# Patient Record
Sex: MALE | Race: WHITE | Employment: OTHER | URBAN - METROPOLITAN AREA
[De-identification: names, ages, dates, MRNs, and addresses within clinical notes are randomized per-mention and may not be internally consistent; named-entity substitution may affect disease eponyms.]

---

## 2017-03-08 LAB — DIABETIC RETINOPATHY: NORMAL

## 2017-12-26 LAB
AVERAGE GLUCOSE: NORMAL
CHOLESTEROL, TOTAL: 220 MG/DL
CHOLESTEROL/HDL RATIO: ABNORMAL
CREATININE, URINE: 227.1
HBA1C MFR BLD: 8.9 %
HDLC SERPL-MCNC: 46 MG/DL (ref 35–70)
LDL CHOLESTEROL CALCULATED: 158 MG/DL (ref 0–160)
MICROALBUMIN/CREAT 24H UR: 19.9 MG/G{CREAT}
MICROALBUMIN/CREAT UR-RTO: 8.8
TRIGL SERPL-MCNC: 82 MG/DL
VLDLC SERPL CALC-MCNC: ABNORMAL MG/DL

## 2018-02-15 LAB
A/G RATIO: NORMAL
ALBUMIN SERPL-MCNC: 4.6 G/DL
ALP BLD-CCNC: 101 U/L
ALT SERPL-CCNC: 16 U/L
AST SERPL-CCNC: 16 U/L
BILIRUB SERPL-MCNC: 0.6 MG/DL (ref 0.1–1.4)
BILIRUBIN DIRECT: 0.49 MG/DL
BILIRUBIN, INDIRECT: NORMAL
BUN BLDV-MCNC: 15 MG/DL
CALCIUM SERPL-MCNC: 10 MG/DL
CHLORIDE BLD-SCNC: 102 MMOL/L
CO2: 28 MMOL/L
CREAT SERPL-MCNC: 1 MG/DL
GFR CALCULATED: NORMAL
GLOBULIN: NORMAL
GLUCOSE BLD-MCNC: 188 MG/DL
POTASSIUM SERPL-SCNC: 4.6 MMOL/L
PROTEIN TOTAL: 7.3 G/DL
SODIUM BLD-SCNC: 137 MMOL/L

## 2018-03-07 DIAGNOSIS — M19.90 ARTHRITIS: ICD-10-CM

## 2018-03-07 DIAGNOSIS — H91.90 HEARING LOSS, UNSPECIFIED HEARING LOSS TYPE, UNSPECIFIED LATERALITY: ICD-10-CM

## 2018-03-07 PROBLEM — R68.84 MAXILLARY PAIN: Status: ACTIVE | Noted: 2018-02-05

## 2018-03-07 PROBLEM — Z12.11 ENCOUNTER FOR SCREENING COLONOSCOPY: Status: ACTIVE | Noted: 2018-01-02

## 2018-03-08 ENCOUNTER — OFFICE VISIT (OUTPATIENT)
Dept: ENDOCRINOLOGY | Age: 68
End: 2018-03-08

## 2018-03-08 ENCOUNTER — TELEPHONE (OUTPATIENT)
Dept: ENDOCRINOLOGY | Age: 68
End: 2018-03-08

## 2018-03-08 VITALS
HEIGHT: 77 IN | HEART RATE: 92 BPM | WEIGHT: 220 LBS | OXYGEN SATURATION: 94 % | DIASTOLIC BLOOD PRESSURE: 76 MMHG | SYSTOLIC BLOOD PRESSURE: 107 MMHG | BODY MASS INDEX: 25.98 KG/M2

## 2018-03-08 DIAGNOSIS — Z12.11 ENCOUNTER FOR SCREENING COLONOSCOPY: ICD-10-CM

## 2018-03-08 DIAGNOSIS — E55.9 VITAMIN D DEFICIENCY: ICD-10-CM

## 2018-03-08 DIAGNOSIS — I82.411 ACUTE DEEP VEIN THROMBOSIS (DVT) OF FEMORAL VEIN OF RIGHT LOWER EXTREMITY (HCC): ICD-10-CM

## 2018-03-08 DIAGNOSIS — E78.2 MIXED HYPERLIPIDEMIA: ICD-10-CM

## 2018-03-08 DIAGNOSIS — E11.9 TYPE 2 DIABETES MELLITUS WITHOUT COMPLICATION, WITHOUT LONG-TERM CURRENT USE OF INSULIN (HCC): Primary | ICD-10-CM

## 2018-03-08 PROCEDURE — 99204 OFFICE O/P NEW MOD 45 MIN: CPT | Performed by: NURSE PRACTITIONER

## 2018-03-08 RX ORDER — RIVAROXABAN 15 MG/1
15 TABLET, FILM COATED ORAL
Refills: 5 | COMMUNITY
Start: 2018-02-12 | End: 2020-01-21

## 2018-03-08 RX ORDER — LANOLIN ALCOHOL/MO/W.PET/CERES
1000 CREAM (GRAM) TOPICAL DAILY
COMMUNITY
End: 2018-07-05 | Stop reason: ALTCHOICE

## 2018-03-08 RX ORDER — OMEPRAZOLE 20 MG/1
20 CAPSULE, DELAYED RELEASE ORAL DAILY
COMMUNITY

## 2018-03-08 RX ORDER — BLOOD-GLUCOSE METER
1 KIT MISCELLANEOUS DAILY PRN
COMMUNITY
End: 2020-08-26 | Stop reason: ALTCHOICE

## 2018-03-08 RX ORDER — SERTRALINE HYDROCHLORIDE 100 MG/1
100 TABLET, FILM COATED ORAL DAILY
COMMUNITY
End: 2020-08-26 | Stop reason: ALTCHOICE

## 2018-03-08 ASSESSMENT — ENCOUNTER SYMPTOMS
EYE PAIN: 0
COLOR CHANGE: 0
CONSTIPATION: 0
DIARRHEA: 0
SHORTNESS OF BREATH: 0
NAUSEA: 0

## 2018-03-08 ASSESSMENT — PATIENT HEALTH QUESTIONNAIRE - PHQ9
SUM OF ALL RESPONSES TO PHQ9 QUESTIONS 1 & 2: 0
2. FEELING DOWN, DEPRESSED OR HOPELESS: 0
1. LITTLE INTEREST OR PLEASURE IN DOING THINGS: 0
SUM OF ALL RESPONSES TO PHQ QUESTIONS 1-9: 0

## 2018-03-08 NOTE — PROGRESS NOTES
Creatinine Ratio    8.8    Microalb, Ur    19.9   Creatinine, Urine    227.10     Diabetic Health Maintenance   · Last Eye Exam: last year, next one is 2 weeks out  · Last Foot exam: never  · Has patient seen a dietitian? Yes  · Current Exercise: No structured exercise  · On ACEI or ARB: no  · On statin: no    Risk Factors  · Smoker: never smoker  · ETOH: none  · Co-morbid conditions: h/o DVT, DDD, trigeminal neuralgia    Hyperlipidemia:  LDL and Chol elevated. Not on a statin  Lab Results   Component Value Date    CHOL 220 12/26/2017     Lab Results   Component Value Date    TRIG 82 12/26/2017     Lab Results   Component Value Date    HDL 46 12/26/2017     Lab Results   Component Value Date    LDLCALC 158 12/26/2017     No results found for: LDLDIRECT  No results found for: CHOLHDLRATIO    Vitamin D deficiency: Currently is on no supplementation. Current complaints include fatigue on daily basis. Last vitamin D level is:  No results found for: VD23T, VITD3, VD25, VITD25    Hypertension  Currently not on any antihypertensive. Controlled , denies symptoms of dizziness, light headedness. Occasional dependent edema. Tries to follow a salt restricted diet.    Lab Results   Component Value Date     02/15/2018    K 4.6 02/15/2018     02/15/2018    CO2 28 02/15/2018    BUN 15 02/15/2018    CREATININE 1.00 02/15/2018    GLUCOSE 188 02/15/2018    CALCIUM 10.0 02/15/2018    PROT 7.3 02/15/2018    LABALBU 4.6 02/15/2018    BILITOT 0.6 02/15/2018    ALKPHOS 101 02/15/2018    AST 16 02/15/2018    ALT 16 02/15/2018     Past Medical History:   Diagnosis Date    Acute deep vein thrombosis (DVT) of femoral vein of right lower extremity (HCC)     Arthritis     Back pain     Chronic kidney disease     Depression (emotion)     Diabetes mellitus (HCC)     GERD (gastroesophageal reflux disease)     Hearing loss     [place] (hard of hearing)     hearing aids at home    Migraine     Trigeminal neuralgia      Family History   Problem Relation Age of Onset    Diabetes Mother     Alzheimer's Disease Mother     Cancer Father     Heart Disease Father      Review of Systems   Constitutional: Negative for activity change, appetite change, diaphoresis, fever and unexpected weight change. HENT: Negative for dental problem. Eyes: Negative for pain and visual disturbance. Respiratory: Negative for shortness of breath. Cardiovascular: Negative for chest pain, palpitations and leg swelling. Gastrointestinal: Negative for constipation, diarrhea and nausea. Endocrine: Negative for cold intolerance, heat intolerance, polydipsia, polyphagia and polyuria. Genitourinary: Negative for frequency and urgency. Musculoskeletal: Negative for arthralgias, joint swelling and myalgias. Skin: Negative for color change and pallor. Neurological: Negative for weakness, numbness and headaches. Psychiatric/Behavioral: Negative for dysphoric mood and sleep disturbance. The patient is not nervous/anxious. Vitals:    03/08/18 1101   BP: 107/76   Site: Left Arm   Position: Sitting   Cuff Size: Large Adult   Pulse: 92   SpO2: 94%   Weight: 220 lb (99.8 kg)   Height: 6' 5\" (1.956 m)     Physical Exam   Constitutional: He is oriented to person, place, and time. He appears well-developed and well-nourished. Eyes: Pupils are equal, round, and reactive to light. Neck: Normal range of motion. No thyromegaly present. Cardiovascular: Normal rate, regular rhythm and normal heart sounds. Pulmonary/Chest: Effort normal and breath sounds normal.   Abdominal: He exhibits no distension. Musculoskeletal: Normal range of motion. He exhibits no edema. Neurological: He is alert and oriented to person, place, and time. No sensory deficit. Skin: Skin is warm and dry. No skin changes or evidence of trauma. Psychiatric: He has a normal mood and affect. His behavior is normal. Thought content normal.   Vitals reviewed.     Skeletal foot if that helps him stay off a statin    XARELTO 15 MG TABS tablet    Vitamin D deficiency    Relevant Orders    VITAMIN D 25 HYDROXY        Return in about 1 month (around 4/8/2018).

## 2018-03-08 NOTE — TELEPHONE ENCOUNTER
Shlomo Castillo from [facility] needs clarity on order for Trulicity it's a pre-filled pen, need order sent over for the correct mg/dosage, her phn is [phone]

## 2018-03-12 DIAGNOSIS — E11.9 TYPE 2 DIABETES MELLITUS WITHOUT COMPLICATION, WITHOUT LONG-TERM CURRENT USE OF INSULIN (HCC): ICD-10-CM

## 2018-03-12 LAB — DIABETIC RETINOPATHY: NORMAL

## 2018-04-02 ENCOUNTER — OFFICE VISIT (OUTPATIENT)
Dept: ENDOCRINOLOGY | Age: 68
End: 2018-04-02

## 2018-04-02 ENCOUNTER — TELEPHONE (OUTPATIENT)
Dept: ENDOCRINOLOGY | Age: 68
End: 2018-04-02

## 2018-04-02 VITALS
SYSTOLIC BLOOD PRESSURE: 109 MMHG | BODY MASS INDEX: 25.27 KG/M2 | HEIGHT: 77 IN | DIASTOLIC BLOOD PRESSURE: 66 MMHG | OXYGEN SATURATION: 98 % | HEART RATE: 95 BPM | WEIGHT: 214 LBS

## 2018-04-02 DIAGNOSIS — E11.00 UNCONTROLLED TYPE 2 DIABETES MELLITUS WITH HYPEROSMOLARITY WITHOUT COMA, WITHOUT LONG-TERM CURRENT USE OF INSULIN (HCC): Primary | ICD-10-CM

## 2018-04-02 LAB — HBA1C MFR BLD: 8.8 %

## 2018-04-02 PROCEDURE — 95250 CONT GLUC MNTR PHYS/QHP EQP: CPT | Performed by: NURSE PRACTITIONER

## 2018-04-02 PROCEDURE — 95251 CONT GLUC MNTR ANALYSIS I&R: CPT | Performed by: NURSE PRACTITIONER

## 2018-04-02 PROCEDURE — 99214 OFFICE O/P EST MOD 30 MIN: CPT | Performed by: NURSE PRACTITIONER

## 2018-04-02 PROCEDURE — 83036 HEMOGLOBIN GLYCOSYLATED A1C: CPT | Performed by: NURSE PRACTITIONER

## 2018-04-02 RX ORDER — REPAGLINIDE 1 MG/1
1 TABLET ORAL 2 TIMES DAILY PRN
Qty: 90 TABLET | Refills: 3 | Status: SHIPPED | OUTPATIENT
Start: 2018-04-02 | End: 2018-10-15

## 2018-04-02 RX ORDER — FLASH GLUCOSE SENSOR
3 KIT MISCELLANEOUS
Qty: 3 EACH | Refills: 2 | Status: SHIPPED | OUTPATIENT
Start: 2018-04-02 | End: 2018-07-05 | Stop reason: ALTCHOICE

## 2018-04-02 RX ORDER — FLASH GLUCOSE SENSOR
1 KIT MISCELLANEOUS PRN
Qty: 1 DEVICE | Refills: 0 | Status: SHIPPED | OUTPATIENT
Start: 2018-04-02 | End: 2018-07-05 | Stop reason: ALTCHOICE

## 2018-04-28 DIAGNOSIS — E11.9 TYPE 2 DIABETES MELLITUS WITHOUT COMPLICATION, WITHOUT LONG-TERM CURRENT USE OF INSULIN (HCC): ICD-10-CM

## 2018-04-30 RX ORDER — BLOOD-GLUCOSE METER
KIT MISCELLANEOUS
Qty: 100 STRIP | Refills: 0 | Status: SHIPPED | OUTPATIENT
Start: 2018-04-30 | End: 2018-07-31 | Stop reason: SDUPTHER

## 2018-04-30 RX ORDER — LANCETS 28 GAUGE
EACH MISCELLANEOUS
Qty: 100 EACH | Refills: 0 | Status: SHIPPED | OUTPATIENT
Start: 2018-04-30 | End: 2018-07-31 | Stop reason: SDUPTHER

## 2018-06-26 DIAGNOSIS — E11.00 UNCONTROLLED TYPE 2 DIABETES MELLITUS WITH HYPEROSMOLARITY WITHOUT COMA, WITHOUT LONG-TERM CURRENT USE OF INSULIN (HCC): ICD-10-CM

## 2018-06-26 RX ORDER — DULAGLUTIDE 1.5 MG/.5ML
INJECTION, SOLUTION SUBCUTANEOUS
Qty: 2 ML | Refills: 0 | Status: SHIPPED | OUTPATIENT
Start: 2018-06-26 | End: 2018-07-24 | Stop reason: SDUPTHER

## 2018-07-05 ENCOUNTER — OFFICE VISIT (OUTPATIENT)
Dept: ENDOCRINOLOGY | Age: 68
End: 2018-07-05

## 2018-07-05 VITALS
HEART RATE: 84 BPM | DIASTOLIC BLOOD PRESSURE: 70 MMHG | BODY MASS INDEX: 25.03 KG/M2 | WEIGHT: 212 LBS | OXYGEN SATURATION: 97 % | HEIGHT: 77 IN | SYSTOLIC BLOOD PRESSURE: 118 MMHG

## 2018-07-05 DIAGNOSIS — E55.9 VITAMIN D DEFICIENCY: ICD-10-CM

## 2018-07-05 DIAGNOSIS — R53.82 CHRONIC FATIGUE: ICD-10-CM

## 2018-07-05 DIAGNOSIS — E11.00 UNCONTROLLED TYPE 2 DIABETES MELLITUS WITH HYPEROSMOLARITY WITHOUT COMA, WITHOUT LONG-TERM CURRENT USE OF INSULIN (HCC): ICD-10-CM

## 2018-07-05 DIAGNOSIS — E11.00 UNCONTROLLED TYPE 2 DIABETES MELLITUS WITH HYPEROSMOLARITY WITHOUT COMA, WITHOUT LONG-TERM CURRENT USE OF INSULIN (HCC): Primary | ICD-10-CM

## 2018-07-05 LAB
A/G RATIO: 2 (ref 1.1–2.2)
ALBUMIN SERPL-MCNC: 4.3 G/DL (ref 3.4–5)
ALP BLD-CCNC: 93 U/L (ref 40–129)
ALT SERPL-CCNC: 20 U/L (ref 10–40)
ANION GAP SERPL CALCULATED.3IONS-SCNC: 10 MMOL/L (ref 3–16)
AST SERPL-CCNC: 17 U/L (ref 15–37)
BILIRUB SERPL-MCNC: 0.4 MG/DL (ref 0–1)
BUN BLDV-MCNC: 16 MG/DL (ref 7–20)
CALCIUM SERPL-MCNC: 9 MG/DL (ref 8.3–10.6)
CHLORIDE BLD-SCNC: 107 MMOL/L (ref 99–110)
CO2: 25 MMOL/L (ref 21–32)
CREAT SERPL-MCNC: 0.9 MG/DL (ref 0.8–1.3)
GFR AFRICAN AMERICAN: >60
GFR NON-AFRICAN AMERICAN: >60
GLOBULIN: 2.2 G/DL
GLUCOSE BLD-MCNC: 189 MG/DL (ref 70–99)
HBA1C MFR BLD: 7.9 %
HCT VFR BLD CALC: 38.4 % (ref 40.5–52.5)
HEMOGLOBIN: 13.3 G/DL (ref 13.5–17.5)
MCH RBC QN AUTO: 31.5 PG (ref 26–34)
MCHC RBC AUTO-ENTMCNC: 34.5 G/DL (ref 31–36)
MCV RBC AUTO: 91.2 FL (ref 80–100)
PDW BLD-RTO: 15.2 % (ref 12.4–15.4)
PLATELET # BLD: 144 K/UL (ref 135–450)
PMV BLD AUTO: 8.5 FL (ref 5–10.5)
POTASSIUM SERPL-SCNC: 5.1 MMOL/L (ref 3.5–5.1)
RBC # BLD: 4.21 M/UL (ref 4.2–5.9)
SODIUM BLD-SCNC: 142 MMOL/L (ref 136–145)
TOTAL PROTEIN: 6.5 G/DL (ref 6.4–8.2)
VITAMIN D 25-HYDROXY: 38.3 NG/ML
WBC # BLD: 3.5 K/UL (ref 4–11)

## 2018-07-05 PROCEDURE — 83036 HEMOGLOBIN GLYCOSYLATED A1C: CPT | Performed by: NURSE PRACTITIONER

## 2018-07-05 PROCEDURE — 99214 OFFICE O/P EST MOD 30 MIN: CPT | Performed by: NURSE PRACTITIONER

## 2018-07-05 RX ORDER — METFORMIN HYDROCHLORIDE 750 MG/1
750 TABLET, EXTENDED RELEASE ORAL
Qty: 30 TABLET | Refills: 3 | Status: SHIPPED | OUTPATIENT
Start: 2018-07-05 | End: 2018-09-27 | Stop reason: SDUPTHER

## 2018-07-05 ASSESSMENT — ENCOUNTER SYMPTOMS
NAUSEA: 0
DIARRHEA: 0
CONSTIPATION: 0
COLOR CHANGE: 0
SHORTNESS OF BREATH: 0
EYE PAIN: 0

## 2018-07-05 ASSESSMENT — PATIENT HEALTH QUESTIONNAIRE - PHQ9
SUM OF ALL RESPONSES TO PHQ QUESTIONS 1-9: 0
2. FEELING DOWN, DEPRESSED OR HOPELESS: 0
SUM OF ALL RESPONSES TO PHQ9 QUESTIONS 1 & 2: 0
1. LITTLE INTEREST OR PLEASURE IN DOING THINGS: 0

## 2018-07-05 NOTE — PROGRESS NOTES
4/2/2018    A1C trends :  Lab Results   Component Value Date    LABA1C 8.8 04/02/2018    LABA1C 8.9 12/26/2017     No results found for: EAG    Current Medication regimen:   Janumet   BID    Other medications tried:  -  Invokana had side effects: \" makes me behave like a zombie\"  -  Glipizide 10 mg caused hypoglycemic episodes even in divided doses, does not want to re-try as he is a . 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    Complications:  · Neuropathy tingling on the right foot: numbness on dorsal aspect of right foot, tingling/pins and needles on right foot and right knee lateral aspect  · Retinopathy: temporarily lost some temporal field of vision on right eye s/p surgery for trigeminal neuralgia,   · Nephropathy: also has a h/o recurrent kidney stones  (right kidney)    Component   Microalbumin Creatinine Ratio    8.8    Microalb, Ur    19.9   Creatinine, Urine    227.10     Diabetic Health Maintenance   · Last Eye Exam: last year, next one is 2 weeks out  · Last Foot exam: never  · Has patient seen a dietitian? Yes  · Current Exercise: No structured exercise  · On ACEI or ARB: no  · On statin: no    Risk Factors  · Smoker: never smoker  · ETOH: none  · Co-morbid conditions: h/o DVT, DDD, trigeminal neuralgia    Hyperlipidemia:  LDL and Chol elevated. Not on a statin  Lab Results   Component Value Date    CHOL 220 12/26/2017     Lab Results   Component Value Date    TRIG 82 12/26/2017     Lab Results   Component Value Date    HDL 46 12/26/2017     Lab Results   Component Value Date    LDLCALC 158 12/26/2017     No results found for: LDLDIRECT  No results found for: CHOLHDLRATIO    Vitamin D deficiency: Currently is on no supplementation. Current complaints include fatigue on daily basis. Last vitamin D level is:  No results found for: VD23T, VITD3, VD25, VITD25    Hypertension  Currently not on any antihypertensive. Controlled , denies symptoms of dizziness, light headedness. Occasional dependent edema. Tries to follow a salt restricted diet. Lab Results   Component Value Date     02/15/2018    K 4.6 02/15/2018     02/15/2018    CO2 28 02/15/2018    BUN 15 02/15/2018    CREATININE 1.00 02/15/2018    GLUCOSE 188 02/15/2018    CALCIUM 10.0 02/15/2018    PROT 7.3 02/15/2018    LABALBU 4.6 02/15/2018    BILITOT 0.6 02/15/2018    ALKPHOS 101 02/15/2018    AST 16 02/15/2018    ALT 16 02/15/2018     Past Medical History:   Diagnosis Date    Acute deep vein thrombosis (DVT) of femoral vein of right lower extremity (HCC)     Arthritis     Back pain     Chronic kidney disease     Depression (emotion)     Diabetes mellitus (HCC)     GERD (gastroesophageal reflux disease)     Hearing loss     [place] (hard of hearing)     hearing aids at home    Migraine     Trigeminal neuralgia      Family History   Problem Relation Age of Onset    Diabetes Mother     Alzheimer's Disease Mother     Cancer Father     Heart Disease Father      Review of Systems   Constitutional: Negative for activity change, appetite change, diaphoresis, fever and unexpected weight change. HENT: Negative for dental problem. Eyes: Negative for pain and visual disturbance. Respiratory: Negative for shortness of breath. Cardiovascular: Negative for chest pain, palpitations and leg swelling. Gastrointestinal: Negative for constipation, diarrhea and nausea. Endocrine: Negative for cold intolerance, heat intolerance, polydipsia, polyphagia and polyuria. Genitourinary: Negative for frequency and urgency. Musculoskeletal: Negative for arthralgias, joint swelling and myalgias. Skin: Negative for color change and pallor. Neurological: Negative for weakness, numbness and headaches. Psychiatric/Behavioral: Negative for dysphoric mood and sleep disturbance. The patient is not nervous/anxious.        Vitals:    07/05/18 1122   Weight: 212 lb (96.2 kg)   Height: 6' 5\" (1.956 m)     Physical Exam Carb/insulin ratio   Fats: Good fats and bad fats, meal planning and supplements.  Physical activity:Discussed how it affects BS readings.  Different diabetic medications   Managing high and low sugar readings      Relevant Medications          metFORMIN (GLUCOPHAGE XR) 750 MG extended release tablet    Other Relevant Orders         Comprehensive Metabolic Panel    RESOLVED: Encounter for screening colonoscopy    Mixed hyperlipidemia    Declined starting a statin at this visit  Wants to wait atleast 3-6 months and improve dietary to see if that helps him stay off a statin    XARELTO 15 MG TABS tablet    Vitamin D deficiency    Relevant Orders    VITAMIN D 25 HYDROXY      Fatigue           Relevant Orders                CBC     Return in about 3 months (around 10/5/2018).

## 2018-07-24 DIAGNOSIS — E11.00 UNCONTROLLED TYPE 2 DIABETES MELLITUS WITH HYPEROSMOLARITY WITHOUT COMA, WITHOUT LONG-TERM CURRENT USE OF INSULIN (HCC): ICD-10-CM

## 2018-07-25 RX ORDER — DULAGLUTIDE 1.5 MG/.5ML
INJECTION, SOLUTION SUBCUTANEOUS
Qty: 2 ML | Refills: 0 | Status: SHIPPED | OUTPATIENT
Start: 2018-07-25 | End: 2018-08-21 | Stop reason: SDUPTHER

## 2018-07-31 DIAGNOSIS — E11.9 TYPE 2 DIABETES MELLITUS WITHOUT COMPLICATION, UNSPECIFIED LONG TERM INSULIN USE STATUS: Primary | ICD-10-CM

## 2018-07-31 DIAGNOSIS — E11.9 TYPE 2 DIABETES MELLITUS WITHOUT COMPLICATION, WITHOUT LONG-TERM CURRENT USE OF INSULIN (HCC): ICD-10-CM

## 2018-07-31 RX ORDER — BLOOD-GLUCOSE METER
KIT MISCELLANEOUS
Qty: 100 STRIP | Refills: 0 | Status: SHIPPED | OUTPATIENT
Start: 2018-07-31 | End: 2018-10-27 | Stop reason: SDUPTHER

## 2018-08-01 RX ORDER — LANCETS 28 GAUGE
EACH MISCELLANEOUS
Qty: 100 EACH | Refills: 0 | Status: SHIPPED | OUTPATIENT
Start: 2018-08-01 | End: 2018-11-07 | Stop reason: SDUPTHER

## 2018-08-21 DIAGNOSIS — E11.00 UNCONTROLLED TYPE 2 DIABETES MELLITUS WITH HYPEROSMOLARITY WITHOUT COMA, WITHOUT LONG-TERM CURRENT USE OF INSULIN (HCC): ICD-10-CM

## 2018-08-22 RX ORDER — DULAGLUTIDE 1.5 MG/.5ML
INJECTION, SOLUTION SUBCUTANEOUS
Qty: 2 ML | Refills: 0 | Status: SHIPPED | OUTPATIENT
Start: 2018-08-22 | End: 2018-09-15 | Stop reason: SDUPTHER

## 2018-09-06 ENCOUNTER — TELEPHONE (OUTPATIENT)
Dept: ENDOCRINOLOGY | Age: 68
End: 2018-09-06

## 2018-09-07 ENCOUNTER — OFFICE VISIT (OUTPATIENT)
Dept: ENDOCRINOLOGY | Age: 68
End: 2018-09-07

## 2018-09-07 VITALS
DIASTOLIC BLOOD PRESSURE: 81 MMHG | BODY MASS INDEX: 24.54 KG/M2 | SYSTOLIC BLOOD PRESSURE: 116 MMHG | HEART RATE: 90 BPM | HEIGHT: 77 IN | OXYGEN SATURATION: 96 % | WEIGHT: 207.8 LBS

## 2018-09-07 DIAGNOSIS — E11.9 CONTROLLED TYPE 2 DIABETES MELLITUS WITHOUT COMPLICATION, WITH LONG-TERM CURRENT USE OF INSULIN (HCC): Primary | ICD-10-CM

## 2018-09-07 DIAGNOSIS — Z79.4 CONTROLLED TYPE 2 DIABETES MELLITUS WITHOUT COMPLICATION, WITH LONG-TERM CURRENT USE OF INSULIN (HCC): Primary | ICD-10-CM

## 2018-09-07 LAB — HBA1C MFR BLD: 8.3 %

## 2018-09-07 PROCEDURE — 83036 HEMOGLOBIN GLYCOSYLATED A1C: CPT | Performed by: NURSE PRACTITIONER

## 2018-09-07 PROCEDURE — 99214 OFFICE O/P EST MOD 30 MIN: CPT | Performed by: NURSE PRACTITIONER

## 2018-09-07 PROCEDURE — G0444 DEPRESSION SCREEN ANNUAL: HCPCS | Performed by: NURSE PRACTITIONER

## 2018-09-07 ASSESSMENT — PATIENT HEALTH QUESTIONNAIRE - PHQ9
3. TROUBLE FALLING OR STAYING ASLEEP: 2
6. FEELING BAD ABOUT YOURSELF - OR THAT YOU ARE A FAILURE OR HAVE LET YOURSELF OR YOUR FAMILY DOWN: 0
SUM OF ALL RESPONSES TO PHQ QUESTIONS 1-9: 9
SUM OF ALL RESPONSES TO PHQ9 QUESTIONS 1 & 2: 4
4. FEELING TIRED OR HAVING LITTLE ENERGY: 2
9. THOUGHTS THAT YOU WOULD BE BETTER OFF DEAD, OR OF HURTING YOURSELF: 0
1. LITTLE INTEREST OR PLEASURE IN DOING THINGS: 1
10. IF YOU CHECKED OFF ANY PROBLEMS, HOW DIFFICULT HAVE THESE PROBLEMS MADE IT FOR YOU TO DO YOUR WORK, TAKE CARE OF THINGS AT HOME, OR GET ALONG WITH OTHER PEOPLE: 2
8. MOVING OR SPEAKING SO SLOWLY THAT OTHER PEOPLE COULD HAVE NOTICED. OR THE OPPOSITE, BEING SO FIGETY OR RESTLESS THAT YOU HAVE BEEN MOVING AROUND A LOT MORE THAN USUAL: 0
7. TROUBLE CONCENTRATING ON THINGS, SUCH AS READING THE NEWSPAPER OR WATCHING TELEVISION: 0
5. POOR APPETITE OR OVEREATING: 1
2. FEELING DOWN, DEPRESSED OR HOPELESS: 3
SUM OF ALL RESPONSES TO PHQ QUESTIONS 1-9: 9

## 2018-09-07 ASSESSMENT — ENCOUNTER SYMPTOMS
COLOR CHANGE: 0
DIARRHEA: 0
NAUSEA: 0
SHORTNESS OF BREATH: 0
EYE PAIN: 0
CONSTIPATION: 0

## 2018-09-07 NOTE — PROGRESS NOTES
Endocrinology  [place], [place]  Phone: [phone]   FAX: [phone]    Parveen Tran is a 80 y.o. male and following up for evaluation of  Diabetes Mellitus type 2 since   Last A1C:   Lab Results   Component Value Date    LABA1C 8.3 2018    LABA1C 7.9 2018    LABA1C 8.8 2018     Last BP Readings:   BP Readings from Last 3 Encounters:   18 116/81   18 118/70   18 109/66     Last LDL:   Lab Results   Component Value Date    LDLCALC 158 2017     Aspirin Use: No    Tobacco History:    Smoking status: Never Smoker    Smokeless tobacco: Never Used     Diabetes:  Parveen Tran  has been diabetic for almost 7 years and managed since then on oral hypoglycemics. He reports that diabetes is generally fairly well controlled. Disease course has been worsening and current complications include peripheral neuropathy and microalbuminuria. He reports that his new job as a  is driving his poor dietary choices as he eats fat foot/high carb snacks etc more frequently  Patient reports good compliance with medication about > 90% of the time, dietary challenges as above, and almost no structured exercise due to back surgery. Recent surgery for trigeminal neuralgia ( second ) about 2 weeks ago. Interim History on 2018  Patient reports BG in high 300s. Back pain and significant stress with wife having  suddenly with seizures,moving his residence and back surgery coming up. Diet is fairly low carb except occasionally. Interim history on 2018  Patient brought glucometer for download: Yes  Readings per day 1.1  Average reading 184  Lowest in past 2 weeks 142  Highest in past 2 weeks 377  Hypoglycemia awareness and symptoms: relative hypoglycemia in 90s    Recent accidents: hit in the face by his horse ( face is bruised), and then fell face down in stairwell, nose required 7 stitches in ED.  Will follow with PCP    On 3/8/2018  Patient brought glucometer for download: not to visit, will bring it on Monday  Readings per day fasting numbers  Average reading high 100s  Lowest in past 2 weeks 70s  Highest in past 2 weeks 200s  Hypoglycemia awareness and symptoms: in the 40s  about 2 months    Sarah Pro CGM done on 4/2/2018    A1C trends :  Lab Results   Component Value Date    LABA1C 8.3 09/07/2018    LABA1C 7.9 07/05/2018    LABA1C 8.8 04/02/2018     No results found for: EAG    Current Medication regimen:   Janumet   BID    Other medications tried:  -  Invokana had side effects: \" makes me behave like a zombie\"  -  Glipizide 10 mg caused hypoglycemic episodes even in divided doses, does not want to re-try as he is a . 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    Complications:  · Neuropathy tingling on the right foot: numbness on dorsal aspect of right foot, tingling/pins and needles on right foot and right knee lateral aspect  · Retinopathy: temporarily lost some temporal field of vision on right eye s/p surgery for trigeminal neuralgia,   · Nephropathy: also has a h/o recurrent kidney stones  (right kidney)    Component   Microalbumin Creatinine Ratio    8.8    Microalb, Ur    19.9   Creatinine, Urine    227.10     Diabetic Health Maintenance   · Last Eye Exam: last year, next one is 2 weeks out  · Last Foot exam: never  · Has patient seen a dietitian? Yes  · Current Exercise: No structured exercise  · On ACEI or ARB: no  · On statin: no    Risk Factors  · Smoker: never smoker  · ETOH: none  · Co-morbid conditions: h/o DVT, DDD, trigeminal neuralgia    Hyperlipidemia:  LDL and Chol elevated.  Not on a statin  Lab Results   Component Value Date    CHOL 220 12/26/2017     Lab Results   Component Value Date    TRIG 82 12/26/2017     Lab Results   Component Value Date    HDL 46 12/26/2017     Lab Results   Component Value Date    LDLCALC 158 12/26/2017     No results found for: LDLDIRECT  No results found for: CHOLHDLRATIO    Vitamin D Musculoskeletal: Negative for arthralgias, joint swelling and myalgias. Skin: Negative for color change and pallor. Neurological: Negative for weakness, numbness and headaches. Psychiatric/Behavioral: Negative for dysphoric mood and sleep disturbance. The patient is not nervous/anxious. Vitals:    09/07/18 1110   BP: 116/81   Site: Left Upper Arm   Position: Sitting   Cuff Size: Large Adult   Pulse: 90   SpO2: 96%   Weight: 207 lb 12.8 oz (94.3 kg)   Height: 6' 5\" (1.956 m)     Physical Exam   Constitutional: He is oriented to person, place, and time. He appears well-developed and well-nourished. Eyes: Pupils are equal, round, and reactive to light. Neck: Normal range of motion. No thyromegaly present. Cardiovascular: Normal rate, regular rhythm and normal heart sounds. Pulmonary/Chest: Effort normal and breath sounds normal.   Abdominal: He exhibits no distension. Musculoskeletal: Normal range of motion. He exhibits no edema. Neurological: He is alert and oriented to person, place, and time. No sensory deficit. Skin: Skin is warm and dry. No skin changes or evidence of trauma. Psychiatric: He has a normal mood and affect. His behavior is normal. Thought content normal.   Vitals reviewed. Skeletal foot exam: Declined at visit    Matt Weaver is a 80years old male with a h/o Diabetes mellitus type 2 complicated with neuropathy and microalbuminuria and  associated with trigeminal neuralgia, h/o DVT, and degenerative disc disease.     Plan  Problem List Items Addressed This Visit     DM (diabetes mellitus), type 2 ([facility] [address] - Primary     A1c = 8.3 (increased)   Goal reduction at next visit < 7     Started Lantus @ 10 units with titration by 1 unit every 3 days until AM fasting is 120-130     Did not administer the last 2 doses of trulicity correctly         Switched from Janumet to Trulicity and Metformin     GI s/e with metformin--> switch to Metformin XR at this visit, he

## 2018-09-15 DIAGNOSIS — E11.00 UNCONTROLLED TYPE 2 DIABETES MELLITUS WITH HYPEROSMOLARITY WITHOUT COMA, WITHOUT LONG-TERM CURRENT USE OF INSULIN (HCC): ICD-10-CM

## 2018-09-23 RX ORDER — DULAGLUTIDE 1.5 MG/.5ML
INJECTION, SOLUTION SUBCUTANEOUS
Qty: 2 ML | Refills: 0 | Status: SHIPPED | OUTPATIENT
Start: 2018-09-23 | End: 2018-10-16 | Stop reason: SDUPTHER

## 2018-09-27 DIAGNOSIS — E11.00 UNCONTROLLED TYPE 2 DIABETES MELLITUS WITH HYPEROSMOLARITY WITHOUT COMA, WITHOUT LONG-TERM CURRENT USE OF INSULIN (HCC): ICD-10-CM

## 2018-09-28 RX ORDER — METFORMIN HYDROCHLORIDE 750 MG/1
TABLET, EXTENDED RELEASE ORAL
Qty: 30 TABLET | Refills: 0 | Status: SHIPPED | OUTPATIENT
Start: 2018-09-28 | End: 2018-10-25 | Stop reason: SDUPTHER

## 2018-10-15 ENCOUNTER — OFFICE VISIT (OUTPATIENT)
Dept: ENDOCRINOLOGY | Age: 68
End: 2018-10-15
Payer: MEDICARE

## 2018-10-15 VITALS
WEIGHT: 209.2 LBS | BODY MASS INDEX: 24.7 KG/M2 | DIASTOLIC BLOOD PRESSURE: 74 MMHG | HEART RATE: 72 BPM | SYSTOLIC BLOOD PRESSURE: 117 MMHG | HEIGHT: 77 IN | OXYGEN SATURATION: 97 %

## 2018-10-15 DIAGNOSIS — E55.9 VITAMIN D DEFICIENCY: ICD-10-CM

## 2018-10-15 DIAGNOSIS — E11.9 CONTROLLED TYPE 2 DIABETES MELLITUS WITHOUT COMPLICATION, WITH LONG-TERM CURRENT USE OF INSULIN (HCC): Primary | ICD-10-CM

## 2018-10-15 DIAGNOSIS — R53.82 CHRONIC FATIGUE: ICD-10-CM

## 2018-10-15 DIAGNOSIS — Z79.4 CONTROLLED TYPE 2 DIABETES MELLITUS WITHOUT COMPLICATION, WITH LONG-TERM CURRENT USE OF INSULIN (HCC): Primary | ICD-10-CM

## 2018-10-15 DIAGNOSIS — E78.2 MIXED HYPERLIPIDEMIA: ICD-10-CM

## 2018-10-15 PROCEDURE — 99214 OFFICE O/P EST MOD 30 MIN: CPT | Performed by: NURSE PRACTITIONER

## 2018-10-15 RX ORDER — GLIMEPIRIDE 4 MG/1
4 TABLET ORAL
Qty: 30 TABLET | Refills: 3 | Status: SHIPPED | OUTPATIENT
Start: 2018-10-15 | End: 2019-05-15 | Stop reason: SDUPTHER

## 2018-10-15 ASSESSMENT — ENCOUNTER SYMPTOMS
COLOR CHANGE: 0
DIARRHEA: 0
NAUSEA: 0
EYE PAIN: 0
SHORTNESS OF BREATH: 0
CONSTIPATION: 0

## 2018-10-15 NOTE — PROGRESS NOTES
CHOL 220 12/26/2017     Lab Results   Component Value Date    TRIG 82 12/26/2017     Lab Results   Component Value Date    HDL 46 12/26/2017     Lab Results   Component Value Date    LDLCALC 158 12/26/2017     No results found for: LDLDIRECT  No results found for: CHOLHDLRATIO    Vitamin D deficiency: Currently is on no supplementation. Current complaints include fatigue on daily basis. Last vitamin D level is:  Lab Results   Component Value Date    VITD25 38.3 07/05/2018     Hypertension  Currently not on any antihypertensive. Controlled , denies symptoms of dizziness, light headedness. Occasional dependent edema. Tries to follow a salt restricted diet. Lab Results   Component Value Date     07/05/2018    K 5.1 07/05/2018     07/05/2018    CO2 25 07/05/2018    BUN 16 07/05/2018    CREATININE 0.9 07/05/2018    GLUCOSE 189 (H) 07/05/2018    CALCIUM 9.0 07/05/2018    PROT 6.5 07/05/2018    LABALBU 4.3 07/05/2018    BILITOT 0.4 07/05/2018    ALKPHOS 93 07/05/2018    AST 17 07/05/2018    ALT 20 07/05/2018    LABGLOM >60 07/05/2018    GFRAA >60 07/05/2018    AGRATIO 2.0 07/05/2018    GLOB 2.2 07/05/2018     Past Medical History:   Diagnosis Date    Acute deep vein thrombosis (DVT) of femoral vein of right lower extremity (HCC)     Arthritis     Back pain     Chronic kidney disease     Depression (emotion)     Diabetes mellitus (HCC)     GERD (gastroesophageal reflux disease)     Hearing loss     [place] (hard of hearing)     hearing aids at home    Migraine     Trigeminal neuralgia      Family History   Problem Relation Age of Onset    Diabetes Mother     Alzheimer's Disease Mother     Cancer Father     Heart Disease Father      Review of Systems   Constitutional: Negative for activity change, appetite change, diaphoresis, fever and unexpected weight change. HENT: Negative for dental problem. Eyes: Negative for pain and visual disturbance. Respiratory: Negative for shortness of breath. Cardiovascular: Negative for chest pain, palpitations and leg swelling. Gastrointestinal: Negative for constipation, diarrhea and nausea. Endocrine: Negative for cold intolerance, heat intolerance, polydipsia, polyphagia and polyuria. Genitourinary: Negative for frequency and urgency. Musculoskeletal: Negative for arthralgias, joint swelling and myalgias. Skin: Negative for color change and pallor. Neurological: Negative for weakness, numbness and headaches. Psychiatric/Behavioral: Negative for dysphoric mood and sleep disturbance. The patient is not nervous/anxious. Vitals:    10/15/18 1257   BP: 117/74   Site: Left Upper Arm   Position: Sitting   Cuff Size: Large Adult   Pulse: 72   SpO2: 97%   Weight: 209 lb 3.2 oz (94.9 kg)   Height: 6' 5\" (1.956 m)     Physical Exam   Constitutional: He is oriented to person, place, and time. He appears well-developed and well-nourished. Eyes: Pupils are equal, round, and reactive to light. Neck: Normal range of motion. No thyromegaly present. Cardiovascular: Normal rate, regular rhythm and normal heart sounds. Pulmonary/Chest: Effort normal and breath sounds normal.   Abdominal: He exhibits no distension. Musculoskeletal: Normal range of motion. He exhibits no edema. Neurological: He is alert and oriented to person, place, and time. No sensory deficit. Skin: Skin is warm and dry. No skin changes or evidence of trauma. Psychiatric: He has a normal mood and affect. His behavior is normal. Thought content normal.   Vitals reviewed. 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     Plan  Problem List Items Addressed This Visit     DM (diabetes mellitus), type 2 ([facility] [address] - Primary     A1c = 8.3 (increased)   Goal reduction at next

## 2018-10-16 DIAGNOSIS — E11.00 UNCONTROLLED TYPE 2 DIABETES MELLITUS WITH HYPEROSMOLARITY WITHOUT COMA, WITHOUT LONG-TERM CURRENT USE OF INSULIN (HCC): ICD-10-CM

## 2018-10-17 RX ORDER — DULAGLUTIDE 1.5 MG/.5ML
INJECTION, SOLUTION SUBCUTANEOUS
Qty: 2 ML | Refills: 0 | Status: SHIPPED | OUTPATIENT
Start: 2018-10-17 | End: 2018-11-07 | Stop reason: SDUPTHER

## 2018-10-25 DIAGNOSIS — E11.00 UNCONTROLLED TYPE 2 DIABETES MELLITUS WITH HYPEROSMOLARITY WITHOUT COMA, WITHOUT LONG-TERM CURRENT USE OF INSULIN (HCC): ICD-10-CM

## 2018-10-25 RX ORDER — METFORMIN HYDROCHLORIDE 750 MG/1
TABLET, EXTENDED RELEASE ORAL
Qty: 30 TABLET | Refills: 0 | Status: SHIPPED | OUTPATIENT
Start: 2018-10-25 | End: 2018-11-29 | Stop reason: SDUPTHER

## 2018-10-27 DIAGNOSIS — E11.9 TYPE 2 DIABETES MELLITUS WITHOUT COMPLICATION, WITHOUT LONG-TERM CURRENT USE OF INSULIN (HCC): ICD-10-CM

## 2018-10-30 RX ORDER — BLOOD-GLUCOSE METER
KIT MISCELLANEOUS
Qty: 100 STRIP | Refills: 0 | Status: SHIPPED | OUTPATIENT
Start: 2018-10-30 | End: 2019-02-18 | Stop reason: SDUPTHER

## 2018-11-07 DIAGNOSIS — E11.00 UNCONTROLLED TYPE 2 DIABETES MELLITUS WITH HYPEROSMOLARITY WITHOUT COMA, WITHOUT LONG-TERM CURRENT USE OF INSULIN (HCC): ICD-10-CM

## 2018-11-07 DIAGNOSIS — E11.9 TYPE 2 DIABETES MELLITUS WITHOUT COMPLICATION (HCC): ICD-10-CM

## 2018-11-07 RX ORDER — DULAGLUTIDE 1.5 MG/.5ML
INJECTION, SOLUTION SUBCUTANEOUS
Qty: 2 ML | Refills: 0 | Status: SHIPPED | OUTPATIENT
Start: 2018-11-07 | End: 2018-12-05 | Stop reason: SDUPTHER

## 2018-11-07 RX ORDER — LANCETS 28 GAUGE
EACH MISCELLANEOUS
Qty: 100 EACH | Refills: 0 | Status: SHIPPED | OUTPATIENT
Start: 2018-11-07

## 2018-11-29 DIAGNOSIS — E11.00 UNCONTROLLED TYPE 2 DIABETES MELLITUS WITH HYPEROSMOLARITY WITHOUT COMA, WITHOUT LONG-TERM CURRENT USE OF INSULIN (HCC): ICD-10-CM

## 2018-11-29 RX ORDER — METFORMIN HYDROCHLORIDE 750 MG/1
TABLET, EXTENDED RELEASE ORAL
Qty: 30 TABLET | Refills: 0 | Status: SHIPPED | OUTPATIENT
Start: 2018-11-29 | End: 2018-12-29 | Stop reason: SDUPTHER

## 2018-12-05 DIAGNOSIS — E11.00 UNCONTROLLED TYPE 2 DIABETES MELLITUS WITH HYPEROSMOLARITY WITHOUT COMA, WITHOUT LONG-TERM CURRENT USE OF INSULIN (HCC): ICD-10-CM

## 2018-12-05 RX ORDER — DULAGLUTIDE 1.5 MG/.5ML
INJECTION, SOLUTION SUBCUTANEOUS
Qty: 2 ML | Refills: 0 | Status: SHIPPED | OUTPATIENT
Start: 2018-12-05 | End: 2019-01-08 | Stop reason: SDUPTHER

## 2018-12-29 DIAGNOSIS — E11.00 UNCONTROLLED TYPE 2 DIABETES MELLITUS WITH HYPEROSMOLARITY WITHOUT COMA, WITHOUT LONG-TERM CURRENT USE OF INSULIN (HCC): ICD-10-CM

## 2018-12-31 RX ORDER — METFORMIN HYDROCHLORIDE 750 MG/1
TABLET, EXTENDED RELEASE ORAL
Qty: 30 TABLET | Refills: 0 | Status: SHIPPED | OUTPATIENT
Start: 2018-12-31 | End: 2019-02-28 | Stop reason: SDUPTHER

## 2019-01-08 DIAGNOSIS — E11.00 UNCONTROLLED TYPE 2 DIABETES MELLITUS WITH HYPEROSMOLARITY WITHOUT COMA, WITHOUT LONG-TERM CURRENT USE OF INSULIN (HCC): ICD-10-CM

## 2019-01-13 RX ORDER — DULAGLUTIDE 1.5 MG/.5ML
INJECTION, SOLUTION SUBCUTANEOUS
Qty: 2 ML | Refills: 0 | Status: SHIPPED | OUTPATIENT
Start: 2019-01-13 | End: 2019-03-06 | Stop reason: SDUPTHER

## 2019-02-18 ENCOUNTER — OFFICE VISIT (OUTPATIENT)
Dept: ENDOCRINOLOGY | Age: 69
End: 2019-02-18
Payer: MEDICARE

## 2019-02-18 VITALS
SYSTOLIC BLOOD PRESSURE: 107 MMHG | WEIGHT: 221.2 LBS | HEIGHT: 77 IN | OXYGEN SATURATION: 97 % | BODY MASS INDEX: 26.12 KG/M2 | HEART RATE: 83 BPM | DIASTOLIC BLOOD PRESSURE: 66 MMHG

## 2019-02-18 DIAGNOSIS — E11.9 TYPE 2 DIABETES MELLITUS WITHOUT COMPLICATION, WITHOUT LONG-TERM CURRENT USE OF INSULIN (HCC): ICD-10-CM

## 2019-02-18 DIAGNOSIS — E11.9 CONTROLLED TYPE 2 DIABETES MELLITUS WITHOUT COMPLICATION, WITH LONG-TERM CURRENT USE OF INSULIN (HCC): Primary | ICD-10-CM

## 2019-02-18 DIAGNOSIS — E55.9 VITAMIN D DEFICIENCY: ICD-10-CM

## 2019-02-18 DIAGNOSIS — Z79.4 CONTROLLED TYPE 2 DIABETES MELLITUS WITHOUT COMPLICATION, WITH LONG-TERM CURRENT USE OF INSULIN (HCC): Primary | ICD-10-CM

## 2019-02-18 LAB — HBA1C MFR BLD: 8.3 %

## 2019-02-18 PROCEDURE — 99214 OFFICE O/P EST MOD 30 MIN: CPT | Performed by: NURSE PRACTITIONER

## 2019-02-18 PROCEDURE — 83036 HEMOGLOBIN GLYCOSYLATED A1C: CPT | Performed by: NURSE PRACTITIONER

## 2019-02-18 RX ORDER — CHLORHEXIDINE GLUCONATE 0.12 MG/ML
RINSE ORAL
Refills: 0 | COMMUNITY
Start: 2019-02-13 | End: 2020-08-26 | Stop reason: ALTCHOICE

## 2019-02-18 RX ORDER — GLIMEPIRIDE 2 MG/1
2 TABLET ORAL EVERY MORNING
Qty: 30 TABLET | Refills: 3 | Status: SHIPPED | OUTPATIENT
Start: 2019-02-18 | End: 2019-08-26 | Stop reason: SINTOL

## 2019-02-18 RX ORDER — GABAPENTIN 300 MG/1
CAPSULE ORAL
Refills: 0 | COMMUNITY
Start: 2018-12-27 | End: 2019-02-18

## 2019-02-18 RX ORDER — RIVAROXABAN 20 MG/1
TABLET, FILM COATED ORAL
Refills: 2 | COMMUNITY
Start: 2018-11-29

## 2019-02-18 ASSESSMENT — ENCOUNTER SYMPTOMS
DIARRHEA: 0
EYE PAIN: 0
NAUSEA: 0
SHORTNESS OF BREATH: 0
CONSTIPATION: 0
COLOR CHANGE: 0

## 2019-02-28 DIAGNOSIS — E11.00 UNCONTROLLED TYPE 2 DIABETES MELLITUS WITH HYPEROSMOLARITY WITHOUT COMA, WITHOUT LONG-TERM CURRENT USE OF INSULIN (HCC): ICD-10-CM

## 2019-02-28 DIAGNOSIS — E11.9 TYPE 2 DIABETES MELLITUS WITHOUT COMPLICATION, WITHOUT LONG-TERM CURRENT USE OF INSULIN (HCC): ICD-10-CM

## 2019-03-01 RX ORDER — BLOOD-GLUCOSE METER
KIT MISCELLANEOUS
Qty: 100 STRIP | Refills: 5 | Status: SHIPPED | OUTPATIENT
Start: 2019-03-01

## 2019-03-01 RX ORDER — METFORMIN HYDROCHLORIDE 750 MG/1
TABLET, EXTENDED RELEASE ORAL
Qty: 30 TABLET | Refills: 3 | Status: SHIPPED | OUTPATIENT
Start: 2019-03-01 | End: 2019-05-31 | Stop reason: SDUPTHER

## 2019-03-05 DIAGNOSIS — E11.00 UNCONTROLLED TYPE 2 DIABETES MELLITUS WITH HYPEROSMOLARITY WITHOUT COMA, WITHOUT LONG-TERM CURRENT USE OF INSULIN (HCC): ICD-10-CM

## 2019-03-15 ENCOUNTER — HOSPITAL ENCOUNTER (OUTPATIENT)
Age: 69
Discharge: HOME | End: 2019-03-15
Payer: MEDICARE

## 2019-03-15 DIAGNOSIS — E11.9 CONTROLLED TYPE 2 DIABETES MELLITUS WITHOUT COMPLICATION, WITH LONG-TERM CURRENT USE OF INSULIN (HCC): ICD-10-CM

## 2019-03-15 DIAGNOSIS — Z79.4 CONTROLLED TYPE 2 DIABETES MELLITUS WITHOUT COMPLICATION, WITH LONG-TERM CURRENT USE OF INSULIN (HCC): ICD-10-CM

## 2019-03-15 DIAGNOSIS — E55.9 VITAMIN D DEFICIENCY: ICD-10-CM

## 2019-03-15 LAB
A/G RATIO: 1.4 (ref 1.1–2.2)
ALBUMIN SERPL-MCNC: 4.3 G/DL (ref 3.4–5)
ALP BLD-CCNC: 94 U/L (ref 40–129)
ALT SERPL-CCNC: 18 U/L (ref 10–40)
ANION GAP SERPL CALCULATED.3IONS-SCNC: 10 MMOL/L (ref 3–16)
AST SERPL-CCNC: 19 U/L (ref 15–37)
BILIRUB SERPL-MCNC: 0.5 MG/DL (ref 0–1)
BUN BLDV-MCNC: 16 MG/DL (ref 7–20)
CALCIUM SERPL-MCNC: 9.6 MG/DL (ref 8.3–10.6)
CHLORIDE BLD-SCNC: 105 MMOL/L (ref 99–110)
CHOLESTEROL, TOTAL: 189 MG/DL (ref 0–199)
CO2: 25 MMOL/L (ref 21–32)
CREAT SERPL-MCNC: 1 MG/DL (ref 0.8–1.3)
CREATININE URINE: 146.1 MG/DL (ref 39–259)
GFR AFRICAN AMERICAN: >60
GFR NON-AFRICAN AMERICAN: >60
GLOBULIN: 3 G/DL
GLUCOSE BLD-MCNC: 180 MG/DL (ref 70–99)
HDLC SERPL-MCNC: 53 MG/DL (ref 40–60)
LDL CHOLESTEROL CALCULATED: 119 MG/DL
MICROALBUMIN UR-MCNC: <1.2 MG/DL
MICROALBUMIN/CREAT UR-RTO: NORMAL MG/G (ref 0–30)
POTASSIUM SERPL-SCNC: 4.6 MMOL/L (ref 3.5–5.1)
SODIUM BLD-SCNC: 140 MMOL/L (ref 136–145)
TOTAL PROTEIN: 7.3 G/DL (ref 6.4–8.2)
TRIGL SERPL-MCNC: 85 MG/DL (ref 0–150)
TSH REFLEX FT4: 1.15 UIU/ML (ref 0.27–4.2)
VITAMIN D 25-HYDROXY: 29.3 NG/ML
VLDLC SERPL CALC-MCNC: 17 MG/DL

## 2019-03-15 PROCEDURE — 36415 COLL VENOUS BLD VENIPUNCTURE: CPT

## 2019-03-15 PROCEDURE — 82570 ASSAY OF URINE CREATININE: CPT

## 2019-03-15 PROCEDURE — 82306 VITAMIN D 25 HYDROXY: CPT

## 2019-03-15 PROCEDURE — 84443 ASSAY THYROID STIM HORMONE: CPT

## 2019-03-15 PROCEDURE — 80061 LIPID PANEL: CPT

## 2019-03-15 PROCEDURE — 80053 COMPREHEN METABOLIC PANEL: CPT

## 2019-03-15 PROCEDURE — 83036 HEMOGLOBIN GLYCOSYLATED A1C: CPT

## 2019-03-15 PROCEDURE — 82043 UR ALBUMIN QUANTITATIVE: CPT

## 2019-03-16 LAB
ESTIMATED AVERAGE GLUCOSE: 188.6 MG/DL
HBA1C MFR BLD: 8.2 %

## 2019-05-13 ENCOUNTER — TELEPHONE (OUTPATIENT)
Dept: ENDOCRINOLOGY | Age: 69
End: 2019-05-13

## 2019-05-13 ENCOUNTER — OFFICE VISIT (OUTPATIENT)
Dept: ENDOCRINOLOGY | Age: 69
End: 2019-05-13
Payer: MEDICARE

## 2019-05-13 VITALS
HEART RATE: 81 BPM | BODY MASS INDEX: 25.2 KG/M2 | WEIGHT: 213.4 LBS | DIASTOLIC BLOOD PRESSURE: 81 MMHG | HEIGHT: 77 IN | OXYGEN SATURATION: 96 % | SYSTOLIC BLOOD PRESSURE: 135 MMHG

## 2019-05-13 DIAGNOSIS — E11.00 UNCONTROLLED TYPE 2 DIABETES MELLITUS WITH HYPEROSMOLARITY WITHOUT COMA, WITHOUT LONG-TERM CURRENT USE OF INSULIN (HCC): ICD-10-CM

## 2019-05-13 DIAGNOSIS — E11.00 UNCONTROLLED TYPE 2 DIABETES MELLITUS WITH HYPEROSMOLARITY WITHOUT COMA, WITHOUT LONG-TERM CURRENT USE OF INSULIN (HCC): Primary | ICD-10-CM

## 2019-05-13 PROCEDURE — 99213 OFFICE O/P EST LOW 20 MIN: CPT | Performed by: NURSE PRACTITIONER

## 2019-05-13 ASSESSMENT — ENCOUNTER SYMPTOMS
COLOR CHANGE: 0
DIARRHEA: 0
SHORTNESS OF BREATH: 0
EYE PAIN: 0
NAUSEA: 0
CONSTIPATION: 0

## 2019-05-13 NOTE — PATIENT INSTRUCTIONS
Restart Lantus at 6 units at night    Calcium: may use 1 regular Tums daily; maintain milk intake    Vitamin D 1000 Units daily
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
## 2019-05-13 NOTE — TELEPHONE ENCOUNTER
Pharmacy called states script sent over for 2 Pre Filled Syringes of Trulicity, but it only comes in a box of 4 that they are not able to open and half out. Please let then know what you want to do.  Thomas 104 [phone]

## 2019-05-15 DIAGNOSIS — E11.9 CONTROLLED TYPE 2 DIABETES MELLITUS WITHOUT COMPLICATION, WITH LONG-TERM CURRENT USE OF INSULIN (HCC): ICD-10-CM

## 2019-05-15 DIAGNOSIS — Z79.4 CONTROLLED TYPE 2 DIABETES MELLITUS WITHOUT COMPLICATION, WITH LONG-TERM CURRENT USE OF INSULIN (HCC): ICD-10-CM

## 2019-05-16 RX ORDER — GLIMEPIRIDE 4 MG/1
TABLET ORAL
Qty: 30 TABLET | Refills: 0 | Status: SHIPPED | OUTPATIENT
Start: 2019-05-16 | End: 2019-08-26

## 2019-05-31 DIAGNOSIS — E11.00 UNCONTROLLED TYPE 2 DIABETES MELLITUS WITH HYPEROSMOLARITY WITHOUT COMA, WITHOUT LONG-TERM CURRENT USE OF INSULIN (HCC): ICD-10-CM

## 2019-05-31 RX ORDER — METFORMIN HYDROCHLORIDE 750 MG/1
TABLET, EXTENDED RELEASE ORAL
Qty: 90 TABLET | Refills: 3 | Status: SHIPPED | OUTPATIENT
Start: 2019-05-31 | End: 2020-04-20

## 2019-06-07 ENCOUNTER — TELEPHONE (OUTPATIENT)
Dept: ENDOCRINOLOGY | Age: 69
End: 2019-06-07

## 2019-06-07 NOTE — TELEPHONE ENCOUNTER
[facility] called and said they need a clarification on the lantus solostar pen.    Patient ref # is [id number]  Please call

## 2019-06-11 NOTE — TELEPHONE ENCOUNTER
Spoke with [facility] and was advised that this has already been shipped and she doesn't know why they called.

## 2019-06-14 ENCOUNTER — HOSPITAL ENCOUNTER (OUTPATIENT)
Age: 69
Discharge: HOME | End: 2019-06-14
Payer: MEDICARE

## 2019-06-14 DIAGNOSIS — E11.00 UNCONTROLLED TYPE 2 DIABETES MELLITUS WITH HYPEROSMOLARITY WITHOUT COMA, WITHOUT LONG-TERM CURRENT USE OF INSULIN (HCC): ICD-10-CM

## 2019-06-14 PROCEDURE — 36415 COLL VENOUS BLD VENIPUNCTURE: CPT

## 2019-06-14 PROCEDURE — 83036 HEMOGLOBIN GLYCOSYLATED A1C: CPT

## 2019-06-15 LAB
ESTIMATED AVERAGE GLUCOSE: 162.8 MG/DL
HBA1C MFR BLD: 7.3 %

## 2019-06-17 ENCOUNTER — TELEPHONE (OUTPATIENT)
Dept: ENDOCRINOLOGY | Age: 69
End: 2019-06-17

## 2019-06-17 NOTE — TELEPHONE ENCOUNTER
Spoke to patient and gave lab results      Reviewed results, Please update patient that A1c has improved to 7. 3!

## 2019-08-26 ENCOUNTER — OFFICE VISIT (OUTPATIENT)
Dept: ENDOCRINOLOGY | Age: 69
End: 2019-08-26
Payer: MEDICARE

## 2019-08-26 VITALS
WEIGHT: 215.4 LBS | SYSTOLIC BLOOD PRESSURE: 126 MMHG | DIASTOLIC BLOOD PRESSURE: 66 MMHG | HEIGHT: 77 IN | OXYGEN SATURATION: 98 % | HEART RATE: 80 BPM | BODY MASS INDEX: 25.43 KG/M2

## 2019-08-26 DIAGNOSIS — E11.00 UNCONTROLLED TYPE 2 DIABETES MELLITUS WITH HYPEROSMOLARITY WITHOUT COMA, WITHOUT LONG-TERM CURRENT USE OF INSULIN (HCC): ICD-10-CM

## 2019-08-26 PROCEDURE — 99213 OFFICE O/P EST LOW 20 MIN: CPT | Performed by: NURSE PRACTITIONER

## 2019-08-26 ASSESSMENT — ENCOUNTER SYMPTOMS
SHORTNESS OF BREATH: 0
COLOR CHANGE: 0
DIARRHEA: 0
CONSTIPATION: 0
EYE PAIN: 0
NAUSEA: 0

## 2019-08-26 NOTE — PROGRESS NOTES
January as was out of town. Also has had recent back surgery and is recovering well from it. Brought glucometer for download: Yes  Readings per day 1  Average reading high 167  Lowest in past 2 weeks 136  Highest in past 2 weeks 215  Hypoglycemia awareness and symptoms: none recent    Interim History on 10/15/2018  Had back surgery on 2018. Brought glucometer for download: Yes  Readings per day 1  Average reading high 150s  Lowest in past 2 weeks 140  Highest in past 2 weeks 233  Hypoglycemia awareness and symptoms: relative hypoglycemia in 90s, but no recent episode     Interim History on 2018  Patient reports BG in high 300s. Back pain and significant stress with wife having  suddenly with seizures,moving his residence and back surgery coming up. Diet is fairly low carb except occasionally. Interim history on 2018  Patient brought glucometer for download: Yes  Readings per day 1.1  Average reading 184  Lowest in past 2 weeks 142  Highest in past 2 weeks 377  Hypoglycemia awareness and symptoms: relative hypoglycemia in 90s    Recent accidents: hit in the face by his horse ( face is bruised), and then fell face down in stairwell, nose required 7 stitches in ED.  Will follow with PCP    On 3/8/2018  Patient brought glucometer for download: not to visit, will bring it on Monday  Readings per day fasting numbers  Average reading high 100s  Lowest in past 2 weeks 70s  Highest in past 2 weeks 200s  Hypoglycemia awareness and symptoms: in the 40s  about 2 months    Sarah Pro CGM done on 2018    A1C trends :  Lab Results   Component Value Date    LABA1C 7.3 2019    LABA1C 8.2 03/15/2019    LABA1C 8.3 2019     Lab Results   Component Value Date    .8 2019    .6 03/15/2019       Current Medication regimen:   Metformin 455 mg QD  Trulicity  1.5 mg  Lantus 10 units QHS    Other medications tried:  -  Invokana had side effects: \" makes me behave like a zombie\"  -

## 2019-09-26 ENCOUNTER — HOSPITAL ENCOUNTER (OUTPATIENT)
Age: 69
Discharge: HOME | End: 2019-09-26
Payer: MEDICARE

## 2019-09-26 DIAGNOSIS — E11.00 UNCONTROLLED TYPE 2 DIABETES MELLITUS WITH HYPEROSMOLARITY WITHOUT COMA, WITHOUT LONG-TERM CURRENT USE OF INSULIN (HCC): ICD-10-CM

## 2019-09-26 PROCEDURE — 36415 COLL VENOUS BLD VENIPUNCTURE: CPT

## 2019-09-26 PROCEDURE — 83036 HEMOGLOBIN GLYCOSYLATED A1C: CPT

## 2019-09-27 LAB
ESTIMATED AVERAGE GLUCOSE: 148.5 MG/DL
HBA1C MFR BLD: 6.8 %

## 2020-01-21 ENCOUNTER — OFFICE VISIT (OUTPATIENT)
Dept: ENDOCRINOLOGY | Age: 70
End: 2020-01-21
Payer: MEDICARE

## 2020-01-21 VITALS
RESPIRATION RATE: 18 BRPM | BODY MASS INDEX: 26.12 KG/M2 | HEIGHT: 77 IN | OXYGEN SATURATION: 95 % | DIASTOLIC BLOOD PRESSURE: 86 MMHG | SYSTOLIC BLOOD PRESSURE: 122 MMHG | WEIGHT: 221.2 LBS | HEART RATE: 83 BPM

## 2020-01-21 DIAGNOSIS — E55.9 VITAMIN D DEFICIENCY: ICD-10-CM

## 2020-01-21 DIAGNOSIS — E11.00 UNCONTROLLED TYPE 2 DIABETES MELLITUS WITH HYPEROSMOLARITY WITHOUT COMA, WITHOUT LONG-TERM CURRENT USE OF INSULIN (HCC): ICD-10-CM

## 2020-01-21 LAB
A/G RATIO: 1.8 (ref 1.1–2.2)
ALBUMIN SERPL-MCNC: 4.5 G/DL (ref 3.4–5)
ALP BLD-CCNC: 100 U/L (ref 40–129)
ALT SERPL-CCNC: 14 U/L (ref 10–40)
ANION GAP SERPL CALCULATED.3IONS-SCNC: 14 MMOL/L (ref 3–16)
AST SERPL-CCNC: 16 U/L (ref 15–37)
BILIRUB SERPL-MCNC: <0.2 MG/DL (ref 0–1)
BUN BLDV-MCNC: 17 MG/DL (ref 7–20)
CALCIUM SERPL-MCNC: 9.3 MG/DL (ref 8.3–10.6)
CHLORIDE BLD-SCNC: 102 MMOL/L (ref 99–110)
CHOLESTEROL, TOTAL: 199 MG/DL (ref 0–199)
CO2: 24 MMOL/L (ref 21–32)
CREAT SERPL-MCNC: 0.8 MG/DL (ref 0.8–1.3)
CREATININE URINE: 153.9 MG/DL (ref 39–259)
GFR AFRICAN AMERICAN: >60
GFR NON-AFRICAN AMERICAN: >60
GLOBULIN: 2.5 G/DL
GLUCOSE BLD-MCNC: 162 MG/DL (ref 70–99)
HBA1C MFR BLD: 8.6 %
HDLC SERPL-MCNC: 55 MG/DL (ref 40–60)
LDL CHOLESTEROL CALCULATED: 123 MG/DL
MICROALBUMIN UR-MCNC: <1.2 MG/DL
MICROALBUMIN/CREAT UR-RTO: NORMAL MG/G (ref 0–30)
POTASSIUM SERPL-SCNC: 4.4 MMOL/L (ref 3.5–5.1)
SODIUM BLD-SCNC: 140 MMOL/L (ref 136–145)
TOTAL PROTEIN: 7 G/DL (ref 6.4–8.2)
TRIGL SERPL-MCNC: 107 MG/DL (ref 0–150)
TSH REFLEX FT4: 1.5 UIU/ML (ref 0.27–4.2)
VITAMIN D 25-HYDROXY: 26.4 NG/ML
VLDLC SERPL CALC-MCNC: 21 MG/DL

## 2020-01-21 PROCEDURE — 83036 HEMOGLOBIN GLYCOSYLATED A1C: CPT | Performed by: NURSE PRACTITIONER

## 2020-01-21 PROCEDURE — 99213 OFFICE O/P EST LOW 20 MIN: CPT | Performed by: NURSE PRACTITIONER

## 2020-01-21 RX ORDER — OXCARBAZEPINE 300 MG/1
TABLET, FILM COATED ORAL
COMMUNITY
Start: 2019-12-30 | End: 2020-04-23 | Stop reason: ALTCHOICE

## 2020-01-21 RX ORDER — BACLOFEN 10 MG/1
TABLET ORAL
COMMUNITY
Start: 2019-12-30 | End: 2020-04-23

## 2020-01-21 ASSESSMENT — ENCOUNTER SYMPTOMS
EYE PAIN: 0
DIARRHEA: 0
SHORTNESS OF BREATH: 0
NAUSEA: 0
CONSTIPATION: 0
COLOR CHANGE: 0
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
## 2020-01-22 LAB
ESTIMATED AVERAGE GLUCOSE: 188.6 MG/DL
HBA1C MFR BLD: 8.2 %

## 2020-01-26 NOTE — ASSESSMENT & PLAN NOTE
A1c today was 8.6  Goal is < 7%    Change in insulin  While taking lantus: 8 units twice a day  May increase night time dose if morning sugar is consistently > 150  Any day, the morning blood sugar less than 90 decrease the night time dose by 1- 2 units    STOP LANTUS if and when starting TOUJEO! Toujeo : start at 12-14 units night time single dose only  Adjust dose to reach  in the morning     No change in other meds.

## 2020-03-25 PROBLEM — E55.9 VITAMIN D DEFICIENCY: Status: RESOLVED | Noted: 2018-03-08 | Resolved: 2020-03-24

## 2020-04-20 RX ORDER — METFORMIN HYDROCHLORIDE 750 MG/1
TABLET, EXTENDED RELEASE ORAL
Qty: 90 TABLET | Refills: 3 | Status: SHIPPED | OUTPATIENT
Start: 2020-04-20 | End: 2020-12-02 | Stop reason: SDUPTHER

## 2020-04-23 ENCOUNTER — OFFICE VISIT (OUTPATIENT)
Dept: ENDOCRINOLOGY | Age: 70
End: 2020-04-23
Payer: MEDICARE

## 2020-04-23 PROCEDURE — 99214 OFFICE O/P EST MOD 30 MIN: CPT | Performed by: NURSE PRACTITIONER

## 2020-04-23 ASSESSMENT — ENCOUNTER SYMPTOMS
CONSTIPATION: 0
DIARRHEA: 0
NAUSEA: 0
COLOR CHANGE: 0
EYE PAIN: 0
SHORTNESS OF BREATH: 0

## 2020-04-24 ENCOUNTER — HOSPITAL ENCOUNTER (OUTPATIENT)
Age: 70
Discharge: HOME | End: 2020-04-24
Payer: MEDICARE

## 2020-04-24 PROCEDURE — 83036 HEMOGLOBIN GLYCOSYLATED A1C: CPT

## 2020-04-24 PROCEDURE — 36415 COLL VENOUS BLD VENIPUNCTURE: CPT

## 2020-04-25 LAB
ESTIMATED AVERAGE GLUCOSE: 154.2 MG/DL
HBA1C MFR BLD: 7 %

## 2020-06-15 ENCOUNTER — TELEPHONE (OUTPATIENT)
Dept: ENDOCRINOLOGY | Age: 70
End: 2020-06-15

## 2020-06-18 NOTE — TELEPHONE ENCOUNTER
Spoke with patient and read Adeola's note to him, patient understands and will call with any other questions.

## 2020-08-26 ENCOUNTER — OFFICE VISIT (OUTPATIENT)
Dept: ENDOCRINOLOGY | Age: 70
End: 2020-08-26
Payer: MEDICARE

## 2020-08-26 VITALS — BODY MASS INDEX: 26.57 KG/M2 | HEIGHT: 77 IN | WEIGHT: 225 LBS

## 2020-08-26 LAB — HBA1C MFR BLD: 6.9 %

## 2020-08-26 PROCEDURE — 99214 OFFICE O/P EST MOD 30 MIN: CPT | Performed by: NURSE PRACTITIONER

## 2020-08-26 PROCEDURE — 83036 HEMOGLOBIN GLYCOSYLATED A1C: CPT | Performed by: NURSE PRACTITIONER

## 2020-08-26 RX ORDER — PEN NEEDLE, DIABETIC 31 GX5/16"
1 NEEDLE, DISPOSABLE MISCELLANEOUS DAILY
Qty: 100 EACH | Refills: 6 | Status: SHIPPED | OUTPATIENT
Start: 2020-08-26

## 2020-08-26 ASSESSMENT — ENCOUNTER SYMPTOMS
NAUSEA: 0
CONSTIPATION: 0
COLOR CHANGE: 0
EYE PAIN: 0
DIARRHEA: 0
SHORTNESS OF BREATH: 0
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
## 2020-08-27 RX ORDER — INSULIN GLARGINE 300 U/ML
INJECTION, SOLUTION SUBCUTANEOUS
Qty: 4.5 ML | Refills: 4 | Status: SHIPPED | OUTPATIENT
Start: 2020-08-27 | End: 2021-11-07

## 2020-12-02 ENCOUNTER — OFFICE VISIT (OUTPATIENT)
Dept: ENDOCRINOLOGY | Age: 70
End: 2020-12-02
Payer: MEDICARE

## 2020-12-02 ENCOUNTER — HOSPITAL ENCOUNTER (OUTPATIENT)
Age: 70
Discharge: HOME | End: 2020-12-02
Payer: MEDICARE

## 2020-12-02 VITALS
WEIGHT: 214 LBS | HEART RATE: 82 BPM | HEIGHT: 77 IN | DIASTOLIC BLOOD PRESSURE: 76 MMHG | SYSTOLIC BLOOD PRESSURE: 112 MMHG | OXYGEN SATURATION: 97 % | BODY MASS INDEX: 25.27 KG/M2 | RESPIRATION RATE: 14 BRPM

## 2020-12-02 LAB
A/G RATIO: 1.7 (ref 1.1–2.2)
ALBUMIN SERPL-MCNC: 4.3 G/DL (ref 3.4–5)
ALP BLD-CCNC: 110 U/L (ref 40–129)
ALT SERPL-CCNC: 18 U/L (ref 10–40)
ANION GAP SERPL CALCULATED.3IONS-SCNC: 9 MMOL/L (ref 3–16)
AST SERPL-CCNC: 20 U/L (ref 15–37)
BILIRUB SERPL-MCNC: 0.4 MG/DL (ref 0–1)
BUN BLDV-MCNC: 14 MG/DL (ref 7–20)
CALCIUM SERPL-MCNC: 9 MG/DL (ref 8.3–10.6)
CHLORIDE BLD-SCNC: 103 MMOL/L (ref 99–110)
CHOLESTEROL, TOTAL: 181 MG/DL (ref 0–199)
CO2: 28 MMOL/L (ref 21–32)
CREAT SERPL-MCNC: 0.8 MG/DL (ref 0.8–1.3)
GFR AFRICAN AMERICAN: >60
GFR NON-AFRICAN AMERICAN: >60
GLOBULIN: 2.6 G/DL
GLUCOSE BLD-MCNC: 61 MG/DL (ref 70–99)
HBA1C MFR BLD: 6.5 %
HCT VFR BLD CALC: 39.3 % (ref 40.5–52.5)
HDLC SERPL-MCNC: 52 MG/DL (ref 40–60)
HEMOGLOBIN: 13.1 G/DL (ref 13.5–17.5)
LDL CHOLESTEROL CALCULATED: 117 MG/DL
MCH RBC QN AUTO: 29.3 PG (ref 26–34)
MCHC RBC AUTO-ENTMCNC: 33.5 G/DL (ref 31–36)
MCV RBC AUTO: 87.6 FL (ref 80–100)
PDW BLD-RTO: 15.1 % (ref 12.4–15.4)
PLATELET # BLD: 175 K/UL (ref 135–450)
PMV BLD AUTO: 8.3 FL (ref 5–10.5)
POTASSIUM SERPL-SCNC: 4.1 MMOL/L (ref 3.5–5.1)
RBC # BLD: 4.48 M/UL (ref 4.2–5.9)
SODIUM BLD-SCNC: 140 MMOL/L (ref 136–145)
TOTAL PROTEIN: 6.9 G/DL (ref 6.4–8.2)
TRIGL SERPL-MCNC: 61 MG/DL (ref 0–150)
TSH REFLEX FT4: 1.26 UIU/ML (ref 0.27–4.2)
VITAMIN D 25-HYDROXY: 45.8 NG/ML
VLDLC SERPL CALC-MCNC: 12 MG/DL
WBC # BLD: 3.5 K/UL (ref 4–11)

## 2020-12-02 PROCEDURE — 83036 HEMOGLOBIN GLYCOSYLATED A1C: CPT | Performed by: NURSE PRACTITIONER

## 2020-12-02 PROCEDURE — 82306 VITAMIN D 25 HYDROXY: CPT

## 2020-12-02 PROCEDURE — 80053 COMPREHEN METABOLIC PANEL: CPT

## 2020-12-02 PROCEDURE — 80061 LIPID PANEL: CPT

## 2020-12-02 PROCEDURE — 36415 COLL VENOUS BLD VENIPUNCTURE: CPT

## 2020-12-02 PROCEDURE — 99214 OFFICE O/P EST MOD 30 MIN: CPT | Performed by: NURSE PRACTITIONER

## 2020-12-02 PROCEDURE — 84443 ASSAY THYROID STIM HORMONE: CPT

## 2020-12-02 PROCEDURE — 85027 COMPLETE CBC AUTOMATED: CPT

## 2020-12-02 RX ORDER — METFORMIN HYDROCHLORIDE 750 MG/1
TABLET, EXTENDED RELEASE ORAL
Qty: 90 TABLET | Refills: 3 | Status: SHIPPED | OUTPATIENT
Start: 2020-12-02 | End: 2020-12-12 | Stop reason: SDUPTHER

## 2020-12-02 ASSESSMENT — ENCOUNTER SYMPTOMS
COLOR CHANGE: 0
NAUSEA: 0
EYE PAIN: 0
DIARRHEA: 0
SHORTNESS OF BREATH: 0
CONSTIPATION: 0
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
## 2020-12-11 ENCOUNTER — TELEPHONE (OUTPATIENT)
Dept: ENDOCRINOLOGY | Age: 70
End: 2020-12-11

## 2020-12-11 NOTE — TELEPHONE ENCOUNTER
Patient called and said please send a 1 month supply of his metformin to the local pharmacy. . [facility] a [facility] [address] [place] [address] [address] [facility]

## 2020-12-12 RX ORDER — METFORMIN HYDROCHLORIDE 750 MG/1
TABLET, EXTENDED RELEASE ORAL
Qty: 30 TABLET | Refills: 0 | Status: SHIPPED | OUTPATIENT
Start: 2020-12-12 | End: 2021-11-07

## 2021-11-04 DIAGNOSIS — E11.00 UNCONTROLLED TYPE 2 DIABETES MELLITUS WITH HYPEROSMOLARITY WITHOUT COMA, WITHOUT LONG-TERM CURRENT USE OF INSULIN (HCC): ICD-10-CM

## 2021-11-05 NOTE — TELEPHONE ENCOUNTER
Medication:   Requested Prescriptions     Pending Prescriptions Disp Refills    metFORMIN (GLUCOPHAGE-XR) 750 MG extended release tablet [Pharmacy Med Name: METFORMIN HCL ER TABS 750MG] 90 tablet 3     Sig: TAKE 1 TABLET EVERY MORNING BEFORE BREAKFAST    [address] [place] 300 UNIT/ML SOPN [Pharmacy Med Name: [address] [place] PEN 1.5ML 3'S 300U/ML] 4.5 mL 4     Sig: INJECT 20 UNITS UNDER THE SKIN NIGHTLY       Last Filled:      Patient Phone Number: [phone] (home)     Last appt: 12/2/2020   Next appt:11/18/2021  Last Labs DM:   Lab Results   Component Value Date    LABA1C 6.5 12/02/2020

## 2021-11-07 RX ORDER — METFORMIN HYDROCHLORIDE 750 MG/1
TABLET, EXTENDED RELEASE ORAL
Qty: 90 TABLET | Refills: 3 | Status: SHIPPED | OUTPATIENT
Start: 2021-11-07

## 2021-11-07 RX ORDER — INSULIN GLARGINE 300 U/ML
INJECTION, SOLUTION SUBCUTANEOUS
Qty: 4.5 ML | Refills: 4 | Status: SHIPPED | OUTPATIENT
Start: 2021-11-07

## 2021-11-16 DIAGNOSIS — E78.2 MIXED HYPERLIPIDEMIA: ICD-10-CM

## 2021-11-16 DIAGNOSIS — E55.9 VITAMIN D DEFICIENCY: ICD-10-CM

## 2021-11-16 DIAGNOSIS — E11.00 UNCONTROLLED TYPE 2 DIABETES MELLITUS WITH HYPEROSMOLARITY WITHOUT COMA, WITHOUT LONG-TERM CURRENT USE OF INSULIN (HCC): Primary | ICD-10-CM

## 2021-11-16 DIAGNOSIS — R53.82 CHRONIC FATIGUE: ICD-10-CM

## 2021-11-29 ENCOUNTER — TELEPHONE (OUTPATIENT)
Dept: ENDOCRINOLOGY | Age: 71
End: 2021-11-29

## 2021-11-29 DIAGNOSIS — E11.00 UNCONTROLLED TYPE 2 DIABETES MELLITUS WITH HYPEROSMOLARITY WITHOUT COMA, WITHOUT LONG-TERM CURRENT USE OF INSULIN (HCC): Primary | ICD-10-CM

## 2021-11-29 NOTE — TELEPHONE ENCOUNTER
PT has moved to [place]. His New Endo Provider requires a Referral to be sent to them, to see him. He requests that it be faxed to office and a copy mailed to his home address.   [facility] MARK Britt  [address] [facility] [address]  Fax to [phone]

## 2021-11-29 NOTE — TELEPHONE ENCOUNTER
Referral in chart  Please fax a copy to number provided below and also mail a copy to his home address, thanks

## 2021-12-08 ENCOUNTER — OFFICE VISIT (OUTPATIENT)
Dept: ENDOCRINOLOGY | Facility: CLINIC | Age: 71
End: 2021-12-08

## 2021-12-08 DIAGNOSIS — E11.65 UNCONTROLLED TYPE 2 DIABETES MELLITUS WITH HYPERGLYCEMIA (HCC): Primary | ICD-10-CM

## 2021-12-08 DIAGNOSIS — E11.65 UNCONTROLLED TYPE 2 DIABETES MELLITUS WITH HYPERGLYCEMIA (HCC): ICD-10-CM

## 2021-12-08 PROCEDURE — G0108 DIAB MANAGE TRN  PER INDIV: HCPCS | Performed by: DIETITIAN, REGISTERED

## 2021-12-08 NOTE — PROGRESS NOTES
Spent 45 mins with pt for individual initial assessment. Pt has recently moved to the area and is needing to establish care with a new endo. Pt has a Accu-Chek meter at home. Pt was able to use demo meter to obtain a FBG of 240. He stated this am he also tested and it was 180. Pt was able to use demo kwikpen and pillow to successfully show how he gives his injections of insulin and Trulicity. Med list scanned into phone note. Pt stated that he is getting a new insurance plan in 2022, and is unsure if he will still be able to continue his current DM regimen depending on his copays on his rx's. Pt also reported that he used to have frequent low BGs he believes d/t going too long without eating, but has not had them occurring as frequently now. Pt has attended [employer] in the past, but thinks he could use a refresher. He is still settling into his new home and will be on vacation starting today, so he plans to call when he is ready to schedule his refresher classes.

## 2021-12-20 RX ORDER — NITROGLYCERIN 0.4 MG/1
0.4 TABLET SUBLINGUAL
COMMUNITY

## 2021-12-20 RX ORDER — LISINOPRIL 5 MG/1
5 TABLET ORAL DAILY
COMMUNITY
End: 2022-05-02 | Stop reason: SDUPTHER

## 2021-12-20 RX ORDER — METFORMIN HYDROCHLORIDE 750 MG/1
1 TABLET, EXTENDED RELEASE ORAL
COMMUNITY
Start: 2021-11-07 | End: 2022-06-08 | Stop reason: SDUPTHER

## 2022-01-03 ENCOUNTER — OFFICE VISIT (OUTPATIENT)
Dept: CARDIOLOGY | Facility: CLINIC | Age: 72
End: 2022-01-03

## 2022-01-03 VITALS
OXYGEN SATURATION: 98 % | DIASTOLIC BLOOD PRESSURE: 61 MMHG | HEART RATE: 71 BPM | BODY MASS INDEX: 24.09 KG/M2 | HEIGHT: 77 IN | WEIGHT: 204 LBS | SYSTOLIC BLOOD PRESSURE: 99 MMHG

## 2022-01-03 DIAGNOSIS — E78.00 PURE HYPERCHOLESTEROLEMIA: ICD-10-CM

## 2022-01-03 DIAGNOSIS — I25.10 CORONARY ARTERY DISEASE INVOLVING NATIVE CORONARY ARTERY OF NATIVE HEART WITHOUT ANGINA PECTORIS: Primary | ICD-10-CM

## 2022-01-03 DIAGNOSIS — E11.9 TYPE 2 DIABETES MELLITUS WITHOUT COMPLICATION, WITHOUT LONG-TERM CURRENT USE OF INSULIN: ICD-10-CM

## 2022-01-03 DIAGNOSIS — I10 PRIMARY HYPERTENSION: ICD-10-CM

## 2022-01-03 PROCEDURE — 93000 ELECTROCARDIOGRAM COMPLETE: CPT | Performed by: INTERNAL MEDICINE

## 2022-01-03 PROCEDURE — 99204 OFFICE O/P NEW MOD 45 MIN: CPT | Performed by: INTERNAL MEDICINE

## 2022-01-03 RX ORDER — PANTOPRAZOLE SODIUM 40 MG/1
40 TABLET, DELAYED RELEASE ORAL DAILY
COMMUNITY
End: 2022-06-08

## 2022-01-03 RX ORDER — ATORVASTATIN CALCIUM 80 MG/1
80 TABLET, FILM COATED ORAL DAILY
COMMUNITY

## 2022-01-03 NOTE — PROGRESS NOTES
"    Subjective:     Encounter Date:01/03/2022      Patient ID: Gurinder Amezcua is a 71 y.o. male.    Chief Complaint:  History of Present Illness 71-year-old white male with history of coronary status post stent placement to the circumflex artery following an MI history of hypertension hyperlipidemia diabetes presents to my office for follow-up.  Patient is currently stable without any symptoms of chest pain or shortness of breath at rest on exertion.  No complains any PND orthopnea.  No palpitation dizziness syncope or swelling of the feet.  Patient has been taking all the medicines regularly.  Patient does not smoke.  Patient is doing exercise regularly follows a good diet.    The following portions of the patient's history were reviewed and updated as appropriate: allergies, current medications, past family history, past medical history, past social history, past surgical history and problem list.  History reviewed. No pertinent past medical history.  Past Surgical History:   Procedure Laterality Date   • CARDIAC CATHETERIZATION  06/17/2021   • CORONARY STENT PLACEMENT  06/17/2021   • CRANIOTOMY     • ENDOSCOPY     • HERNIA REPAIR     • KNEE ACL RECONSTRUCTION     • KNEE ARTHROSCOPY     • LAMINECTOMY       BP 99/61 (BP Location: Left arm, Patient Position: Sitting)   Pulse 71   Ht 195.6 cm (77\")   Wt 92.5 kg (204 lb)   SpO2 98%   BMI 24.19 kg/m²   History reviewed. No pertinent family history.    Current Outpatient Medications:   •  atorvastatin (LIPITOR) 80 MG tablet, Take 80 mg by mouth Daily., Disp: , Rfl:   •  Ergocalciferol (VITAMIN D2 PO), Take  by mouth., Disp: , Rfl:   •  lisinopril (PRINIVIL,ZESTRIL) 5 MG tablet, Take 5 mg by mouth Daily., Disp: , Rfl:   •  metFORMIN ER (GLUCOPHAGE-XR) 750 MG 24 hr tablet, Take 1 tablet by mouth Every Morning Before Breakfast., Disp: , Rfl:   •  metoprolol tartrate (LOPRESSOR) 25 MG tablet, Take 25 mg by mouth., Disp: , Rfl:   •  nitroglycerin (NITROSTAT) 0.4 MG SL " tablet, Place 0.4 mg under the tongue., Disp: , Rfl:   •  pantoprazole (PROTONIX) 40 MG EC tablet, Take 40 mg by mouth Daily., Disp: , Rfl:   •  rivaroxaban (Xarelto) 20 MG tablet, Take 1 tablet by mouth Daily With Dinner., Disp: , Rfl:   •  sertraline (ZOLOFT) 50 MG tablet, Take 50 mg by mouth Daily., Disp: , Rfl:   •  ticagrelor (BRILINTA) 90 MG tablet tablet, Take 90 mg by mouth., Disp: , Rfl:   Allergies   Allergen Reactions   • Adhesive Tape Itching     tegraderm patches causes itching   • Methocarbamol Itching and Rash     Arms etc. No hives.      • Pregabalin Confusion and Other (See Comments)     Hypnotic driving episode  Dizziness and inability to focus  Dizziness and inability to focus  Other reaction(s): Confusion  lyrica     • Silver Rash     Social History     Socioeconomic History   • Marital status:    Tobacco Use   • Smoking status: Never Smoker   • Smokeless tobacco: Never Used     Review of Systems   Constitutional: Negative for fever and malaise/fatigue.   HENT: Negative for ear pain and nosebleeds.    Eyes: Negative for blurred vision and double vision.   Cardiovascular: Negative for chest pain, dyspnea on exertion and palpitations.   Respiratory: Negative for cough and shortness of breath.    Skin: Negative for rash.   Musculoskeletal: Negative for joint pain.   Gastrointestinal: Negative for abdominal pain, nausea and vomiting.   Neurological: Negative for dizziness, focal weakness, headaches and light-headedness.   Psychiatric/Behavioral: Negative for depression. The patient is not nervous/anxious.    All other systems reviewed and are negative.             Objective:     Constitutional:       Appearance: Well-developed.   Eyes:      General: No scleral icterus.     Conjunctiva/sclera: Conjunctivae normal.      Pupils: Pupils are equal, round, and reactive to light.   HENT:      Head: Normocephalic and atraumatic.   Neck:      Vascular: No carotid bruit or JVD.   Pulmonary:      Effort:  Pulmonary effort is normal.      Breath sounds: Normal breath sounds. No wheezing. No rales.   Cardiovascular:      Normal rate. Regular rhythm.   Pulses:     Intact distal pulses.   Abdominal:      General: Bowel sounds are normal.      Palpations: Abdomen is soft.   Musculoskeletal: Normal range of motion.      Cervical back: Normal range of motion and neck supple. Skin:     General: Skin is warm and dry.      Findings: No rash.   Neurological:      Mental Status: Alert.      Comments: No focal deficits         ECG 12 Lead    Date/Time: 1/3/2022 1:16 PM  Performed by: kAin Brown MD  Authorized by: Akin Brown MD   Comments: Sinus rhythm  Normal EKG  No previous EKGs available            Lab Review:         MDM  1.  Coronary disease  Patient had an MI and followed by stent placement in circumflex artery and is currently stable on medical therapy with the Brilinta metoprolol and statins  Patient is on Xarelto for DVT and hence is not on aspirin  2.  History of DVT  Patient is on Xarelto and followed by the primary care doctor  3.  Hypertension  Patient blood pressure currently stable on lisinopril metoprolol  4.  Hyperlipidemia  Patient is currently on statins and the lipid levels are followed by the primary care doctor  5.  Diabetes  Patient is on oral medicines and followed by the primary care doctor.      Patient's previous medical records, labs, and EKG were reviewed and discussed with the patient at today's visit.

## 2022-01-06 ENCOUNTER — PATIENT ROUNDING (BHMG ONLY) (OUTPATIENT)
Dept: CARDIOLOGY | Facility: CLINIC | Age: 72
End: 2022-01-06

## 2022-01-06 NOTE — PROGRESS NOTES
January 6, 2022    Hello, may I speak with Gurinder Amezcua?    My name is BRITTNI.      I am  with [facility] - [place]  [address] [facility] [facility] [place] [address].    Before we get started may I verify your date of birth? 1950    I am calling to officially welcome you to our practice and ask about your recent visit. Is this a good time to talk? No, LEFT MESSAGE ON VOICEMAIL

## 2022-01-09 ENCOUNTER — HOSPITAL ENCOUNTER (EMERGENCY)
Facility: HOSPITAL | Age: 72
Discharge: HOME | End: 2022-01-09
Admitting: EMERGENCY MEDICINE

## 2022-01-09 ENCOUNTER — APPOINTMENT (OUTPATIENT)
Dept: GENERAL RADIOLOGY | Facility: HOSPITAL | Age: 72
End: 2022-01-09

## 2022-01-09 VITALS
BODY MASS INDEX: 24.09 KG/M2 | WEIGHT: 204 LBS | DIASTOLIC BLOOD PRESSURE: 62 MMHG | TEMPERATURE: 97.7 F | RESPIRATION RATE: 16 BRPM | HEIGHT: 77 IN | HEART RATE: 79 BPM | OXYGEN SATURATION: 96 % | SYSTOLIC BLOOD PRESSURE: 101 MMHG

## 2022-01-09 DIAGNOSIS — S91.209A AVULSION OF TOENAIL, INITIAL ENCOUNTER: Primary | ICD-10-CM

## 2022-01-09 DIAGNOSIS — M79.675 PAIN OF TOE OF LEFT FOOT: ICD-10-CM

## 2022-01-09 PROCEDURE — 63710000001 ONDANSETRON ODT 4 MG TABLET DISPERSIBLE: Performed by: NURSE PRACTITIONER

## 2022-01-09 PROCEDURE — 73630 X-RAY EXAM OF FOOT: CPT

## 2022-01-09 PROCEDURE — 99283 EMERGENCY DEPT VISIT LOW MDM: CPT

## 2022-01-09 PROCEDURE — 90471 IMMUNIZATION ADMIN: CPT | Performed by: NURSE PRACTITIONER

## 2022-01-09 PROCEDURE — 25010000002 TETANUS-DIPHTH-ACELL PERTUSSIS 5-2.5-18.5 LF-MCG/0.5 SUSPENSION PREFILLED SYRINGE: Performed by: NURSE PRACTITIONER

## 2022-01-09 PROCEDURE — 90715 TDAP VACCINE 7 YRS/> IM: CPT | Performed by: NURSE PRACTITIONER

## 2022-01-09 RX ORDER — CEPHALEXIN 500 MG/1
500 CAPSULE ORAL 4 TIMES DAILY
Qty: 28 CAPSULE | Refills: 0 | Status: SHIPPED | OUTPATIENT
Start: 2022-01-09 | End: 2022-06-02

## 2022-01-09 RX ORDER — ONDANSETRON 4 MG/1
4 TABLET, ORALLY DISINTEGRATING ORAL ONCE
Status: COMPLETED | OUTPATIENT
Start: 2022-01-09 | End: 2022-01-09

## 2022-01-09 RX ADMIN — TETANUS TOXOID, REDUCED DIPHTHERIA TOXOID AND ACELLULAR PERTUSSIS VACCINE, ADSORBED 0.5 ML: 5; 2.5; 8; 8; 2.5 SUSPENSION INTRAMUSCULAR at 02:12

## 2022-01-09 RX ADMIN — ONDANSETRON 4 MG: 4 TABLET, ORALLY DISINTEGRATING ORAL at 02:11

## 2022-01-09 NOTE — DISCHARGE INSTRUCTIONS
Keep area clean and dry, leave dressing in place for 24 hours, then change daily or when saturated, or soiled  Please follow-up with PCP as scheduled  Please follow-up with dietary, call for an appointment

## 2022-01-09 NOTE — ED PROVIDER NOTES
Subjective    Chief Complaint   Patient presents with   • Wound Check     Marissa Prasad MD  No LMP for male patient.  Allergies   Allergen Reactions   • Adhesive Tape Itching     tegraderm patches causes itching   • Methocarbamol Itching and Rash     Arms etc. No hives.      • Pregabalin Confusion and Other (See Comments)     Hypnotic driving episode  Dizziness and inability to focus  Dizziness and inability to focus  Other reaction(s): Confusion  lyrica     • Silver Rash     History Provided By: Patient    Patient is a 71-year-old male presents emergency department for an injury to his left great toe.  Patient reports he hit his toe on the dog steps in his room, lifting his toenail, causing injury and bleeding.  This occurred just prior to arrival.  Patient is unsure of his last tetanus immunization.  He denies any further injuries.  No numbness tingling.          Review of Systems   Constitutional: Negative for chills and fever.   Skin: Positive for wound.       No past medical history on file.    Allergies   Allergen Reactions   • Adhesive Tape Itching     tegraderm patches causes itching   • Methocarbamol Itching and Rash     Arms etc. No hives.      • Pregabalin Confusion and Other (See Comments)     Hypnotic driving episode  Dizziness and inability to focus  Dizziness and inability to focus  Other reaction(s): Confusion  lyrica     • Silver Rash       Past Surgical History:   Procedure Laterality Date   • CARDIAC CATHETERIZATION  06/17/2021   • CORONARY STENT PLACEMENT  06/17/2021   • CRANIOTOMY     • ENDOSCOPY     • HERNIA REPAIR     • KNEE ACL RECONSTRUCTION     • KNEE ARTHROSCOPY     • LAMINECTOMY         No family history on file.    Social History     Socioeconomic History   • Marital status:    Tobacco Use   • Smoking status: Never Smoker   • Smokeless tobacco: Never Used           Objective   Physical Exam  Vitals and nursing note reviewed.   Constitutional:       General: He is not in acute  distress.     Appearance: Normal appearance. He is not ill-appearing, toxic-appearing or diaphoretic.   HENT:      Head: Normocephalic and atraumatic.   Cardiovascular:      Rate and Rhythm: Regular rhythm. Tachycardia present.      Heart sounds: Normal heart sounds. No murmur heard.  No friction rub. No gallop.    Pulmonary:      Effort: Pulmonary effort is normal.      Breath sounds: Normal breath sounds.   Musculoskeletal:        Feet:       Comments: Bilateral pedal pulses 2+.   Skin:     General: Skin is warm and dry.      Capillary Refill: Capillary refill takes less than 2 seconds.   Neurological:      General: No focal deficit present.      Mental Status: He is alert.   Psychiatric:         Mood and Affect: Mood normal.         Behavior: Behavior normal.         Nail Removal    Date/Time: 1/9/2022 3:22 AM  Performed by: Michelle Mccall APRN  Authorized by: Michelle Mccall APRN     Consent:     Consent obtained:  Verbal    Consent given by:  Patient    Risks discussed:  Bleeding, incomplete removal, infection, pain and permanent nail deformity    Alternatives discussed:  No treatment and observation  Location:     Foot:  L big toe  Pre-procedure details:     Skin preparation:  Betadine    Preparation: Patient was prepped and draped in the usual sterile fashion    Anesthesia (see MAR for exact dosages):     Anesthesia method:  Nerve block    Block needle gauge:  25 G    Block anesthetic:  Lidocaine 2% w/o epi    Block injection procedure:  Anatomic landmarks identified, introduced needle, incremental injection, negative aspiration for blood and anatomic landmarks palpated    Block outcome:  Anesthesia achieved  Nail Removal:     Nail removed:  Complete    Nail bed repaired: no      Removed nail replaced and anchored: no    Trephination:     Subungual hematoma drained: no    Post-procedure details:     Dressing:  Post-op shoe    Patient tolerance of procedure:  Tolerated well, no immediate  "complications  Comments:      Nail unfortunately not salvageable, no injury noted to the nailbed.  No active bleeding noted.  Dressing will be placed and patient be advised to follow-up with PCP and Ortho.               ED Course  ED Course as of 01/09/22 0346   Sun Jan 09, 2022   0301 Digital block [LB]      ED Course User Index  [LB] Michelle Mccall, APRN                /65   Pulse 80   Temp 98.6 °F (37 °C)   Resp 15   Ht 195.6 cm (77\")   Wt 92.5 kg (204 lb)   SpO2 96%   BMI 24.19 kg/m²   Labs Reviewed - No data to display  Medications   Tetanus-Diphth-Acell Pertussis (BOOSTRIX) injection 0.5 mL (0.5 mL Intramuscular Given 1/9/22 0212)   ondansetron ODT (ZOFRAN-ODT) disintegrating tablet 4 mg (4 mg Oral Given 1/9/22 0211)     No radiology results for the last day                                   MDM  Number of Diagnoses or Management Options  Avulsion of toenail, initial encounter  Pain of toe of left foot  Diagnosis management comments: Patient is a 71-year-old male presented to emergency department after injury to his left great toe and injury to the toenail.  No fracture was identified on x-ray.  The patient's toenail had to be removed given avulsion, and damage, and unfortunately was nonsalvageable be replaced.  No nailbed laceration was noted.  Area was cleansed, will be dressed and patient replaced on postop shoe to follow-up with his primary care provider and podiatry.  He will be placed on Keflex.  I spoke with the patient at the bedside regarding their plan of care, discharge instruction, home care, prescriptions, and importance follow-up.  We discussed test results at the bedside, including incidental abnormal labs, radiological findings, understands need for follow-up with primary care or specialist if indicated.      Patient was made aware of indications to return to the emergency department.  Patient agrees with the current plan of care for discharge, verbalized understanding of all " instructions    Pt is aware that discharge does not mean that nothing is wrong but it indicates no emergency is present and they must continue care with follow-up as given below or physician of their choice       Amount and/or Complexity of Data Reviewed  Independent visualization of images, tracings, or specimens: yes    Patient Progress  Patient progress: stable      Final diagnoses:   Avulsion of toenail, initial encounter   Pain of toe of left foot       ED Disposition  ED Disposition     ED Disposition Condition Comment    Discharge Stable           Marissa Prasad MD  [address] [facility] [place] [address] [phone]    Schedule an appointment as soon as possible for a visit       JESSICA Torrez DPM  [address] [place] [address] [phone]      for follow up with toe nail removal         Medication List      New Prescriptions    cephalexin 500 MG capsule  Commonly known as: KEFLEX  Take 1 capsule by mouth 4 (Four) Times a Day.           Where to Get Your Medications      These medications were sent to [id number] - [place], [place] - [address] [place] [address] AT NEC OF  &  - [phone]  - [phone] [place] [address], [place] [address]    Phone: [phone]   · cephalexin 500 MG capsule          Michelle Mccall, APRN  01/09/22 0346

## 2022-05-02 ENCOUNTER — TELEPHONE (OUTPATIENT)
Dept: CARDIOLOGY | Facility: CLINIC | Age: 72
End: 2022-05-02

## 2022-05-02 RX ORDER — LISINOPRIL 5 MG/1
5 TABLET ORAL DAILY
Qty: 90 TABLET | Refills: 2 | Status: SHIPPED | OUTPATIENT
Start: 2022-05-02 | End: 2022-08-22

## 2022-05-02 NOTE — TELEPHONE ENCOUNTER
Rx Refill Note  Requested Prescriptions      No prescriptions requested or ordered in this encounter      Last office visit with prescribing clinician: 1/3/2022      Next office visit with prescribing clinician: 8/1/2022            Sona Cleaning MA  05/02/22, 12:16 EDT

## 2022-05-02 NOTE — TELEPHONE ENCOUNTER
Incoming Refill Request      Medication requested (name and dose): metoprolol tartrate 25 mg, lisinopril 5 mg    Pharmacy where request should be sent: [facility]    Additional details provided by patient:     Best call back number: [phone]    Does the patient have less than a 3 day supply:  [x] Yes  [] No    Juan Calhoun Rep  05/02/22, 12:04 EDT

## 2022-05-19 ENCOUNTER — TELEPHONE (OUTPATIENT)
Dept: CARDIOLOGY | Facility: CLINIC | Age: 72
End: 2022-05-19

## 2022-05-19 NOTE — TELEPHONE ENCOUNTER
Incoming Refill Request      Medication requested (name and dose): BRILINTA 90 MG    Pharmacy where request should be sent: [facility]     Additional details provided by patient: TAKES 2 A DAY. WANTS 90 DAY SUPPLY  Best call back number: [phone]    Does the patient have less than a 3 day supply:  [] Yes  [x] No    Juan Aguilar Rep  05/19/22, 11:28 EDT

## 2022-05-19 NOTE — TELEPHONE ENCOUNTER
Rx Refill Note  Requested Prescriptions     Pending Prescriptions Disp Refills   • ticagrelor (BRILINTA) 90 MG tablet tablet 180 tablet 3     Sig: Take 1 tablet by mouth 2 (Two) Times a Day.      Last office visit with prescribing clinician: 1/3/2022      Next office visit with prescribing clinician: 8/1/2022            Brielle Valverde MA  05/19/22, 11:48 EDT

## 2022-06-02 RX ORDER — POLYETHYLENE GLYCOL-3350 AND ELECTROLYTES 236; 6.74; 5.86; 2.97; 22.74 G/274.31G; G/274.31G; G/274.31G; G/274.31G; G/274.31G
POWDER, FOR SOLUTION ORAL
COMMUNITY
End: 2022-06-08

## 2022-06-02 RX ORDER — OXCARBAZEPINE 150 MG/1
TABLET, FILM COATED ORAL
COMMUNITY
End: 2022-06-08

## 2022-06-02 RX ORDER — CANAGLIFLOZIN 100 MG/1
TABLET, FILM COATED ORAL
COMMUNITY
End: 2022-06-11

## 2022-06-02 RX ORDER — LIDOCAINE 50 MG/G
PATCH TOPICAL
COMMUNITY
End: 2022-06-08

## 2022-06-02 RX ORDER — METFORMIN HYDROCHLORIDE 750 MG/1
TABLET, EXTENDED RELEASE ORAL
COMMUNITY
End: 2022-06-08 | Stop reason: SDUPTHER

## 2022-06-02 RX ORDER — SITAGLIPTIN AND METFORMIN HYDROCHLORIDE 1000; 50 MG/1; MG/1
TABLET, FILM COATED ORAL
COMMUNITY
End: 2022-06-08

## 2022-06-02 RX ORDER — LANCETS 28 GAUGE
EACH MISCELLANEOUS
COMMUNITY

## 2022-06-02 RX ORDER — OXYCODONE HYDROCHLORIDE AND ACETAMINOPHEN 5; 325 MG/1; MG/1
TABLET ORAL EVERY 12 HOURS SCHEDULED
COMMUNITY
End: 2022-08-22

## 2022-06-02 RX ORDER — REPAGLINIDE 1 MG/1
TABLET ORAL EVERY 8 HOURS SCHEDULED
COMMUNITY
End: 2022-06-08

## 2022-06-02 RX ORDER — DULAGLUTIDE 1.5 MG/.5ML
0.5 INJECTION, SOLUTION SUBCUTANEOUS
COMMUNITY
End: 2022-06-11

## 2022-06-02 RX ORDER — GLIMEPIRIDE 4 MG/1
TABLET ORAL
COMMUNITY
End: 2022-06-08

## 2022-06-02 RX ORDER — OMEPRAZOLE 20 MG/1
CAPSULE, DELAYED RELEASE ORAL
COMMUNITY
End: 2022-06-08

## 2022-06-02 RX ORDER — TIZANIDINE 2 MG/1
TABLET ORAL EVERY 12 HOURS SCHEDULED
COMMUNITY
End: 2022-06-08

## 2022-06-02 RX ORDER — DULAGLUTIDE 0.75 MG/.5ML
INJECTION, SOLUTION SUBCUTANEOUS
COMMUNITY
Start: 2022-04-06

## 2022-06-02 RX ORDER — GABAPENTIN 300 MG/1
CAPSULE ORAL EVERY 8 HOURS SCHEDULED
COMMUNITY
End: 2022-06-08

## 2022-06-02 RX ORDER — BLOOD-GLUCOSE METER
KIT MISCELLANEOUS
COMMUNITY
End: 2022-08-01

## 2022-06-02 RX ORDER — SERTRALINE HYDROCHLORIDE 100 MG/1
TABLET, FILM COATED ORAL
COMMUNITY
End: 2022-06-08

## 2022-06-02 RX ORDER — INSULIN GLARGINE 300 U/ML
30 INJECTION, SOLUTION SUBCUTANEOUS DAILY
COMMUNITY
Start: 2022-04-13 | End: 2022-08-01 | Stop reason: SDUPTHER

## 2022-06-08 ENCOUNTER — OFFICE VISIT (OUTPATIENT)
Dept: ENDOCRINOLOGY | Facility: CLINIC | Age: 72
End: 2022-06-08

## 2022-06-08 VITALS
HEART RATE: 79 BPM | HEIGHT: 77 IN | DIASTOLIC BLOOD PRESSURE: 70 MMHG | WEIGHT: 207 LBS | SYSTOLIC BLOOD PRESSURE: 100 MMHG | BODY MASS INDEX: 24.44 KG/M2

## 2022-06-08 DIAGNOSIS — E55.9 VITAMIN D DEFICIENCY: ICD-10-CM

## 2022-06-08 DIAGNOSIS — Z79.4 TYPE 2 DIABETES MELLITUS WITH DIABETIC POLYNEUROPATHY, WITH LONG-TERM CURRENT USE OF INSULIN: Primary | ICD-10-CM

## 2022-06-08 DIAGNOSIS — E78.5 DYSLIPIDEMIA: ICD-10-CM

## 2022-06-08 DIAGNOSIS — E11.42 TYPE 2 DIABETES MELLITUS WITH DIABETIC POLYNEUROPATHY, WITH LONG-TERM CURRENT USE OF INSULIN: Primary | ICD-10-CM

## 2022-06-08 LAB — GLUCOSE BLDC GLUCOMTR-MCNC: 323 MG/DL (ref 70–105)

## 2022-06-08 PROCEDURE — 82962 GLUCOSE BLOOD TEST: CPT | Performed by: INTERNAL MEDICINE

## 2022-06-08 PROCEDURE — 99204 OFFICE O/P NEW MOD 45 MIN: CPT | Performed by: INTERNAL MEDICINE

## 2022-06-08 RX ORDER — METFORMIN HYDROCHLORIDE 750 MG/1
TABLET, EXTENDED RELEASE ORAL
Qty: 180 TABLET | Refills: 3 | Status: SHIPPED | OUTPATIENT
Start: 2022-06-08

## 2022-06-08 RX ORDER — HYDROCODONE BITARTRATE AND ACETAMINOPHEN 5; 325 MG/1; MG/1
TABLET ORAL SEE ADMIN INSTRUCTIONS
COMMUNITY
Start: 2022-05-23 | End: 2022-08-01

## 2022-06-08 RX ORDER — IBUPROFEN 800 MG/1
800 TABLET ORAL EVERY 6 HOURS PRN
COMMUNITY
Start: 2022-05-23 | End: 2022-06-08

## 2022-06-08 NOTE — PATIENT INSTRUCTIONS
See podiatrist.  Do not do your own toe nails. You do not have enough feeling to be safe.  Continue exercise as able.  Increase metformin ER to 2 tabs before breakfast.  Continue other diabetes & chol meds.  Decrease Toujeo to 25 units @  supper.  Call if blood sugars are running under 100 or over 200.  F/u in 6 months, with labs prior.

## 2022-06-22 ENCOUNTER — OFFICE VISIT (OUTPATIENT)
Dept: PODIATRY | Facility: CLINIC | Age: 72
End: 2022-06-22

## 2022-06-22 VITALS
BODY MASS INDEX: 25.33 KG/M2 | SYSTOLIC BLOOD PRESSURE: 105 MMHG | HEIGHT: 76 IN | OXYGEN SATURATION: 97 % | RESPIRATION RATE: 18 BRPM | WEIGHT: 208 LBS | DIASTOLIC BLOOD PRESSURE: 66 MMHG | HEART RATE: 79 BPM

## 2022-06-22 DIAGNOSIS — R09.89 DECREASED PEDAL PULSES: ICD-10-CM

## 2022-06-22 DIAGNOSIS — E11.42 TYPE 2 DIABETES MELLITUS WITH DIABETIC POLYNEUROPATHY, WITH LONG-TERM CURRENT USE OF INSULIN: Primary | ICD-10-CM

## 2022-06-22 DIAGNOSIS — B35.1 ONYCHOMYCOSIS: ICD-10-CM

## 2022-06-22 DIAGNOSIS — Z79.4 TYPE 2 DIABETES MELLITUS WITH DIABETIC POLYNEUROPATHY, WITH LONG-TERM CURRENT USE OF INSULIN: Primary | ICD-10-CM

## 2022-06-22 PROCEDURE — 11721 DEBRIDE NAIL 6 OR MORE: CPT

## 2022-06-22 PROCEDURE — 99202 OFFICE O/P NEW SF 15 MIN: CPT

## 2022-06-22 NOTE — PROGRESS NOTES
"06/22/2022  Foot and Ankle Surgery - Established Patient/Follow-up  Provider: SHAWN Cerrato   Location: [facility]    Subjective:  Gurinder Amezcua is a 72 y.o. male.     Chief Complaint   Patient presents with   • Left Foot - Numbness     Last PCP about 6/1/22   • Right Foot - Numbness       HPI: Patient presents to office today to establish care for routine diabetic foot checks.  Patient was referred by endocrinologist.  He denies any history of ever seeing podiatrist in the past and denies any history of significant wounds or infections to his feet.  Patient does report he has \"several rods in his back\" and has trouble with his vision and is requesting nail debridement today as his nails are too thick and he has trouble getting to nails to cut them safely due to back pain and vision loss.  Patient also reports that when he had his feet checked at endocrinologist he had poor sensation to his feet.  He reports his last A1c was approximately 2 years ago and was 7.2%.  Patient reports his most recent blood sugars are in the low 100s.  He feels his blood sugars are well controlled at this time.  He does report a injury to his left toenail approximately 6 months ago where he had to have nail removed in the emergency department.  Patient reports that healed without complication.    Allergies   Allergen Reactions   • Adhesive Tape Itching     tegraderm patches causes itching   • Methocarbamol Itching and Rash     Arms etc. No hives.      • Pregabalin Confusion and Other (See Comments)     Hypnotic driving episode  Dizziness and inability to focus  Dizziness and inability to focus  Other reaction(s): Confusion  lyrica     • Silver Rash       Current Outpatient Medications on File Prior to Visit   Medication Sig Dispense Refill   • atorvastatin (LIPITOR) 80 MG tablet Take 80 mg by mouth Daily.     • glucose blood (FREESTYLE LITE) test strip FreeStyle Lite Strips     • HYDROcodone-acetaminophen (NORCO) " "5-325 MG per tablet Take  by mouth See Admin Instructions. Take 1 tablet by mouth every 4-6 hours as needed for pain     • Lancets (freestyle) lancets FreeStyle Lancets     • lisinopril (PRINIVIL,ZESTRIL) 5 MG tablet Take 1 tablet by mouth Daily. 90 tablet 2   • metFORMIN ER (GLUCOPHAGE-XR) 750 MG 24 hr tablet Take 2 tabs before breakfast. 180 tablet 3   • metoprolol tartrate (LOPRESSOR) 25 MG tablet Take 1 tablet by mouth 2 (Two) Times a Day. 180 tablet 2   • nitroglycerin (NITROSTAT) 0.4 MG SL tablet Place 0.4 mg under the tongue.     • oxyCODONE-acetaminophen (PERCOCET) 5-325 MG per tablet Every 12 (Twelve) Hours.     • ticagrelor (BRILINTA) 90 MG tablet tablet Take 1 tablet by mouth 2 (Two) Times a Day. 180 tablet 3   • Toujeo SoloStar 300 UNIT/ML solution pen-injector injection Inject 30 Units under the skin into the appropriate area as directed Daily.     • Trulicity 0.75 MG/0.5ML solution pen-injector INJECT 0.75 MG UNDER THE SKIN WEEKLY     • VITAMIN E PO Take  by mouth.       No current facility-administered medications on file prior to visit.       Objective   /66   Pulse 79   Resp 18   Ht 193 cm (76\")   Wt 94.3 kg (208 lb)   SpO2 97%   BMI 25.32 kg/m²     Foot/Ankle Exam:       General:   Diabetic Foot Exam Performed    Appearance: appears stated age and healthy    Orientation: AAOx3    Affect: appropriate    Gait: unimpaired    Assistance: independent    Shoe Gear:  Casual shoes and socks    VASCULAR      Right Foot Vascularity   Dorsalis pedis:  1+  Skin Temperature: cool    Edema Grading:  None  CFT:  3  Varicosities: none       Left Foot Vascularity   Dorsalis pedis:  1+  Skin Temperature: cool    Edema Grading:  None  CFT:  3  Varicosities: none        NEUROLOGIC     Right Foot Neurologic   Light touch sensation:  Diminished  Protective Sensation using [place]-Yuri Monofilament:  1     Left Foot Neurologic   Light touch sensation:  Diminished  Protective Sensation using " [place]-Yuri Monofilament:  0     MUSCULOSKELETAL      Right Foot Musculoskeletal   Ecchymosis:  None  Tenderness: none       Left Foot Musculoskeletal   Ecchymosis:  None  Tenderness: none       MUSCLE STRENGTH     Right Foot Muscle Strength   Foot dorsiflexion:  5  Foot plantar flexion:  5     Left Foot Muscle Strength   Foot dorsiflexion:  5  Foot plantar flexion:  5     DERMATOLOGIC     Right Foot Dermatologic   Skin: skin intact    Nails: onychomycosis, abnormally thick and dystrophic nails       Left Foot Dermatologic   Skin: skin intact    Nails: onychomycosis, abnormally thick and dystrophic nails        Assessment & Plan   Diagnoses and all orders for this visit:    1. Type 2 diabetes mellitus with diabetic polyneuropathy, with long-term current use of insulin (HCC) (Primary)  -     Doppler Arterial Multi Level Lower Extremity - Bilateral CAR; Future    2. Decreased pedal pulses  -     Doppler Arterial Multi Level Lower Extremity - Bilateral CAR; Future      On exam patient's bilateral feet appear stable with no open wounds or signs of active acute infection.  Nails do have slight discoloration, are abnormally thickened slightly dystrophic.  Patient is requesting nail debridement today.  Left great toenail is in state of regrowth but appears stable and healed.  Patient does have decreased pedal pulses with decreased capillary refill.  Would like to obtain MI to rule out any arterial blood flow issues.  Patient has severe loss of protective sensation with monofilament testing.  Do recommend patient obtain diabetic shoes for prevention of callus formation or wound.    Explained importance of diabetic foot care, daily foot checks, and glycemic control. Patient should check both feet on a daily basis, monitor and control blood sugars, make sure that both feet and in between toes are towel dried after baths or showers. Avoid barefoot walking at all times. Check shoes before putting them on.   Patient was  given information on proper foot care. Call the office at the first signs of a wound or with signs of infection.    Nail debridement: Both feet x9    Consent and time out was performed before proceeding with the procedure. Nails were debrided with a nail nipper without complication.  No anesthesia was required.  Indications for procedure were thickened, dystrophic, and fungal appearing nails which are difficult to trim.  Proper self-care and technique was discussed with patient.  Patient was stable after procedure.    Would like for patient to follow-up in 2 months for routine diabetic foot check.  Did discuss with patient that we will call with MI results.  Patient was asked to continue to monitor feet closely and call with any questions or concerns should they arise before scheduled appointment.  Patient verbalized understanding and is agreeable to plan at this time    No orders of the defined types were placed in this encounter.         Note is dictated utilizing voice recognition software. Unfortunately this leads to occasional typographical errors. I apologize in advance if the situation occurs. If questions occur please do not hesitate to call our office.

## 2022-06-25 NOTE — PROGRESS NOTES
[facility]    Referring Provider: Rach Sheppard, LINDA, CDE  Reason for Consultation: Diabetes evaluation & management.    Patient Care Team:  Marissa Prasad MD as PCP - General (Family Medicine)  Akin Brown MD as Consulting Physician (Cardiology)    Chief complaint Diabetes (New Patient, Type 2, , 1.5 hr PP, Toujeo 30 units at night, Ketones negative)      Subjective .     History of present illness:    This is a  72 y.o. male with type 2 Diabetes diagnosed during routine exam in .  Presented with polyuria & polydipsia. Started on metformin. Insulin added in 2019.   Currently on metformin, glimepiride, Trulicity & Toujeo. Cost of meds has been an issue lately.  Attended diabetes education @ [facility]  in 2021.  Uses a FreeStyle meter. Checks blood sugar in am. Lows @ 3 am. Interested in CGMS.  Moved to [place], [place] in 2021 from [place], [place].  Had a heart attack in 2021. Stent placed.  Depressed. Wife  in .  Retired in .  Had 4 back surgeries. Constant back pain.  Does yard work.  Not taking vit D.    Review of Systems  Review of Systems   Constitutional: Negative for unexpected weight gain.   HENT: Negative for trouble swallowing.    Eyes: Negative for blurred vision.   Gastrointestinal: Negative for nausea.   Endocrine: Positive for polydipsia and polyuria.   Genitourinary: Positive for nocturia.   Musculoskeletal: Positive for back pain.   Neurological: Positive for dizziness, numbness and headache.       History  Past Medical History:   Diagnosis Date   • Cataracts, bilateral 2022   • Heart attack (HCC) 2021     Past Surgical History:   Procedure Laterality Date   • BACK SURGERY      4 rods in back   • CARDIAC CATHETERIZATION  2021   • CORONARY STENT PLACEMENT  2021   • CRANIOTOMY     • ENDOSCOPY     • HERNIA REPAIR     • KNEE ACL RECONSTRUCTION     • KNEE ARTHROSCOPY     • LAMINECTOMY     • TRIGEMINAL NERVE  DECOMPRESSION       Family History   Problem Relation Age of Onset   • Diabetes Mother    • Heart attack Father    • Aneurysm Father      Social History     Tobacco Use   • Smoking status: Never Smoker   • Smokeless tobacco: Never Used   Vaping Use   • Vaping Use: Never used   Substance Use Topics   • Alcohol use: Yes     Comment: beer every couple days   • Drug use: Defer       Allergies:  Adhesive tape, Methocarbamol, Pregabalin, and Silver    Objective     Vital Signs       Vitals:    06/08/22 0948   BP: 100/70   Pulse: 79         Physical Exam:     General Appearance:    Alert, cooperative, in no acute distress   Head:    Normocephalic, without obvious abnormality, atraumatic   Eyes:            Lids and lashes normal, conjunctivae and sclerae normal, no   icterus, no pallor, corneas clear, PERRLA   Throat:   No oral lesions,  oral mucosa moist   Neck:   No adenopathy, supple,  no thyromegaly, no   carotid bruit   Lungs:     Clear     Heart:    Regular rhythm and normal rate   Chest Wall:    No abnormalities observed   Abdomen:     Normal bowel sounds, soft                 Extremities:   R great toe nail came off, no edema               Pulses:   Pulses palpable and equal bilaterally   Skin:   Dry   Neurologic:  DTR absent in ankles, absent vibratory sense in toes, unable to feel the 10g monofilament       Results Review  I have reviewed the patient's new clinical results, labs & imaging.     Vit D level 26.4 on 1/21/2020.  A1C 6.5%; vit D level 45.8; cr 0.8, K 4.4, ALT 14; Chol 181, Trigl 61, , HDL 52 on 12/2/2020.    Lab Results (last 24 hours)     ** No results found for the last 24 hours. **          Assessment & Plan    1. Diabetes type 2, with hyperglycemia & peripheral neuropathy  2. Dyslipidemia  3.Vitamin D Deficiency    See podiatrist.  Do not do your own toe nails. You do not have enough feeling to be safe.  Continue exercise as able.  Increase metformin ER to 2 tabs before breakfast.  Continue  other diabetes & chol meds.  Decrease Toujeo to 25 units @  supper.  Call if blood sugars are running under 100 or over 200.  Will order Gris CGMS.It is indicated & medically necessary.    I discussed the patients findings and my recommendations with patient    La Mccormick MD  06/25/22  16:15 EDT

## 2022-06-27 ENCOUNTER — TELEPHONE (OUTPATIENT)
Dept: ENDOCRINOLOGY | Facility: CLINIC | Age: 72
End: 2022-06-27

## 2022-06-27 NOTE — TELEPHONE ENCOUNTER
Pt's afia order will need to be sent to a DME with Medicare plan. Attempted to call pt but had to LVM to call back Awilda. Can initiate an order via [facility] with pt's permission.

## 2022-07-05 ENCOUNTER — HOSPITAL ENCOUNTER (OUTPATIENT)
Dept: CARDIOLOGY | Facility: HOSPITAL | Age: 72
Discharge: HOME | End: 2022-07-05

## 2022-07-05 DIAGNOSIS — E11.42 TYPE 2 DIABETES MELLITUS WITH DIABETIC POLYNEUROPATHY, WITH LONG-TERM CURRENT USE OF INSULIN: ICD-10-CM

## 2022-07-05 DIAGNOSIS — Z79.4 TYPE 2 DIABETES MELLITUS WITH DIABETIC POLYNEUROPATHY, WITH LONG-TERM CURRENT USE OF INSULIN: ICD-10-CM

## 2022-07-05 DIAGNOSIS — R09.89 DECREASED PEDAL PULSES: ICD-10-CM

## 2022-07-05 LAB
BH CV LOWER ARTERIAL LEFT ABI RATIO: 1.28
BH CV LOWER ARTERIAL LEFT DORSALIS PEDIS SYS MAX: 143
BH CV LOWER ARTERIAL LEFT GREAT TOE SYS MAX: 78
BH CV LOWER ARTERIAL LEFT POST TIBIAL SYS MAX: 151
BH CV LOWER ARTERIAL LEFT TBI RATIO: 0.66
BH CV LOWER ARTERIAL RIGHT ABI RATIO: 1.14
BH CV LOWER ARTERIAL RIGHT DORSALIS PEDIS SYS MAX: 128
BH CV LOWER ARTERIAL RIGHT GREAT TOE SYS MAX: 87
BH CV LOWER ARTERIAL RIGHT POST TIBIAL SYS MAX: 135
BH CV LOWER ARTERIAL RIGHT TBI RATIO: 0.74
MAXIMAL PREDICTED HEART RATE: 149 BPM
STRESS TARGET HR: 127 BPM
UPPER ARTERIAL LEFT ARM BRACHIAL SYS MAX: 118 MMHG
UPPER ARTERIAL RIGHT ARM BRACHIAL SYS MAX: 113 MMHG

## 2022-07-05 PROCEDURE — 93922 UPR/L XTREMITY ART 2 LEVELS: CPT

## 2022-07-06 ENCOUNTER — TELEPHONE (OUTPATIENT)
Dept: PODIATRY | Facility: CLINIC | Age: 72
End: 2022-07-06

## 2022-08-01 ENCOUNTER — TELEPHONE (OUTPATIENT)
Dept: ENDOCRINOLOGY | Facility: CLINIC | Age: 72
End: 2022-08-01

## 2022-08-01 ENCOUNTER — OFFICE VISIT (OUTPATIENT)
Dept: CARDIOLOGY | Facility: CLINIC | Age: 72
End: 2022-08-01

## 2022-08-01 VITALS
HEIGHT: 77 IN | BODY MASS INDEX: 24.21 KG/M2 | WEIGHT: 205 LBS | OXYGEN SATURATION: 98 % | HEART RATE: 76 BPM | SYSTOLIC BLOOD PRESSURE: 117 MMHG | DIASTOLIC BLOOD PRESSURE: 72 MMHG

## 2022-08-01 DIAGNOSIS — E11.42 TYPE 2 DIABETES MELLITUS WITH DIABETIC POLYNEUROPATHY, WITH LONG-TERM CURRENT USE OF INSULIN: Primary | ICD-10-CM

## 2022-08-01 DIAGNOSIS — E78.00 PURE HYPERCHOLESTEROLEMIA: ICD-10-CM

## 2022-08-01 DIAGNOSIS — Z79.4 TYPE 2 DIABETES MELLITUS WITH DIABETIC POLYNEUROPATHY, WITH LONG-TERM CURRENT USE OF INSULIN: Primary | ICD-10-CM

## 2022-08-01 DIAGNOSIS — I25.10 CORONARY ARTERY DISEASE INVOLVING NATIVE CORONARY ARTERY OF NATIVE HEART WITHOUT ANGINA PECTORIS: ICD-10-CM

## 2022-08-01 DIAGNOSIS — I10 PRIMARY HYPERTENSION: ICD-10-CM

## 2022-08-01 PROCEDURE — 99214 OFFICE O/P EST MOD 30 MIN: CPT | Performed by: INTERNAL MEDICINE

## 2022-08-01 RX ORDER — INSULIN GLARGINE 300 U/ML
30 INJECTION, SOLUTION SUBCUTANEOUS DAILY
Start: 2022-08-01

## 2022-08-01 NOTE — PROGRESS NOTES
"    Subjective:     Encounter Date:08/01/2022      Patient ID: Gurinder Amezcua is a 72 y.o. male.    Chief Complaint:  History of Present Illness 72-year-old white male with history of coronary status post and placement of the LAD history of hypertension hyperlipidemia and diabetes presents to my office for follow-up.  Patient is currently stable without any symptoms of chest pain or shortness of breath at rest or exertion.  No complains any PND orthopnea.  No palpitation dizziness syncope or swelling of the feet.  Patient has been taking all the medicines regularly.  Patient does not smoke.    The following portions of the patient's history were reviewed and updated as appropriate: allergies, current medications, past family history, past medical history, past social history, past surgical history and problem list.  Past Medical History:   Diagnosis Date   • Cataracts, bilateral 04/2022   • Heart attack (HCC) 06/17/2021     Past Surgical History:   Procedure Laterality Date   • BACK SURGERY  2019    4 rods in back   • CARDIAC CATHETERIZATION  06/17/2021   • CORONARY STENT PLACEMENT  06/17/2021   • CRANIOTOMY     • ENDOSCOPY     • HERNIA REPAIR     • KNEE ACL RECONSTRUCTION     • KNEE ARTHROSCOPY     • LAMINECTOMY     • TRIGEMINAL NERVE DECOMPRESSION       /72 (BP Location: Left arm, Patient Position: Sitting)   Pulse 76   Ht 195.6 cm (77\")   Wt 93 kg (205 lb)   SpO2 98%   BMI 24.31 kg/m²   Family History   Problem Relation Age of Onset   • Diabetes Mother    • Heart attack Father    • Aneurysm Father        Current Outpatient Medications:   •  atorvastatin (LIPITOR) 80 MG tablet, Take 80 mg by mouth Daily., Disp: , Rfl:   •  Lancets (freestyle) lancets, FreeStyle Lancets, Disp: , Rfl:   •  lisinopril (PRINIVIL,ZESTRIL) 5 MG tablet, Take 1 tablet by mouth Daily., Disp: 90 tablet, Rfl: 2  •  metFORMIN ER (GLUCOPHAGE-XR) 750 MG 24 hr tablet, Take 2 tabs before breakfast., Disp: 180 tablet, Rfl: 3  •  " metoprolol tartrate (LOPRESSOR) 25 MG tablet, Take 1 tablet by mouth 2 (Two) Times a Day., Disp: 180 tablet, Rfl: 2  •  nitroglycerin (NITROSTAT) 0.4 MG SL tablet, Place 0.4 mg under the tongue., Disp: , Rfl:   •  oxyCODONE-acetaminophen (PERCOCET) 5-325 MG per tablet, Every 12 (Twelve) Hours., Disp: , Rfl:   •  ticagrelor (BRILINTA) 90 MG tablet tablet, Take 1 tablet by mouth 2 (Two) Times a Day., Disp: 180 tablet, Rfl: 3  •  Toujeo SoloStar 300 UNIT/ML solution pen-injector injection, Inject 30 Units under the skin into the appropriate area as directed Daily., Disp: , Rfl:   •  Trulicity 0.75 MG/0.5ML solution pen-injector, INJECT 0.75 MG UNDER THE SKIN WEEKLY, Disp: , Rfl:   Allergies   Allergen Reactions   • Adhesive Tape Itching     tegraderm patches causes itching   • Methocarbamol Itching and Rash     Arms etc. No hives.      • Pregabalin Confusion and Other (See Comments)     Hypnotic driving episode  Dizziness and inability to focus  Dizziness and inability to focus  Other reaction(s): Confusion  lyrica     • Silver Rash     Social History     Socioeconomic History   • Marital status:    Tobacco Use   • Smoking status: Never Smoker   • Smokeless tobacco: Never Used   Vaping Use   • Vaping Use: Never used   Substance and Sexual Activity   • Alcohol use: Yes     Comment: beer every couple days   • Drug use: Defer   • Sexual activity: Defer     Review of Systems   Constitutional: Negative for fever and malaise/fatigue.   Cardiovascular: Negative for chest pain, dyspnea on exertion and palpitations.   Respiratory: Negative for cough and shortness of breath.    Skin: Negative for rash.   Gastrointestinal: Negative for abdominal pain, nausea and vomiting.   Neurological: Negative for focal weakness and headaches.   All other systems reviewed and are negative.             Objective:     Constitutional:       Appearance: Well-developed.   Eyes:      General: No scleral icterus.     Conjunctiva/sclera:  Conjunctivae normal.   HENT:      Head: Normocephalic and atraumatic.   Neck:      Vascular: No carotid bruit or JVD.   Pulmonary:      Effort: Pulmonary effort is normal.      Breath sounds: Normal breath sounds. No wheezing. No rales.   Cardiovascular:      Normal rate. Regular rhythm.   Pulses:     Intact distal pulses.   Abdominal:      General: Bowel sounds are normal.      Palpations: Abdomen is soft.   Musculoskeletal:      Cervical back: Normal range of motion and neck supple. Skin:     General: Skin is warm and dry.      Findings: No rash.   Neurological:      Mental Status: Alert.       Procedures    Lab Review:         MDM #1 coronary disease  Patient had stent placement to the LAD in the past and is currently stable on medications  2.  Hypertension  Patient's blood pressure and heart rate are stable  3.  Hyperlipidemia  Pains on statins and the lipid levels are followed by the endocrinologist  4.  Diabetes  Patient is on oral medicines as well as insulin.    Patient's previous medical records, labs, and EKG were reviewed and discussed with the patient at today's visit.

## 2022-08-18 ENCOUNTER — OFFICE VISIT (OUTPATIENT)
Dept: ENDOCRINOLOGY | Facility: CLINIC | Age: 72
End: 2022-08-18

## 2022-08-18 DIAGNOSIS — E11.65 TYPE 2 DIABETES MELLITUS WITH HYPERGLYCEMIA, UNSPECIFIED WHETHER LONG TERM INSULIN USE: Primary | ICD-10-CM

## 2022-08-18 DIAGNOSIS — Z79.4 TYPE 2 DIABETES MELLITUS WITH DIABETIC POLYNEUROPATHY, WITH LONG-TERM CURRENT USE OF INSULIN: ICD-10-CM

## 2022-08-18 DIAGNOSIS — E11.42 TYPE 2 DIABETES MELLITUS WITH DIABETIC POLYNEUROPATHY, WITH LONG-TERM CURRENT USE OF INSULIN: ICD-10-CM

## 2022-08-18 PROCEDURE — G0108 DIAB MANAGE TRN  PER INDIV: HCPCS | Performed by: DIETITIAN, REGISTERED

## 2022-08-18 NOTE — PROGRESS NOTES
Spent 30 mins with pt. Pt has decided that he would like to hold off on being trained how to use the Gris CGM. He will continue to monitor his BG with his meter. He also stated during appt that his Trulcitiy and Toujeo have become too expensive. Educator provided names of other GLP-1 RA's and long acting insulins, and pt is to check with his ins to see if any of them has better coverage.

## 2022-08-22 ENCOUNTER — OFFICE VISIT (OUTPATIENT)
Dept: PODIATRY | Facility: CLINIC | Age: 72
End: 2022-08-22

## 2022-08-22 VITALS — BODY MASS INDEX: 24.21 KG/M2 | WEIGHT: 205 LBS | HEIGHT: 77 IN

## 2022-08-22 DIAGNOSIS — E11.42 TYPE 2 DIABETES MELLITUS WITH DIABETIC POLYNEUROPATHY, WITH LONG-TERM CURRENT USE OF INSULIN: Primary | ICD-10-CM

## 2022-08-22 DIAGNOSIS — Z79.4 TYPE 2 DIABETES MELLITUS WITH DIABETIC POLYNEUROPATHY, WITH LONG-TERM CURRENT USE OF INSULIN: Primary | ICD-10-CM

## 2022-08-22 DIAGNOSIS — B35.1 ONYCHOMYCOSIS: ICD-10-CM

## 2022-08-22 DIAGNOSIS — R23.8 PAPULE OF SKIN: ICD-10-CM

## 2022-08-22 PROCEDURE — 11721 DEBRIDE NAIL 6 OR MORE: CPT

## 2022-08-22 PROCEDURE — 99213 OFFICE O/P EST LOW 20 MIN: CPT

## 2022-08-23 NOTE — PROGRESS NOTES
08/22/2022  Foot and Ankle Surgery - Established Patient/Follow-up  Provider: SHAWN Cerrato   Location: [facility]    Subjective:  Gurinder Amezcua is a 72 y.o. male.     Chief Complaint   Patient presents with   • Right Foot - Follow-up, Peripheral Neuropathy     Dm foot care   • Left Foot - Follow-up, Peripheral Neuropathy     Dm foot care   • Follow-up     KIRK Prasad md  6/1/2022       HPI: Patient presents to office today for routine diabetic foot check.  He is requesting nail debridement today and reports nails are too thick and he is unable to get to them to cut them safely.  Patient does report bug bites to right foot that he noticed about 4 days ago.  He reports itching to the bug bites and feels that one of them has started to drain.  Patient reports that insurance would not cover diabetic shoes and he feels that he would rather wear current tennis shoes with over-the-counter insoles.  Patient did have MI completed and results were within normal limits.  Patient reports blood sugars have been well controlled.    Allergies   Allergen Reactions   • Adhesive Tape Itching     tegraderm patches causes itching   • Methocarbamol Itching and Rash     Arms etc. No hives.      • Pregabalin Confusion and Other (See Comments)     Hypnotic driving episode  Dizziness and inability to focus  Dizziness and inability to focus  Other reaction(s): Confusion  lyrica     • Silver Rash       Current Outpatient Medications on File Prior to Visit   Medication Sig Dispense Refill   • atorvastatin (LIPITOR) 80 MG tablet Take 80 mg by mouth Daily.     • Lancets (freestyle) lancets FreeStyle Lancets     • metFORMIN ER (GLUCOPHAGE-XR) 750 MG 24 hr tablet Take 2 tabs before breakfast. 180 tablet 3   • nitroglycerin (NITROSTAT) 0.4 MG SL tablet Place 0.4 mg under the tongue.     • Toujeo SoloStar 300 UNIT/ML solution pen-injector injection Inject 30 Units under the skin into the appropriate area as directed Daily.  "Inject 25 units QHS     • Trulicity 0.75 MG/0.5ML solution pen-injector INJECT 0.75 MG UNDER THE SKIN WEEKLY       No current facility-administered medications on file prior to visit.       Objective   Ht 195.6 cm (77\")   Wt 93 kg (205 lb)   BMI 24.31 kg/m²     Foot/Ankle Exam     General:   Diabetic Foot Exam Performed    Appearance: appears stated age and healthy    Orientation: AAOx3    Affect: appropriate    Gait: unimpaired    Assistance: independent    Shoe Gear:  Casual shoes and socks     VASCULAR       Right Foot Vascularity   Dorsalis pedis:  1+  Skin Temperature: cool    Edema Grading:  None  CFT:  3  Varicosities: none        Left Foot Vascularity   Dorsalis pedis:  1+  Skin Temperature: cool    Edema Grading:  None  CFT:  3  Varicosities: none        NEUROLOGIC      Right Foot Neurologic   Light touch sensation:  Diminished  Protective Sensation using Cushman-Yuri Monofilament:  1      Left Foot Neurologic   Light touch sensation:  Diminished  Protective Sensation using Cushman-Yuri Monofilament:  0      MUSCULOSKELETAL       Right Foot Musculoskeletal   Ecchymosis:  None  Tenderness: none        Left Foot Musculoskeletal   Ecchymosis:  None  Tenderness: none        MUSCLE STRENGTH      Right Foot Muscle Strength   Foot dorsiflexion:  5  Foot plantar flexion:  5      Left Foot Muscle Strength   Foot dorsiflexion:  5  Foot plantar flexion:  5      DERMATOLOGIC      Right Foot Dermatologic   Skin: skin intact    Nails: onychomycosis, abnormally thick and dystrophic nails        Left Foot Dermatologic   Skin: skin intact    Nails: onychomycosis, abnormally thick and dystrophic nails            Assessment & Plan   Diagnoses and all orders for this visit:    1. Type 2 diabetes mellitus with diabetic polyneuropathy, with long-term current use of insulin (HCC) (Primary)    2. Onychomycosis    3. Papule of skin  Comments:  foot    Other orders  -     mupirocin (BACTROBAN) 2 % ointment; Apply once " daily.  Dispense: 30 g; Refill: 0      On exam patient does have small oozing papule to right dorsal foot consistent with an insect bite.  There is some mild erythema and serous drainage from the lesion.  Will recommend patient start mupirocin antibiotic ointment to the area and cover with Band-Aid 1-2 times daily.  Otherwise bilateral feet are stable with no open wounds or signs of infection.  Do feel patient is at moderate risk for pedal complications due to diabetes.Explained importance of diabetic foot care, daily foot checks, and glycemic control. Patient should check both feet on a daily basis, monitor and control blood sugars, make sure that both feet and in between toes are towel dried after baths or showers. Avoid barefoot walking at all times. Check shoes before putting them on.   Patient was given information on proper foot care. Call the office at the first signs of a wound or with signs of infection.  We did review results of MI were found to be within normal limits.    Nail debridement: Both feet x10    Consent and time out was performed before proceeding with the procedure. Nails were debrided with a nail nipper without complication.  No anesthesia was required.  Indications for procedure were thickened, dystrophic, and fungal appearing nails which are difficult to trim.  Proper self-care and technique was discussed with patient.  Patient was stable after procedure.    Would like for patient to continue to monitor feet closely and call with any questions or concerns should they arise before scheduled appointment.  Would like for patient to follow-up in 2 weeks for reevaluation of right foot insect bite.  Patient verbalized understanding and agreeable to plan at this time.  No orders of the defined types were placed in this encounter.         Note is dictated utilizing voice recognition software. Unfortunately this leads to occasional typographical errors. I apologize in advance if the situation  occurs. If questions occur please do not hesitate to call our office.

## 2022-09-14 ENCOUNTER — OFFICE VISIT (OUTPATIENT)
Dept: PODIATRY | Facility: CLINIC | Age: 72
End: 2022-09-14

## 2022-09-14 VITALS — HEART RATE: 103 BPM | OXYGEN SATURATION: 96 % | HEIGHT: 77 IN | WEIGHT: 205 LBS | BODY MASS INDEX: 24.21 KG/M2

## 2022-09-14 DIAGNOSIS — Z79.4 TYPE 2 DIABETES MELLITUS WITH DIABETIC POLYNEUROPATHY, WITH LONG-TERM CURRENT USE OF INSULIN: Primary | ICD-10-CM

## 2022-09-14 DIAGNOSIS — R23.8 PAPULE OF SKIN: ICD-10-CM

## 2022-09-14 DIAGNOSIS — E11.42 TYPE 2 DIABETES MELLITUS WITH DIABETIC POLYNEUROPATHY, WITH LONG-TERM CURRENT USE OF INSULIN: Primary | ICD-10-CM

## 2022-09-14 PROCEDURE — 99212 OFFICE O/P EST SF 10 MIN: CPT

## 2022-09-15 NOTE — PROGRESS NOTES
"09/14/2022  Foot and Ankle Surgery - Established Patient/Follow-up  Provider: SHAWN Cerrato   Location: [facility]    Subjective:  Gurinder Amezcua is a 72 y.o. male.     Chief Complaint   Patient presents with   • Right Foot - Wound Check   • Follow-up     KIRK Prasad MD 06/01/2022       HPI: Patient presents to office today for reevaluation of papule to right dorsal foot.  Patient has been applying mupirocin ointment and covering with Band-Aid daily.  Patient reports that he feels the wound looks better and denies any symptoms to the foot.  Patient denies any issues with the left foot today.      Allergies   Allergen Reactions   • Adhesive Tape Itching     tegraderm patches causes itching   • Methocarbamol Itching and Rash     Arms etc. No hives.      • Pregabalin Confusion and Other (See Comments)     Hypnotic driving episode  Dizziness and inability to focus  Dizziness and inability to focus  Other reaction(s): Confusion  lyrica     • Silver Rash       Current Outpatient Medications on File Prior to Visit   Medication Sig Dispense Refill   • atorvastatin (LIPITOR) 80 MG tablet Take 80 mg by mouth Daily.     • Lancets (freestyle) lancets FreeStyle Lancets     • metFORMIN ER (GLUCOPHAGE-XR) 750 MG 24 hr tablet Take 2 tabs before breakfast. 180 tablet 3   • Toujeo SoloStar 300 UNIT/ML solution pen-injector injection Inject 30 Units under the skin into the appropriate area as directed Daily. Inject 25 units QHS     • Trulicity 0.75 MG/0.5ML solution pen-injector INJECT 0.75 MG UNDER THE SKIN WEEKLY     • mupirocin (BACTROBAN) 2 % ointment Apply once daily. 30 g 0   • nitroglycerin (NITROSTAT) 0.4 MG SL tablet Place 0.4 mg under the tongue.       No current facility-administered medications on file prior to visit.       Objective   Pulse 103   Ht 195.6 cm (77\")   Wt 93 kg (205 lb)   SpO2 96%   BMI 24.31 kg/m²     Foot/Ankle Exam:       General:   Appearance: appears stated age and healthy  "   Orientation: AAOx3    Affect: appropriate    Affect comment:  Flat affect  Gait: unimpaired    Assistance: independent    Shoe Gear:  Casual shoes    VASCULAR      Right Foot Vascularity   Right DP grade: Unable to palpate pedal pulse, normal MI.  Skin Temperature: warm    Edema Grading:  None  CFT:  < 3 seconds     MUSCULOSKELETAL      Right Foot Musculoskeletal   Ecchymosis:  None  Tenderness: none       DERMATOLOGIC     Right Foot Dermatologic   Skin: skin changes    Nails: onychomycosis, abnormally thick and dystrophic nails             Assessment & Plan   Diagnoses and all orders for this visit:    1. Type 2 diabetes mellitus with diabetic polyneuropathy, with long-term current use of insulin (HCC) (Primary)    2. Papule of skin      On exam right foot appears intact with no open wounds or signs of active acute infection.  Small papule to the right dorsal foot has healed with some resolving dry skin noted.  No drainage noted.  Patient can discontinue antibiotic ointment with Band-Aid.  Would like for patient to follow-up in about 8 weeks to 2 months for routine diabetic foot check and call with any questions or concerns should they arise before scheduled appointment.  Patient verbalized understanding and agreeable to plan at this time.    No orders of the defined types were placed in this encounter.         Note is dictated utilizing voice recognition software. Unfortunately this leads to occasional typographical errors. I apologize in advance if the situation occurs. If questions occur please do not hesitate to call our office.

## 2022-10-03 RX ORDER — FLURBIPROFEN SODIUM 0.3 MG/ML
SOLUTION/ DROPS OPHTHALMIC
Qty: 100 EACH | Refills: 1 | Status: SHIPPED | OUTPATIENT
Start: 2022-10-03

## 2022-10-03 NOTE — TELEPHONE ENCOUNTER
Halley came to me about patient called in upset, wanted to draw labs now for his January appointment and she advised  would like him to have drawn closer to his appointment. He was not happy with that answer, she ask him to hold so that she could get manager and he hung up on her.   I called patient and he was just as rude to me as he was Halley, he was cussing and I told him there was no reason to cuss at me when I am just trying to help him and he told me I wasn't doing a very good job at helping. I offered for him to give blood sugar Numbers to the educator for review, and he refused, said he wants a doctor not her. I ask him if he had mychart and again he said no and he wasn't interested. I told him I would put a note in to  and he again started cussing and told me I was no help to him and hung up on me. Besides him wanting a lab it is hard to decipher what he wants due to this yelling and cussing.

## 2022-10-31 ENCOUNTER — TELEPHONE (OUTPATIENT)
Dept: ENDOCRINOLOGY | Facility: CLINIC | Age: 72
End: 2022-10-31

## 2022-10-31 NOTE — TELEPHONE ENCOUNTER
Received call from patient telling me to cancel his end of Dec appt for labs as well as Jan 3rd for his appt. I asked if the patient would like to reschedule his appt at this time and he replied no. I then proceeded to tell him that he could call us back anytime to reschedule, he cut me off saying that he was not coming back to this office.   Appts were canceled.

## 2022-11-03 ENCOUNTER — HOSPITAL ENCOUNTER (EMERGENCY)
Facility: HOSPITAL | Age: 72
Discharge: HOME | End: 2022-11-03
Attending: EMERGENCY MEDICINE | Admitting: EMERGENCY MEDICINE

## 2022-11-03 VITALS
HEIGHT: 77 IN | HEART RATE: 87 BPM | TEMPERATURE: 97.5 F | RESPIRATION RATE: 15 BRPM | WEIGHT: 207.01 LBS | DIASTOLIC BLOOD PRESSURE: 78 MMHG | OXYGEN SATURATION: 97 % | BODY MASS INDEX: 24.44 KG/M2 | SYSTOLIC BLOOD PRESSURE: 125 MMHG

## 2022-11-03 DIAGNOSIS — E16.2 HYPOGLYCEMIA: Primary | ICD-10-CM

## 2022-11-03 LAB
ALBUMIN SERPL-MCNC: 4.2 G/DL (ref 3.5–5.2)
ALBUMIN/GLOB SERPL: 1.8 G/DL
ALP SERPL-CCNC: 104 U/L (ref 39–117)
ALT SERPL W P-5'-P-CCNC: 20 U/L (ref 1–41)
ANION GAP SERPL CALCULATED.3IONS-SCNC: 10 MMOL/L (ref 5–15)
AST SERPL-CCNC: 17 U/L (ref 1–40)
BILIRUB SERPL-MCNC: 0.4 MG/DL (ref 0–1.2)
BILIRUB UR QL STRIP: NEGATIVE
BUN SERPL-MCNC: 13 MG/DL (ref 8–23)
BUN/CREAT SERPL: 14.4 (ref 7–25)
BURR CELLS BLD QL SMEAR: ABNORMAL
CALCIUM SPEC-SCNC: 9.4 MG/DL (ref 8.6–10.5)
CHLORIDE SERPL-SCNC: 103 MMOL/L (ref 98–107)
CLARITY UR: CLEAR
CO2 SERPL-SCNC: 26 MMOL/L (ref 22–29)
COLOR UR: YELLOW
CREAT SERPL-MCNC: 0.9 MG/DL (ref 0.76–1.27)
DACRYOCYTES BLD QL SMEAR: ABNORMAL
DEPRECATED RDW RBC AUTO: 43.8 FL (ref 37–54)
EGFRCR SERPLBLD CKD-EPI 2021: 91.3 ML/MIN/1.73
EOSINOPHIL # BLD MANUAL: 0.19 10*3/MM3 (ref 0–0.4)
EOSINOPHIL NFR BLD MANUAL: 5 % (ref 0.3–6.2)
ERYTHROCYTE [DISTWIDTH] IN BLOOD BY AUTOMATED COUNT: 13.9 % (ref 12.3–15.4)
GLOBULIN UR ELPH-MCNC: 2.3 GM/DL
GLUCOSE BLDC GLUCOMTR-MCNC: 298 MG/DL (ref 70–105)
GLUCOSE SERPL-MCNC: 262 MG/DL (ref 65–99)
GLUCOSE UR STRIP-MCNC: ABNORMAL MG/DL
HBA1C MFR BLD: 10.2 % (ref 3.5–5.6)
HCT VFR BLD AUTO: 40.4 % (ref 37.5–51)
HGB BLD-MCNC: 13.4 G/DL (ref 13–17.7)
HGB UR QL STRIP.AUTO: NEGATIVE
HOLD SPECIMEN: NORMAL
KETONES UR QL STRIP: NEGATIVE
LARGE PLATELETS: ABNORMAL
LEUKOCYTE ESTERASE UR QL STRIP.AUTO: NEGATIVE
LYMPHOCYTES # BLD MANUAL: 1.37 10*3/MM3 (ref 0.7–3.1)
LYMPHOCYTES NFR BLD MANUAL: 11 % (ref 5–12)
MCH RBC QN AUTO: 30.3 PG (ref 26.6–33)
MCHC RBC AUTO-ENTMCNC: 33.2 G/DL (ref 31.5–35.7)
MCV RBC AUTO: 91.4 FL (ref 79–97)
MONOCYTES # BLD: 0.41 10*3/MM3 (ref 0.1–0.9)
NEUTROPHILS # BLD AUTO: 1.74 10*3/MM3 (ref 1.7–7)
NEUTROPHILS NFR BLD MANUAL: 37 % (ref 42.7–76)
NEUTS BAND NFR BLD MANUAL: 10 % (ref 0–5)
NITRITE UR QL STRIP: NEGATIVE
PH UR STRIP.AUTO: 7 [PH] (ref 5–8)
PLATELET # BLD AUTO: 137 10*3/MM3 (ref 140–450)
PMV BLD AUTO: 8.5 FL (ref 6–12)
POIKILOCYTOSIS BLD QL SMEAR: ABNORMAL
POTASSIUM SERPL-SCNC: 4.4 MMOL/L (ref 3.5–5.2)
PROT SERPL-MCNC: 6.5 G/DL (ref 6–8.5)
PROT UR QL STRIP: NEGATIVE
RBC # BLD AUTO: 4.42 10*6/MM3 (ref 4.14–5.8)
SCAN SLIDE: NORMAL
SMALL PLATELETS BLD QL SMEAR: ABNORMAL
SODIUM SERPL-SCNC: 139 MMOL/L (ref 136–145)
SP GR UR STRIP: 1.03 (ref 1–1.03)
UROBILINOGEN UR QL STRIP: ABNORMAL
VARIANT LYMPHS NFR BLD MANUAL: 1 % (ref 0–5)
VARIANT LYMPHS NFR BLD MANUAL: 36 % (ref 19.6–45.3)
WBC MORPH BLD: NORMAL
WBC NRBC COR # BLD: 3.7 10*3/MM3 (ref 3.4–10.8)
WHOLE BLOOD HOLD COAG: NORMAL

## 2022-11-03 PROCEDURE — 85007 BL SMEAR W/DIFF WBC COUNT: CPT | Performed by: EMERGENCY MEDICINE

## 2022-11-03 PROCEDURE — 82962 GLUCOSE BLOOD TEST: CPT

## 2022-11-03 PROCEDURE — 85025 COMPLETE CBC W/AUTO DIFF WBC: CPT | Performed by: EMERGENCY MEDICINE

## 2022-11-03 PROCEDURE — 99284 EMERGENCY DEPT VISIT MOD MDM: CPT

## 2022-11-03 PROCEDURE — 93005 ELECTROCARDIOGRAM TRACING: CPT | Performed by: EMERGENCY MEDICINE

## 2022-11-03 PROCEDURE — 81003 URINALYSIS AUTO W/O SCOPE: CPT | Performed by: EMERGENCY MEDICINE

## 2022-11-03 PROCEDURE — 80053 COMPREHEN METABOLIC PANEL: CPT | Performed by: EMERGENCY MEDICINE

## 2022-11-03 PROCEDURE — 83036 HEMOGLOBIN GLYCOSYLATED A1C: CPT | Performed by: EMERGENCY MEDICINE

## 2022-11-03 RX ORDER — SODIUM CHLORIDE 0.9 % (FLUSH) 0.9 %
10 SYRINGE (ML) INJECTION AS NEEDED
Status: DISCONTINUED | OUTPATIENT
Start: 2022-11-03 | End: 2022-11-03 | Stop reason: HOSPADM

## 2022-11-03 NOTE — DISCHARGE INSTRUCTIONS
Follow-up with Dr. Caldwell on November 9 as scheduled.  DropToujeo dose to 20 units daily.  Avoid prolonged fasting.  Check blood sugar regularly.  Return for fever, altered mental status, numbness, weakness, vomiting or any other concerns

## 2022-11-03 NOTE — ED PROVIDER NOTES
Subjective   History of Present Illness  Low blood sugars, transient altered mental status  72-year-old male with history of type 2 diabetes who is on insulin therapy states he has had some low blood sugar readings over the last few weeks.  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  He started feeling jittery.  He took some glucose tablets and apple juice.  States mental status started to clear up.  He left work and drove to the hospital for evaluation.  He reports no headache or focal numbness or weakness or blurry vision.  He did not have a glucometer at the time to check.  He states a few nights ago he had a low in the 40s.  Review of Systems   Constitutional: Negative for fever.   HENT: Negative.    Eyes: Negative.    Respiratory: Negative.    Cardiovascular: Negative.    Gastrointestinal: Negative.    Genitourinary: Negative.    Musculoskeletal: Negative.    Skin: Negative.    Neurological: Negative.    Psychiatric/Behavioral: Positive for confusion.       Past Medical History:   Diagnosis Date   • Cataracts, bilateral 04/2022   • Heart attack (HCC) 06/17/2021       Allergies   Allergen Reactions   • Adhesive Tape Itching     tegraderm patches causes itching   • Methocarbamol Itching and Rash     Arms etc. No hives.      • Pregabalin Confusion and Other (See Comments)     Hypnotic driving episode  Dizziness and inability to focus  Dizziness and inability to focus  Other reaction(s): Confusion  lyrica     • Silver Rash       Past Surgical History:   Procedure Laterality Date   • BACK SURGERY  2019    4 rods in back   • CARDIAC CATHETERIZATION  06/17/2021   • CORONARY STENT PLACEMENT  06/17/2021   • CRANIOTOMY     • ENDOSCOPY     • HERNIA REPAIR     • KNEE ACL RECONSTRUCTION     • KNEE ARTHROSCOPY     • LAMINECTOMY     • TRIGEMINAL NERVE DECOMPRESSION         Family History   Problem Relation Age of Onset  "  • Diabetes Mother    • Heart attack Father    • Aneurysm Father        Social History     Socioeconomic History   • Marital status:    Tobacco Use   • Smoking status: Never   • Smokeless tobacco: Never   Vaping Use   • Vaping Use: Never used   Substance and Sexual Activity   • Alcohol use: Yes     Comment: beer every couple days   • Drug use: Defer   • Sexual activity: Defer       Prior to Admission medications    Medication Sig Start Date End Date Taking? Authorizing Provider   atorvastatin (LIPITOR) 80 MG tablet Take 80 mg by mouth Daily.    Hailey Jacobs MD   B-D UF III MINI PEN NEEDLES 31G X 5 MM misc TEST EVERY DAY 10/3/22   La Mccormick MD   Lancets (freestyle) lancets FreeStyle Lancets    Hailey Jacobs MD   metFORMIN ER (GLUCOPHAGE-XR) 750 MG 24 hr tablet Take 2 tabs before breakfast. 6/8/22   aL Mccormick MD   mupirocin (BACTROBAN) 2 % ointment Apply once daily. 8/23/22   Dionne Abrams APRN   nitroglycerin (NITROSTAT) 0.4 MG SL tablet Place 0.4 mg under the tongue.    Hailey Jacobs MD Toujeo SolNiels 300 UNIT/ML solution pen-injector injection Inject 30 Units under the skin into the appropriate area as directed Daily. Inject 25 units QHS 8/1/22   La Mccormick MD   Trulicity 0.75 MG/0.5ML solution pen-injector INJECT 0.75 MG UNDER THE SKIN WEEKLY 4/6/22   Hailey Jacobs MD     /78   Pulse 87   Temp 97.5 °F (36.4 °C) (Oral)   Resp 15   Ht 195.6 cm (77\")   Wt 93.9 kg (207 lb 0.2 oz)   SpO2 97%   BMI 24.55 kg/m²   I examined the patient using the appropriate personal protective equipment.        Objective   Physical Exam  General: Well-developed well-appearing, no acute distress, alert and appropriate  Eyes: Pupils round and reactive, sclera nonicteric  HEENT: Mucous membranes moist, no mucosal swelling  Neck: Supple, no nuchal rigidity,  Respirations: Respirations nonlabored, equal breath sounds bilaterally, clear lungs  Heart " regular rate and rhythm, no murmurs rubs or gallops,   Abdomen soft nontender nondistended, no hepatosplenomegaly, no hernia, no mass, normal bowel sounds, no CVA tenderness  Extremities no clubbing cyanosis or edema, calves are symmetric and nontender  Neuro cranial nerves II through XII intact , normal sensory/motor function and strength in four extremities, no slurred speech, no facial droop, normal finger to nose, normal heel to shin, no nuchal rigidity  Psych oriented, cooperative  Skin no rash, brisk cap refill  Procedures           ED Course      Results for orders placed or performed during the hospital encounter of 11/03/22   Comprehensive Metabolic Panel    Specimen: Blood   Result Value Ref Range    Glucose 262 (H) 65 - 99 mg/dL    BUN 13 8 - 23 mg/dL    Creatinine 0.90 0.76 - 1.27 mg/dL    Sodium 139 136 - 145 mmol/L    Potassium 4.4 3.5 - 5.2 mmol/L    Chloride 103 98 - 107 mmol/L    CO2 26.0 22.0 - 29.0 mmol/L    Calcium 9.4 8.6 - 10.5 mg/dL    Total Protein 6.5 6.0 - 8.5 g/dL    Albumin 4.20 3.50 - 5.20 g/dL    ALT (SGPT) 20 1 - 41 U/L    AST (SGOT) 17 1 - 40 U/L    Alkaline Phosphatase 104 39 - 117 U/L    Total Bilirubin 0.4 0.0 - 1.2 mg/dL    Globulin 2.3 gm/dL    A/G Ratio 1.8 g/dL    BUN/Creatinine Ratio 14.4 7.0 - 25.0    Anion Gap 10.0 5.0 - 15.0 mmol/L    eGFR 91.3 >60.0 mL/min/1.73   Urinalysis With Culture If Indicated - Urine, Clean Catch    Specimen: Urine, Clean Catch   Result Value Ref Range    Color, UA Yellow Yellow, Straw    Appearance, UA Clear Clear    pH, UA 7.0 5.0 - 8.0    Specific Gravity, UA 1.033 (H) 1.005 - 1.030    Glucose, UA >=1000 mg/dL (3+) (A) Negative    Ketones, UA Negative Negative    Bilirubin, UA Negative Negative    Blood, UA Negative Negative    Protein, UA Negative Negative    Leuk Esterase, UA Negative Negative    Nitrite, UA Negative Negative    Urobilinogen, UA 1.0 E.U./dL 0.2 - 1.0 E.U./dL   CBC Auto Differential    Specimen: Blood   Result Value Ref Range     WBC 3.70 3.40 - 10.80 10*3/mm3    RBC 4.42 4.14 - 5.80 10*6/mm3    Hemoglobin 13.4 13.0 - 17.7 g/dL    Hematocrit 40.4 37.5 - 51.0 %    MCV 91.4 79.0 - 97.0 fL    MCH 30.3 26.6 - 33.0 pg    MCHC 33.2 31.5 - 35.7 g/dL    RDW 13.9 12.3 - 15.4 %    RDW-SD 43.8 37.0 - 54.0 fl    MPV 8.5 6.0 - 12.0 fL    Platelets 137 (L) 140 - 450 10*3/mm3   Scan Slide    Specimen: Blood   Result Value Ref Range    Scan Slide     Manual Differential    Specimen: Blood   Result Value Ref Range    Neutrophil % 37.0 (L) 42.7 - 76.0 %    Lymphocyte % 36.0 19.6 - 45.3 %    Monocyte % 11.0 5.0 - 12.0 %    Eosinophil % 5.0 0.3 - 6.2 %    Bands %  10.0 (H) 0.0 - 5.0 %    Atypical Lymphocyte % 1.0 0.0 - 5.0 %    Neutrophils Absolute 1.74 1.70 - 7.00 10*3/mm3    Lymphocytes Absolute 1.37 0.70 - 3.10 10*3/mm3    Monocytes Absolute 0.41 0.10 - 0.90 10*3/mm3    Eosinophils Absolute 0.19 0.00 - 0.40 10*3/mm3    [place] Cells Slight/1+ None Seen    Dacrocytes Slight/1+ None Seen    Poikilocytes Slight/1+ None Seen    WBC Morphology Normal Normal    Platelet Estimate Decreased Normal    Large Platelets Slight/1+ None Seen   POC Glucose Once    Specimen: Blood   Result Value Ref Range    Glucose 298 (H) 70 - 105 mg/dL   ECG 12 Lead Altered Mental Status   Result Value Ref Range    QT Interval 341 ms   Gold Top - SST   Result Value Ref Range    Extra Tube Hold for add-ons.    Light Blue Top   Result Value Ref Range    Extra Tube Hold for add-ons.                                           MDM  My EKG interpretation sinus rhythm, rate of 87, no acute ST or T wave abnormality  Patient presents describing hypoglycemic spells over the last few days.  He is on long-acting insulin for type 2 diabetes.  States he did miss eating prior to work today.  I did recommend that he drop his Toujeo dose to 20 units daily from his reported 27 units daily.  He has an upcoming appointment for his diabetes care on the ninth.  During the emergency room work-up today he has an  ongoing normal mental status he has a normal neurologic exam no sign of CVA.  EKG shows no ischemic abnormalities he is afebrile and has no leukocytosis.  Hemoglobin A1c has been ordered and pending at the time of discharge.  He was given warning signs for return.  He did voiced understanding to the plan.  Final diagnoses:   Hypoglycemia       ED Disposition  ED Disposition     ED Disposition   Discharge    Condition   Stable    Comment   --             No follow-up provider specified.       Medication List      No changes were made to your prescriptions during this visit.          Melquiades Jurado MD  11/03/22 0521

## 2022-11-06 LAB — QT INTERVAL: 341 MS

## 2022-11-14 ENCOUNTER — OFFICE VISIT (OUTPATIENT)
Dept: PODIATRY | Facility: CLINIC | Age: 72
End: 2022-11-14

## 2022-11-14 VITALS — BODY MASS INDEX: 24.44 KG/M2 | WEIGHT: 207 LBS | HEIGHT: 77 IN | RESPIRATION RATE: 20 BRPM

## 2022-11-14 DIAGNOSIS — Z79.4 TYPE 2 DIABETES MELLITUS WITH DIABETIC POLYNEUROPATHY, WITH LONG-TERM CURRENT USE OF INSULIN: Primary | ICD-10-CM

## 2022-11-14 DIAGNOSIS — E11.42 TYPE 2 DIABETES MELLITUS WITH DIABETIC POLYNEUROPATHY, WITH LONG-TERM CURRENT USE OF INSULIN: Primary | ICD-10-CM

## 2022-11-14 DIAGNOSIS — B35.1 ONYCHOMYCOSIS: ICD-10-CM

## 2022-11-14 DIAGNOSIS — E11.42 DIABETIC PERIPHERAL NEUROPATHY ASSOCIATED WITH TYPE 2 DIABETES MELLITUS: ICD-10-CM

## 2022-11-14 PROCEDURE — 11721 DEBRIDE NAIL 6 OR MORE: CPT

## 2022-11-14 RX ORDER — NIRMATRELVIR AND RITONAVIR 300-100 MG
KIT ORAL SEE ADMIN INSTRUCTIONS
COMMUNITY
Start: 2022-08-31

## 2022-11-14 RX ORDER — PANTOPRAZOLE SODIUM 40 MG/1
40 TABLET, DELAYED RELEASE ORAL DAILY
COMMUNITY
Start: 2022-10-15

## 2022-11-14 NOTE — PROGRESS NOTES
11/14/2022  Foot and Ankle Surgery - Established Patient/Follow-up  Provider: SHAWN Cerrato   Location: [facility]    Subjective:  Gurinder Amezcua is a 72 y.o. male.     Chief Complaint   Patient presents with   • Right Foot - Follow-up, Diabetes, Nail Problem, Peripheral Neuropathy     Dm foot care   • Left Foot - Follow-up, Diabetes, Nail Problem, Peripheral Neuropathy     Dm foot care   • Follow-up     KIRK Prasad md  11/2022   date unknown       The patient presents for a routine diabetic foot check.    The patient reports his blood glucose levels are not well-controlled. He states his levels will go from below 40 mg/dL to 300mg/dL. The patient notes he will be seeing a new physician on 12/07/2022. He states his nails were last trimmed 2 months prior and does wish to have them trimmed today. The patient reports he is not going to [facility] as longer as he believes they have not helped. He denies having any questions. The patient reports he tries to keep his feet clean and rotate his shoes frequently. He states he never goes without shoes.     Allergies   Allergen Reactions   • Adhesive Tape Itching     tegraderm patches causes itching   • Methocarbamol Itching and Rash     Arms etc. No hives.      • Pregabalin Confusion and Other (See Comments)     Hypnotic driving episode  Dizziness and inability to focus  Dizziness and inability to focus  Other reaction(s): Confusion  lyrica     • Silver Rash       Current Outpatient Medications on File Prior to Visit   Medication Sig Dispense Refill   • atorvastatin (LIPITOR) 80 MG tablet Take 80 mg by mouth Daily.     • B-D UF III MINI PEN NEEDLES 31G X 5 MM misc TEST EVERY  each 1   • Lancets (freestyle) lancets FreeStyle Lancets     • metFORMIN ER (GLUCOPHAGE-XR) 750 MG 24 hr tablet Take 2 tabs before breakfast. 180 tablet 3   • pantoprazole (PROTONIX) 40 MG EC tablet Take 1 tablet by mouth Daily.     • Paxlovid, 300/100, 20 x 150 MG &  "10 x 100MG tablet therapy pack tablet See Admin Instructions.     • Toujeo SoloStar 300 UNIT/ML solution pen-injector injection Inject 30 Units under the skin into the appropriate area as directed Daily. Inject 25 units QHS     • Trulicity 0.75 MG/0.5ML solution pen-injector INJECT 0.75 MG UNDER THE SKIN WEEKLY     • mupirocin (BACTROBAN) 2 % ointment Apply once daily. 30 g 0   • nitroglycerin (NITROSTAT) 0.4 MG SL tablet Place 0.4 mg under the tongue.       No current facility-administered medications on file prior to visit.       Objective   Resp 20   Ht 195.6 cm (77\")   Wt 93.9 kg (207 lb)   BMI 24.55 kg/m²     Foot/Ankle Exam:       General:   Diabetic Foot Exam Performed    Appearance: appears stated age and healthy    Orientation: AAOx3    Affect: appropriate    Affect comment:  Flat affect  Gait: unimpaired    Assistance: independent    Shoe Gear:  Casual shoes and socks    VASCULAR      Right Foot Vascularity   Right DP grade: Unable to palpate pedal pulse, normal MI.  Posterior tibial:  1+  Skin Temperature: warm    Edema Grading:  None  CFT:  < 3 seconds  Varicosities: none       Left Foot Vascularity   Posterior tibial:  1+  Skin Temperature: warm    Edema Grading:  None  CFT:  < 3 seconds  Varicosities: none        NEUROLOGIC     Right Foot Neurologic   Light touch sensation:  Diminished  Protective Sensation using Converse-Yuri Monofilament:  Diminished     Left Foot Neurologic   Light touch sensation:  Diminished  Protective Sensation using Converse-Yuri Monofilament:  Diminished     MUSCULOSKELETAL      Right Foot Musculoskeletal   Ecchymosis:  None  Tenderness: none       Left Foot Musculoskeletal   Ecchymosis:  None  Tenderness: none       DERMATOLOGIC     Right Foot Dermatologic   Skin: skin changes    Nails: onychomycosis, abnormally thick and dystrophic nails       Left Foot Dermatologic   Skin: skin changes    Nails: onychomycosis, abnormally thick and dystrophic nails  "         Assessment & Plan   Diagnoses and all orders for this visit:    1. Type 2 diabetes mellitus with diabetic polyneuropathy, with long-term current use of insulin (HCC) (Primary)    2. Onychomycosis    3. Diabetic peripheral neuropathy associated with type 2 diabetes mellitus (HCC)      The patient is at moderate risk for pedal complications related to diabetes. Explained importance of diabetic foot care, daily foot checks, and glycemic control. Patient should check both feet on a daily basis, monitor and control blood sugars, make sure that both feet and in between toes are towel dried after baths or showers. Avoid barefoot walking at all times. Check shoes before putting them on.   Patient was given information on proper foot care. Call the office at the first signs of a wound or with signs of infection.    Nail debridement: Bilateral feet x10    Consent and time out was performed before proceeding with the procedure. Nails were debrided with a nail nipper without complication.  No anesthesia was required.  Indications for procedure were thickened, dystrophic, and fungal appearing nails which are difficult to trim.  Proper self-care and technique was discussed with patient.  Patient was stable after procedure.    The patient is to follow up in 3 months for routine diabetic foot check.     No orders of the defined types were placed in this encounter.       Transcribed from ambient dictation for SHAWN Silver by Gali Gaspar .  11/14/22   16:01 EST    Patient or patient representative verbalized consent to the visit recording.  I have personally performed the services described in this document as transcribed by the above individual, and it is both accurate and complete.  SHAWN Silver  11/14/2022  17:37 EST

## 2023-02-14 ENCOUNTER — OFFICE VISIT (OUTPATIENT)
Dept: CARDIOLOGY | Facility: CLINIC | Age: 73
End: 2023-02-14
Payer: MEDICARE

## 2023-02-14 VITALS
WEIGHT: 214.5 LBS | HEART RATE: 87 BPM | DIASTOLIC BLOOD PRESSURE: 71 MMHG | OXYGEN SATURATION: 95 % | BODY MASS INDEX: 25.33 KG/M2 | SYSTOLIC BLOOD PRESSURE: 112 MMHG | HEIGHT: 77 IN

## 2023-02-14 DIAGNOSIS — Z79.4 TYPE 2 DIABETES MELLITUS WITH DIABETIC POLYNEUROPATHY, WITH LONG-TERM CURRENT USE OF INSULIN: ICD-10-CM

## 2023-02-14 DIAGNOSIS — I25.10 CORONARY ARTERY DISEASE INVOLVING NATIVE CORONARY ARTERY OF NATIVE HEART WITHOUT ANGINA PECTORIS: Primary | ICD-10-CM

## 2023-02-14 DIAGNOSIS — E11.42 TYPE 2 DIABETES MELLITUS WITH DIABETIC POLYNEUROPATHY, WITH LONG-TERM CURRENT USE OF INSULIN: ICD-10-CM

## 2023-02-14 DIAGNOSIS — I10 PRIMARY HYPERTENSION: ICD-10-CM

## 2023-02-14 DIAGNOSIS — E78.00 PURE HYPERCHOLESTEROLEMIA: ICD-10-CM

## 2023-02-14 PROCEDURE — 99214 OFFICE O/P EST MOD 30 MIN: CPT | Performed by: INTERNAL MEDICINE

## 2023-02-14 RX ORDER — ASPIRIN 81 MG/1
81 TABLET ORAL DAILY
COMMUNITY

## 2023-02-14 NOTE — PROGRESS NOTES
"    Subjective:     Encounter Date:2023      Patient ID: uGrinder Amezcua is a 72 y.o. male.    Chief Complaint:  History of Present Illness 72-year-old white male with history of coronary disease hypertension hyperlipidemia diabetes presents to office for follow-up.  Patient is currently stable without any signs of chest pain or shortness of breath at rest or exertion.  No complains any PND orthopnea.  No palpitations but has some dizziness but no syncope or swelling of the feet.  Patient has been taking all medicines regularly.  Patient does not smoke.    The following portions of the patient's history were reviewed and updated as appropriate: allergies, current medications, past family history, past medical history, past social history, past surgical history and problem list.  Past Medical History:   Diagnosis Date   • Cataracts, bilateral 2022   • Deep vein thrombosis (HCC) 2018 approx    none since   • Diabetes mellitus (HCC) 2017 approx    Type 2   • Heart attack (HCC) 2021     Past Surgical History:   Procedure Laterality Date   • BACK SURGERY      4 rods in back   • CARDIAC CATHETERIZATION  2021   • CORONARY STENT PLACEMENT  2021   • CRANIOTOMY     • ENDOSCOPY     • HERNIA REPAIR     • KNEE ACL RECONSTRUCTION     • KNEE ARTHROSCOPY     • LAMINECTOMY     • TRIGEMINAL NERVE DECOMPRESSION       /71   Pulse 87   Ht 195.6 cm (77\")   Wt 97.3 kg (214 lb 8 oz)   SpO2 95%   BMI 25.44 kg/m²   Family History   Problem Relation Age of Onset   • Diabetes Mother    • Heart attack Father            • Aneurysm Father        Current Outpatient Medications:   •  aspirin 81 MG EC tablet, Take 81 mg by mouth Daily., Disp: , Rfl:   •  atorvastatin (LIPITOR) 80 MG tablet, Take 80 mg by mouth Daily., Disp: , Rfl:   •  B-D UF III MINI PEN NEEDLES 31G X 5 MM misc, TEST EVERY DAY, Disp: 100 each, Rfl: 1  •  Lancets (freestyle) lancets, FreeStyle Lancets, Disp: , Rfl:   •  metFORMIN ER " (GLUCOPHAGE-XR) 750 MG 24 hr tablet, Take 2 tabs before breakfast., Disp: 180 tablet, Rfl: 3  •  mupirocin (BACTROBAN) 2 % ointment, Apply once daily., Disp: 30 g, Rfl: 0  •  nitroglycerin (NITROSTAT) 0.4 MG SL tablet, Place 0.4 mg under the tongue., Disp: , Rfl:   •  pantoprazole (PROTONIX) 40 MG EC tablet, Take 1 tablet by mouth Daily., Disp: , Rfl:   •  Paxlovid, 300/100, 20 x 150 MG & 10 x 100MG tablet therapy pack tablet, See Admin Instructions., Disp: , Rfl:   •  Toujeo SoloStar 300 UNIT/ML solution pen-injector injection, Inject 30 Units under the skin into the appropriate area as directed Daily. Inject 25 units QHS, Disp: , Rfl:   •  Trulicity 0.75 MG/0.5ML solution pen-injector, INJECT 0.75 MG UNDER THE SKIN WEEKLY, Disp: , Rfl:   Allergies   Allergen Reactions   • Adhesive Tape Itching     tegraderm patches causes itching   • Methocarbamol Itching and Rash     Arms etc. No hives.      • Pregabalin Confusion and Other (See Comments)     Hypnotic driving episode  Dizziness and inability to focus  Dizziness and inability to focus  Other reaction(s): Confusion  lyrica     • Silver Rash     Social History     Socioeconomic History   • Marital status:    Tobacco Use   • Smoking status: Never   • Smokeless tobacco: Never   Vaping Use   • Vaping Use: Never used   Substance and Sexual Activity   • Alcohol use: Yes     Alcohol/week: 4.0 standard drinks     Types: 4 Cans of beer per week     Comment: or less per week   • Drug use: Never   • Sexual activity: Never     Review of Systems   Constitutional: Negative for malaise/fatigue.   Cardiovascular: Negative for chest pain, dyspnea on exertion, leg swelling and palpitations.   Respiratory: Negative for cough and shortness of breath.    Gastrointestinal: Negative for abdominal pain, nausea and vomiting.   Neurological: Positive for dizziness and numbness. Negative for focal weakness, headaches and light-headedness.   All other systems reviewed and are  negative.             Objective:     Constitutional:       Appearance: Well-developed.   Eyes:      General: No scleral icterus.     Conjunctiva/sclera: Conjunctivae normal.   HENT:      Head: Normocephalic and atraumatic.   Neck:      Vascular: No carotid bruit or JVD.   Pulmonary:      Effort: Pulmonary effort is normal.      Breath sounds: Normal breath sounds. No wheezing. No rales.   Cardiovascular:      Normal rate. Regular rhythm.   Pulses:     Intact distal pulses.   Abdominal:      General: Bowel sounds are normal.      Palpations: Abdomen is soft.   Musculoskeletal:      Cervical back: Normal range of motion and neck supple. Skin:     General: Skin is warm and dry.      Findings: No rash.   Neurological:      Mental Status: Alert.       Procedures    Lab Review:         MDM  1.  Coronary disease  Patient has nonobstructive disease in the past and is currently stable on medications with normal function  2.  Hyperlipidemia  Patient is currently on Lipitor and the lipid numbers are well within normal limits  3.  Diabetes  Patient is currently on multiple medications and followed by primary care doctor  4.  Hypertension  Patient blood pressure is actually stable without any medicines      Patient's previous medical records, labs, and EKG were reviewed and discussed with the patient at today's visit.

## 2023-05-11 ENCOUNTER — OFFICE (OUTPATIENT)
Dept: URBAN - METROPOLITAN AREA PATHOLOGY 4 | Facility: PATHOLOGY | Age: 73
End: 2023-05-11
Payer: COMMERCIAL

## 2023-05-11 ENCOUNTER — OFFICE (OUTPATIENT)
Dept: URBAN - METROPOLITAN AREA PATHOLOGY 4 | Facility: PATHOLOGY | Age: 73
End: 2023-05-11

## 2023-05-11 ENCOUNTER — ON CAMPUS - OUTPATIENT (OUTPATIENT)
Dept: URBAN - METROPOLITAN AREA HOSPITAL 2 | Facility: HOSPITAL | Age: 73
End: 2023-05-11

## 2023-05-11 VITALS
DIASTOLIC BLOOD PRESSURE: 83 MMHG | DIASTOLIC BLOOD PRESSURE: 75 MMHG | RESPIRATION RATE: 16 BRPM | HEART RATE: 88 BPM | SYSTOLIC BLOOD PRESSURE: 110 MMHG | RESPIRATION RATE: 14 BRPM | SYSTOLIC BLOOD PRESSURE: 127 MMHG | HEART RATE: 89 BPM | HEART RATE: 87 BPM | HEART RATE: 82 BPM | SYSTOLIC BLOOD PRESSURE: 122 MMHG | DIASTOLIC BLOOD PRESSURE: 84 MMHG | DIASTOLIC BLOOD PRESSURE: 77 MMHG | SYSTOLIC BLOOD PRESSURE: 116 MMHG | OXYGEN SATURATION: 99 % | SYSTOLIC BLOOD PRESSURE: 119 MMHG | OXYGEN SATURATION: 94 % | OXYGEN SATURATION: 96 % | RESPIRATION RATE: 12 BRPM | OXYGEN SATURATION: 97 % | DIASTOLIC BLOOD PRESSURE: 71 MMHG | TEMPERATURE: 97.7 F | HEART RATE: 85 BPM | SYSTOLIC BLOOD PRESSURE: 112 MMHG | OXYGEN SATURATION: 98 % | HEIGHT: 77 IN | WEIGHT: 216 LBS | DIASTOLIC BLOOD PRESSURE: 85 MMHG

## 2023-05-11 DIAGNOSIS — K22.2 ESOPHAGEAL OBSTRUCTION: ICD-10-CM

## 2023-05-11 DIAGNOSIS — K20.80 OTHER ESOPHAGITIS WITHOUT BLEEDING: ICD-10-CM

## 2023-05-11 DIAGNOSIS — K31.7 POLYP OF STOMACH AND DUODENUM: ICD-10-CM

## 2023-05-11 DIAGNOSIS — K44.9 DIAPHRAGMATIC HERNIA WITHOUT OBSTRUCTION OR GANGRENE: ICD-10-CM

## 2023-05-11 DIAGNOSIS — T18.2XXA FOREIGN BODY IN STOMACH, INITIAL ENCOUNTER: ICD-10-CM

## 2023-05-11 DIAGNOSIS — R13.10 DYSPHAGIA, UNSPECIFIED: ICD-10-CM

## 2023-05-11 LAB
GI HISTOLOGY: A. SELECT: (no result)
GI HISTOLOGY: PDF REPORT: (no result)

## 2023-05-11 PROCEDURE — 43450 DILATE ESOPHAGUS 1/MULT PASS: CPT | Performed by: INTERNAL MEDICINE

## 2023-05-11 PROCEDURE — 88305 TISSUE EXAM BY PATHOLOGIST: CPT | Mod: 26 | Performed by: INTERNAL MEDICINE

## 2023-05-11 PROCEDURE — 43239 EGD BIOPSY SINGLE/MULTIPLE: CPT | Performed by: INTERNAL MEDICINE

## 2023-05-11 RX ORDER — PANTOPRAZOLE 40 MG/1
80 TABLET, DELAYED RELEASE ORAL
Qty: 180 | Refills: 3 | Status: COMPLETED
End: 2024-07-02

## 2023-05-11 NOTE — SERVICEHPINOTES
ARACELI JORGENSEN  is a  73  male   who presents today for a  EGD   for   the indications listed below. The updated Patient Profile was reviewed prior to the procedure, in conjunction with the Physical Exam, including medical conditions, surgical procedures, medications, allergies, family history and social history. See Physical Exam time stamp below for date and time of HPI completion.Pre-operatively, I reviewed the indication(s) for the procedure, the risks of the procedure [including but not limited to: unexpected bleeding possibly requiring hospitalization and/or unplanned repeat procedures, perforation possibly requiring surgical treatment, missed lesions and complications of sedation/MAC (also explained by anesthesia staff)]. I have evaluated the patient for risks associated with the planned anesthesia and the procedure to be performed and find the patient an acceptable candidate for IV sedation.Multiple opportunities were provided for any questions or concerns, and all questions were answered satisfactorily before any anesthesia was administered. We will proceed with the planned procedure.br

## 2023-07-23 ENCOUNTER — APPOINTMENT (OUTPATIENT)
Dept: CT IMAGING | Facility: HOSPITAL | Age: 73
End: 2023-07-23
Payer: MEDICARE

## 2023-07-23 ENCOUNTER — HOSPITAL ENCOUNTER (EMERGENCY)
Facility: HOSPITAL | Age: 73
Discharge: HOME | End: 2023-07-23
Attending: EMERGENCY MEDICINE | Admitting: EMERGENCY MEDICINE
Payer: MEDICARE

## 2023-07-23 VITALS
HEIGHT: 77 IN | DIASTOLIC BLOOD PRESSURE: 69 MMHG | HEART RATE: 74 BPM | BODY MASS INDEX: 25.38 KG/M2 | SYSTOLIC BLOOD PRESSURE: 110 MMHG | TEMPERATURE: 97.8 F | OXYGEN SATURATION: 93 % | WEIGHT: 214.95 LBS | RESPIRATION RATE: 18 BRPM

## 2023-07-23 DIAGNOSIS — M54.2 NECK PAIN: Primary | ICD-10-CM

## 2023-07-23 PROCEDURE — 72125 CT NECK SPINE W/O DYE: CPT

## 2023-07-23 PROCEDURE — 25010000002 HYDROMORPHONE 1 MG/ML SOLUTION: Performed by: EMERGENCY MEDICINE

## 2023-07-23 PROCEDURE — 99283 EMERGENCY DEPT VISIT LOW MDM: CPT

## 2023-07-23 PROCEDURE — 63710000001 ONDANSETRON PER 8 MG: Performed by: EMERGENCY MEDICINE

## 2023-07-23 PROCEDURE — 96372 THER/PROPH/DIAG INJ SC/IM: CPT

## 2023-07-23 RX ORDER — HYDROCODONE BITARTRATE AND ACETAMINOPHEN 7.5; 325 MG/1; MG/1
1 TABLET ORAL EVERY 4 HOURS PRN
Qty: 10 TABLET | Refills: 0 | Status: SHIPPED | OUTPATIENT
Start: 2023-07-23

## 2023-07-23 RX ORDER — ONDANSETRON 4 MG/1
4 TABLET, FILM COATED ORAL ONCE
Status: COMPLETED | OUTPATIENT
Start: 2023-07-23 | End: 2023-07-23

## 2023-07-23 RX ADMIN — ONDANSETRON HYDROCHLORIDE 4 MG: 4 TABLET, FILM COATED ORAL at 05:03

## 2023-07-23 RX ADMIN — HYDROMORPHONE HYDROCHLORIDE 1 MG: 1 INJECTION, SOLUTION INTRAMUSCULAR; INTRAVENOUS; SUBCUTANEOUS at 05:03

## 2023-07-23 NOTE — DISCHARGE INSTRUCTIONS
Follow-up with the [facility], call [phone] to schedule appointment.  Cool compress, deep tissue massage, Norco as needed for breakthrough pain.  Return for increased pain, numbness, weakness or any other concerns

## 2023-07-23 NOTE — ED PROVIDER NOTES
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  He states a couple of days ago he turned his head hard and he felt a pop in the left back of his neck and has had some increased pain since that time.  He reports no numbness or weakness or headache.  He states the pain does radiate to his left shoulder.  Review of Systems    Past Medical History:   Diagnosis Date    Cataracts, bilateral 2022    Deep vein thrombosis 2018 approx    none since    Diabetes mellitus 2017 approx    Type 2    Heart attack 2021       Allergies   Allergen Reactions    Adhesive Tape Itching     tegraderm patches causes itching    Methocarbamol Itching and Rash     Arms etc. No hives.       Pregabalin Confusion and Other (See Comments)     Hypnotic driving episode  Dizziness and inability to focus  Dizziness and inability to focus  Other reaction(s): Confusion  lyrica      Silver Rash       Past Surgical History:   Procedure Laterality Date    BACK SURGERY  2019    4 rods in back    CARDIAC CATHETERIZATION  2021    CORONARY STENT PLACEMENT  2021    CRANIOTOMY      ENDOSCOPY      HERNIA REPAIR      KNEE ACL RECONSTRUCTION      KNEE ARTHROSCOPY      LAMINECTOMY      TRIGEMINAL NERVE DECOMPRESSION         Family History   Problem Relation Age of Onset    Diabetes Mother     Heart attack Father             Aneurysm Father        Social History     Socioeconomic History    Marital status:    Tobacco Use    Smoking status: Never    Smokeless tobacco: Never   Vaping Use    Vaping Use: Never used   Substance and Sexual Activity    Alcohol use: Yes     Alcohol/week: 4.0 standard drinks     Types: 4 Cans of beer per week     Comment: or less per week    Drug use: Never    Sexual activity: Never     Prior to Admission medications    Medication Sig Start Date End Date Taking? Authorizing  "Provider   aspirin 81 MG EC tablet Take 81 mg by mouth Daily.    Hailey Jacobs MD   atorvastatin (LIPITOR) 80 MG tablet Take 80 mg by mouth Daily.    Hailey Jacobs MD   B-D UF III MINI PEN NEEDLES 31G X 5 MM misc TEST EVERY DAY 10/3/22   La Mccormick MD   HYDROcodone-acetaminophen (NORCO) 7.5-325 MG per tablet Take 1 tablet by mouth Every 4 (Four) Hours As Needed for Moderate Pain. 7/23/23   Melquiades Jurado MD   Lancets (freestyle) lancets FreeStyle Lancets    Hailey Jacobs MD   metFORMIN ER (GLUCOPHAGE-XR) 750 MG 24 hr tablet Take 2 tabs before breakfast. 6/8/22   La Mccormick MD   mupirocin (BACTROBAN) 2 % ointment Apply once daily. 8/23/22   Dionne Abrams APRN   nitroglycerin (NITROSTAT) 0.4 MG SL tablet Place 0.4 mg under the tongue.    Hailey Jacobs MD   pantoprazole (PROTONIX) 40 MG EC tablet Take 1 tablet by mouth Daily. 10/15/22   Hailey Jacobs MD   Paxlovid, 300/100, 20 x 150 MG & 10 x 100MG tablet therapy pack tablet See Admin Instructions. 8/31/22   Hailey Jacobs MD Toujeo SoloStar 300 UNIT/ML solution pen-injector injection Inject 30 Units under the skin into the appropriate area as directed Daily. Inject 25 units QHS 8/1/22   La Mccormick MD   Trulicity 0.75 MG/0.5ML solution pen-injector INJECT 0.75 MG UNDER THE SKIN WEEKLY 4/6/22   Hailey Jacobs MD     /81   Pulse 85   Temp 97.8 °F (36.6 °C)   Resp 18   Ht 195.6 cm (77\")   Wt 97.5 kg (214 lb 15.2 oz)   SpO2 97%   BMI 25.49 kg/m²         Objective   Physical Exam  General: Well-appearing, no acute distress  Psych: Oriented, pleasant affect  Neck: There is tenderness palpation along the left paraspinous region of his posterior lateral neck, no soft tissue swelling, no midline tenderness,  Equal sensorimotor function and strength in the bilateral upper extremities  Heart regular rate and rhythm  Respirations: Clear, nonlabored respirations  Skin: No " rash, normal color  Procedures           ED Course         CT Cervical Spine Without Contrast    Result Date: 7/23/2023  Impression: No acute osseous abnormality. Mild to moderate degenerative changes are present throughout the spine, most pronounced at C6/C7. Electronically Signed: Manisha Diaz  7/23/2023 5:25 AM EDT  Workstation ID: [id number]                                     Medical Decision Making  Patient presents with some musculoskeletal neck pain.  CT scan negative for acute bony injury showing some degenerative change.  He was advised the findings.  He was given Norco for discomfort, inspect report ordered.  He is referred to the spine center.  Notably he does not have findings of cord injury or any hard signs of radiculopathy.  He is given warning signs for return and discharged in good condition.    Problems Addressed:  Neck pain: complicated acute illness or injury    Amount and/or Complexity of Data Reviewed  Radiology: ordered and independent interpretation performed.     Details: My independent interpretation of CT C-spine image, chronic degenerative changes no apparent acute bony injury    Risk  Prescription drug management.        Final diagnoses:   Neck pain       ED Disposition  ED Disposition       ED Disposition   Discharge    Condition   Stable    Comment   --               Marissa Prasad MD  [address] [facility] [place] [address] [phone]    Schedule an appointment as soon as possible for a visit in 1 week           Medication List        New Prescriptions      HYDROcodone-acetaminophen 7.5-325 MG per tablet  Commonly known as: NORCO  Take 1 tablet by mouth Every 4 (Four) Hours As Needed for Moderate Pain.               Where to Get Your Medications        These medications were sent to [id number] - [place], [place] - [address] [place] [address] AT SEC OF [facility] [address] [phone] [place] [phone] [address], [place] [address]       Phone: [phone]   HYDROcodone-acetaminophen 7.5-325 MG per tablet            Melquiades Jurado MD  07/23/23 0611

## 2023-07-27 NOTE — PROGRESS NOTES
Neurosurgical Consultation      Gurinder Amezcua is a 73 y.o. male is being seen for consultation today at the request of Melquiades Jurado MD for neck pain. Today patient reports neck pain traveling his left shoulder,arm and hand.    Chief Complaint   Patient presents with    Neck Pain     New evaluation        Previous treatment: Hydrocodone,    HPI: This is a 73-year-old gentleman with a BMI of 25 as well as a history of diabetes with a most recent hemoglobin A1c of 10.2%, history of coronary stenting as well as a prior stroke who presents with acute left neck and left arm pain.  Of note he has had 4 lumbar spinal surgeries as well as a history of trigeminal neuralgia and 7 surgeries to attempt to address this.  Approximately 1 week ago he had an extreme left-sided neck pain that radiated into his left arm.  It is in a distribution of approximately the C6 nerve.  The pain was severe enough that he went to the emergency department.  He was given opioid pain medicine.  He feels as though he does not get significant improvement with this.  He has utilized ice and heat with some improvement.  He has had no physical therapy and no spinal canal injections.  He explicitly notes that he has never had a neck surgery however he has a scar on the anterior portion of the left side of his neck and the imaging is relatively clear for a prior C5-C6 anterior cervical discectomy and fusion.  He is currently on a daily aspirin.    Past Medical History:   Diagnosis Date    Cataracts, bilateral 04/2022    Deep vein thrombosis 2018 approx    none since    Diabetes mellitus 2017 approx    Type 2    Heart attack 06/17/2021        Past Surgical History:   Procedure Laterality Date    BACK SURGERY  2019    4 rods in back    CARDIAC CATHETERIZATION  06/17/2021    CORONARY STENT PLACEMENT  06/17/2021    CRANIOTOMY      ENDOSCOPY      HERNIA REPAIR      KNEE ACL RECONSTRUCTION      KNEE ARTHROSCOPY      LAMINECTOMY      TRIGEMINAL NERVE  DECOMPRESSION          Current Outpatient Medications on File Prior to Visit   Medication Sig Dispense Refill    aspirin 81 MG EC tablet Take 1 tablet by mouth Daily.      atorvastatin (LIPITOR) 80 MG tablet Take 1 tablet by mouth Daily.      B-D UF III MINI PEN NEEDLES 31G X 5 MM misc TEST EVERY  each 1    HYDROcodone-acetaminophen (NORCO) 7.5-325 MG per tablet Take 1 tablet by mouth Every 4 (Four) Hours As Needed for Moderate Pain. 10 tablet 0    Lancets (freestyle) lancets FreeStyle Lancets      metFORMIN ER (GLUCOPHAGE-XR) 750 MG 24 hr tablet Take 2 tabs before breakfast. 180 tablet 3    pantoprazole (PROTONIX) 40 MG EC tablet Take 1 tablet by mouth Daily.      Toujeo SoloStar 300 UNIT/ML solution pen-injector injection Inject 30 Units under the skin into the appropriate area as directed Daily. Inject 25 units QHS      Trulicity 0.75 MG/0.5ML solution pen-injector INJECT 0.75 MG UNDER THE SKIN WEEKLY      [DISCONTINUED] mupirocin (BACTROBAN) 2 % ointment Apply once daily. 30 g 0    [DISCONTINUED] nitroglycerin (NITROSTAT) 0.4 MG SL tablet Place 0.4 mg under the tongue.      [DISCONTINUED] Paxlovid, 300/100, 20 x 150 MG & 10 x 100MG tablet therapy pack tablet See Admin Instructions.       No current facility-administered medications on file prior to visit.        Allergies   Allergen Reactions    Adhesive Tape Itching     tegraderm patches causes itching    Methocarbamol Itching and Rash     Arms etc. No hives.       Pregabalin Confusion and Other (See Comments)     Hypnotic driving episode  Dizziness and inability to focus  Dizziness and inability to focus  Other reaction(s): Confusion  lyrica      Silver Rash        Social History     Socioeconomic History    Marital status:    Tobacco Use    Smoking status: Never    Smokeless tobacco: Never   Vaping Use    Vaping Use: Never used   Substance and Sexual Activity    Alcohol use: Yes     Alcohol/week: 4.0 standard drinks     Types: 4 Cans of beer per  "week     Comment: or less per week    Drug use: Never    Sexual activity: Never          Review of Systems   Constitutional:  Positive for activity change.   HENT: Negative.     Eyes: Negative.    Respiratory: Negative.     Cardiovascular: Negative.    Gastrointestinal: Negative.    Endocrine: Negative.    Genitourinary: Negative.    Musculoskeletal:  Positive for arthralgias, myalgias, neck pain and neck stiffness.   Skin: Negative.    Allergic/Immunologic: Negative.    Neurological:  Positive for weakness and numbness (tingling).   Psychiatric/Behavioral:  Positive for sleep disturbance.       Physical Examination:     Vitals:    07/28/23 0845   BP: 121/82   Pulse: 79   Weight: 95.9 kg (211 lb 6.4 oz)   Height: 195.6 cm (77\")   PainSc:   9        Physical Exam  Eyes:      General: Lids are normal.      Extraocular Movements: Extraocular movements intact.      Pupils: Pupils are equal, round, and reactive to light.   Neurological:      Deep Tendon Reflexes:      Reflex Scores:       Tricep reflexes are 0 on the right side and 0 on the left side.       Bicep reflexes are 1+ on the right side and 0 on the left side.       Brachioradialis reflexes are 0 on the right side and 0 on the left side.  Psychiatric:         Speech: Speech normal.        Neurological Exam  Mental Status  Awake, alert and oriented to person, place and time. Speech is normal. Language is fluent with no aphasia. Fund of knowledge is abnormal.    Cranial Nerves  CN II: Visual fields full to confrontation.  CN III, IV, VI: Extraocular movements intact bilaterally. Normal lids and orbits bilaterally. Pupils equal round and reactive to light bilaterally.  CN V: Facial sensation is normal.  CN VII: Full and symmetric facial movement.  CN IX, X: Palate elevates symmetrically  CN XI: Shoulder shrug strength is normal.  CN XII: Tongue midline without atrophy or fasciculations.    Motor  Normal muscle bulk throughout. Fasciculations present: Normal muscle " tone.  Full strength in the right arm.  4 out of 5 strength diffusely in the left arm.  This could easily be pain limited..    Sensory  Left: Loss of sensation in the C6 dermatome.    Reflexes                                            Right                      Left  Brachioradialis                    0                         0  Biceps                                 1+                         0  Triceps                                0                         0    Right pathological reflexes: Silvana's absent.  Left pathological reflexes: Silvana's absent.     Result Review  The following data was reviewed by: Mat Gold MD on 07/28/2023:    Data reviewed : Radiologic studies CT of the cervical spine without contrast shows indications of a prior C5-C6 anterior cervical discectomy and fusion with indication of successful bone fusion.  There is indication of C6-C7 spondylosis with possible disc herniation.       Assessment/plan:  This is a 73-year-old gentleman with an acute left-sided C6 radiculopathy.  He has had no conservative treatment.  I recommend a Medrol Dosepak, gabapentin, Flexeril with hopes that this will improve his symptoms.  I am also can order him a course of physical therapy with hopes that this relieves some of his symptoms.  He will undergo a flexion-extension x-ray of the cervical spine to evaluate for any dynamic spondylolisthesis.  He will return to see me in approximately 6 weeks for clinical reevaluation.  If he is still symptomatic from a possible C6 radiculopathy I will likely recommend a MRI of the cervical spine and possible cervical epidural steroid injection.  I have encouraged him to call with any questions or concerns.    Diagnoses and all orders for this visit:    1. Cervical radiculopathy at C6 (Primary)  -     XR spine cervical ap and lat w flex and ext; Future  -     Ambulatory Referral to Physical Therapy Evaluate and treat    Other orders  -     methylPREDNISolone  (MEDROL) 4 MG dose pack; Take as directed on package instructions.  Dispense: 21 tablet; Refill: 0  -     gabapentin (NEURONTIN) 300 MG capsule; Take 1 capsule by mouth 3 (Three) Times a Day. Take 1 tablet at night for 1 week, then take 2 tablets a day for a week and if tolerating take full dosage  Dispense: 45 capsule; Refill: 0  -     cyclobenzaprine (FLEXERIL) 10 MG tablet; Take 1 tablet by mouth 3 (Three) Times a Day As Needed for Muscle Spasms.  Dispense: 45 tablet; Refill: 0         Return in about 6 weeks (around 9/8/2023).            Mat Gold MD

## 2023-07-28 ENCOUNTER — HOSPITAL ENCOUNTER (OUTPATIENT)
Dept: GENERAL RADIOLOGY | Facility: HOSPITAL | Age: 73
Discharge: HOME | End: 2023-07-28
Admitting: NEUROLOGICAL SURGERY
Payer: MEDICARE

## 2023-07-28 ENCOUNTER — OFFICE VISIT (OUTPATIENT)
Dept: NEUROSURGERY | Facility: CLINIC | Age: 73
End: 2023-07-28
Payer: MEDICARE

## 2023-07-28 VITALS
HEART RATE: 79 BPM | BODY MASS INDEX: 24.96 KG/M2 | HEIGHT: 77 IN | WEIGHT: 211.4 LBS | DIASTOLIC BLOOD PRESSURE: 82 MMHG | SYSTOLIC BLOOD PRESSURE: 121 MMHG

## 2023-07-28 DIAGNOSIS — M54.12 CERVICAL RADICULOPATHY AT C6: ICD-10-CM

## 2023-07-28 DIAGNOSIS — M54.12 CERVICAL RADICULOPATHY AT C6: Primary | ICD-10-CM

## 2023-07-28 PROCEDURE — 72050 X-RAY EXAM NECK SPINE 4/5VWS: CPT

## 2023-07-28 RX ORDER — METHYLPREDNISOLONE 4 MG/1
TABLET ORAL
Qty: 21 TABLET | Refills: 0 | Status: SHIPPED | OUTPATIENT
Start: 2023-07-28

## 2023-07-28 RX ORDER — GABAPENTIN 300 MG/1
300 CAPSULE ORAL 3 TIMES DAILY
Qty: 45 CAPSULE | Refills: 0 | Status: SHIPPED | OUTPATIENT
Start: 2023-07-28

## 2023-07-28 RX ORDER — CYCLOBENZAPRINE HCL 10 MG
10 TABLET ORAL 3 TIMES DAILY PRN
Qty: 45 TABLET | Refills: 0 | Status: SHIPPED | OUTPATIENT
Start: 2023-07-28

## 2023-08-20 NOTE — PROGRESS NOTES
CHIEF COMPLAINT  Neck pain      Subjective   Gurinder Amezcua is a 73 y.o. male who was referred by Marissa Villalba MD to our pain management clinic for consultation, evaluation and treatment of neck pain. Chronic history of neck pain. 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  He was given opioid pain medication without significant pain relief.  Seen by Dr. Gold with neurosurgery on 7/28/2023 patient was given gabapentin, Medrol Dosepak, Flexeril by Dr. Gold.  Flexion-extension cervical spine x-ray along with MRI of cervical spine has been ordered and he was recommended cervical epidural injection. He works at [employer]. His wife has passed away 5 years ago. He lives by himself but his son lives close by- [employer]     Neck pain is 7/10 on VAS, at maximum is 9/10. Pain is sharp in nature. Pain is referred top of left shoulder, 1st and 2nd digit. The pain is intermittent. The pain is improved by norco. The pain is worse with turning his head, looking up.  left sided sharp axial neck pain.     R sided facial pain from chronic trigeminal neuralgia.     PHQ-9- 5   SOAPP- 2      PMH:   Dm-2- uncontrolled (A1c - 10.2-11/2022), DVT, history of MI, CAD s/p stents, craniotomy, trigeminal nerve decompression-7 surgeries as per patient, knee ACL reconstruction, Cervical fusion-C5-C6; lumbar laminectomy and ACDF L2-5-as per patient had 4 surgeries in lumbar spine, knee arthroscopy, TIA, R sided face numbness.     Current Medications:   Norco 7.5-325 mg- 10 tabs in ER  Gabapentin 300 mg TID- made him sleepy  Flexeril 10 mg TID PRN  Aspirin 81 mg      Past Medications:  Robaxin-itching  Pregabalin-confusion    Past Modalities:  TENS:       no          Physical Therapy Within The Last 6 Months     no  Psychotherapy     no  Massage Therapy      no    Patient Complains Of:  Uro-Fecal Incontinence no  Weight  Gain/Loss  no  Fever/Chills   no  Weakness   no      PEG Assessment   What number best describes your pain on average in the past week?7  What number best describes how, during the past week, pain has interfered with your enjoyment of life?7  What number best describes how, during the past week, pain has interfered with your general activity?  7    PHQ-9 Depression Screening  Little interest or pleasure in doing things? 0-->not at all   Feeling down, depressed, or hopeless? 3-->nearly every day   Trouble falling or staying asleep, or sleeping too much? 2-->more than half the days   Feeling tired or having little energy? 0-->not at all   Poor appetite or overeating? 0-->not at all   Feeling bad about yourself - or that you are a failure or have let yourself or your family down? 0-->not at all   Trouble concentrating on things, such as reading the newspaper or watching television? 0-->not at all   Moving or speaking so slowly that other people could have noticed? Or the opposite - being so fidgety or restless that you have been moving around a lot more than usual? 0-->not at all   Thoughts that you would be better off dead, or of hurting yourself in some way? 0-->not at all   PHQ-9 Total Score 5   If you checked off any problems, how difficult have these problems made it for you to do your work, take care of things at home, or get along with other people? not difficult at all         Current Outpatient Medications:     aspirin 81 MG EC tablet, Take 1 tablet by mouth Daily., Disp: , Rfl:     atorvastatin (LIPITOR) 80 MG tablet, Take 1 tablet by mouth Daily., Disp: , Rfl:     B-D UF III MINI PEN NEEDLES 31G X 5 MM misc, TEST EVERY DAY, Disp: 100 each, Rfl: 1    cyclobenzaprine (FLEXERIL) 10 MG tablet, Take 1 tablet by mouth 3 (Three) Times a Day As Needed for Muscle Spasms., Disp: 45 tablet, Rfl: 0    gabapentin (NEURONTIN) 300 MG capsule, Take 1 capsule by mouth 3 (Three) Times a Day. Take 1 tablet at night for 1 week,  then take 2 tablets a day for a week and if tolerating take full dosage, Disp: 45 capsule, Rfl: 0    HYDROcodone-acetaminophen (NORCO) 7.5-325 MG per tablet, Take 1 tablet by mouth Every 4 (Four) Hours As Needed for Moderate Pain., Disp: 10 tablet, Rfl: 0    Lancets (freestyle) lancets, FreeStyle Lancets, Disp: , Rfl:     metFORMIN ER (GLUCOPHAGE-XR) 750 MG 24 hr tablet, Take 2 tabs before breakfast., Disp: 180 tablet, Rfl: 3    methylPREDNISolone (MEDROL) 4 MG dose pack, Take as directed on package instructions., Disp: 21 tablet, Rfl: 0    pantoprazole (PROTONIX) 40 MG EC tablet, Take 1 tablet by mouth Daily., Disp: , Rfl:     Toujeo SoloStar 300 UNIT/ML solution pen-injector injection, Inject 30 Units under the skin into the appropriate area as directed Daily. Inject 25 units QHS, Disp: , Rfl:     Trulicity 0.75 MG/0.5ML solution pen-injector, INJECT 0.75 MG UNDER THE SKIN WEEKLY, Disp: , Rfl:     The following portions of the patient's history were reviewed and updated as appropriate: allergies, current medications, past family history, past medical history, past social history, past surgical history, and problem list.      REVIEW OF PERTINENT MEDICAL DATA    Past Medical History:   Diagnosis Date    Anxiety     Cataracts, bilateral 2022    Deep vein thrombosis  approx    none since    Diabetes mellitus  approx    Type 2    Heart attack 2021    Stroke 2021     Past Surgical History:   Procedure Laterality Date    BACK SURGERY  2019    4 rods in back    CARDIAC CATHETERIZATION  2021    CORONARY STENT PLACEMENT  2021    CRANIOTOMY      ENDOSCOPY      HERNIA REPAIR      KNEE ACL RECONSTRUCTION      KNEE ARTHROSCOPY      LAMINECTOMY      TRIGEMINAL NERVE DECOMPRESSION       Family History   Problem Relation Age of Onset    Diabetes Mother     Heart attack Father             Aneurysm Father      Social History     Socioeconomic History    Marital status:    Tobacco Use     Smoking status: Never    Smokeless tobacco: Never   Vaping Use    Vaping Use: Never used   Substance and Sexual Activity    Alcohol use: Yes     Alcohol/week: 4.0 standard drinks     Types: 4 Cans of beer per week     Comment: or less per week    Drug use: Never    Sexual activity: Never         Review of Systems   Musculoskeletal:  Positive for neck pain and neck stiffness.   Neurological:  Positive for dizziness and headaches.       Vitals:    08/21/23 1407   BP: 134/84   Pulse: 114   Resp: 16   SpO2: 94%   PainSc:   7         Objective   Physical Exam  Neck:     Musculoskeletal:         General: Tenderness present.        Arms:    Neurological:      Deep Tendon Reflexes:      Reflex Scores:       Tricep reflexes are 2+ on the right side and 2+ on the left side.       Bicep reflexes are 2+ on the right side and 2+ on the left side.       Brachioradialis reflexes are 2+ on the right side and 2+ on the left side.     Comments: Motor strength 5/5 b/l UE  Sensory intact b/l UE             Imaging Reviewed:  CT cervical spine without contrast-7/23/2023   - Postsurgical changes with osseous disc fusion at C5-C6  - Multilevel degenerative changes-mild to moderate  - Multilevel facet disease present with mild to moderate neuroforaminal stenosis bilaterally most pronounced at C6-7    CT lumbar spine without contrast-2016  - L2-5 surgical fusion  - Mature anterior osseous fusion at L2-3, L3-4  - L1-2-moderate central stenosis; moderate facet arthropathy.  L2-3-moderate left foraminal stenosis and left facet arthropathy.  L4-5-severe degenerative facet arthropathy.  Left facet partial fusion.  Moderate left foraminal stenosis related to spondylosis.  S/p midline laminectomy.  - L5-S1-disc bulge with endplate hypertrophic changes.  Severe facet arthropathy.  S/p laminectomy.  Severe bilateral foraminal stenosis.    Assessment:    1. DDD (degenerative disc disease), cervical    2. Cervical spondylosis         Plan:   1.   Defer UDS for now.  2. We discussed trying a course of formal physical therapy.  Physical therapy can help strengthen and stretch the muscles around the joints.  Patient will be starting therapy starting on 8/28/2023.  3. Patient has pain in the neck with radicular pain in the arm, patient has failed conservative therapy including PT and pharmacological management for more than 6 weeks and pain interferes with activities of daily living. Spurling's test is positive.  CT cervical spine shows moderate neuroforaminal stenosis at C6-7.  Discussed cervical CAMELIA C7-T1. Discussed the possibility of infection, bleeding, nerve damage, post dural puncture headache, increased pain, paraplegia. Patient understands and agrees.     RTC for injection and then 3 week follow up.     Jaison Dickson DO  Pain Management   [facility]         INSPECT REPORT    As part of the patient's treatment plan, I may be prescribing controlled substances. The patient has been made aware of appropriate use of such medications, including potential risk of somnolence, limited ability to drive and/or work safely, and the potential for dependence or overdose. It has also been made clear that these medications are for use by this patient only, without concomitant use of alcohol or other substances unless prescribed.     Patient has completed prescribing agreement detailing terms of continued prescribing of controlled substances, including monitoring INSPECT reports, urine drug screening, and pill counts if necessary. The patient is aware that inappropriate use will results in cessation of prescribing such medications.    INSPECT report has been reviewed and scanned into the patient's chart.      EMR Dragon/Transcription Disclaimer:   Much of this encounter note is an electronic transcription/translation of spoken language to printed text. The electronic translation of spoken language may permit erroneous, or at times, nonsensical words or phrases to be  inadvertently transcribed; Although I have reviewed the note for such errors, some may still exist.

## 2023-08-21 ENCOUNTER — OFFICE VISIT (OUTPATIENT)
Dept: PAIN MEDICINE | Facility: CLINIC | Age: 73
End: 2023-08-21
Payer: MEDICARE

## 2023-08-21 VITALS
DIASTOLIC BLOOD PRESSURE: 84 MMHG | HEART RATE: 114 BPM | OXYGEN SATURATION: 94 % | RESPIRATION RATE: 16 BRPM | SYSTOLIC BLOOD PRESSURE: 134 MMHG

## 2023-08-21 DIAGNOSIS — M47.812 CERVICAL SPONDYLOSIS: ICD-10-CM

## 2023-08-21 DIAGNOSIS — M50.30 DDD (DEGENERATIVE DISC DISEASE), CERVICAL: Primary | ICD-10-CM

## 2023-08-21 PROCEDURE — 1159F MED LIST DOCD IN RCRD: CPT | Performed by: STUDENT IN AN ORGANIZED HEALTH CARE EDUCATION/TRAINING PROGRAM

## 2023-08-21 PROCEDURE — 99204 OFFICE O/P NEW MOD 45 MIN: CPT | Performed by: STUDENT IN AN ORGANIZED HEALTH CARE EDUCATION/TRAINING PROGRAM

## 2023-08-21 PROCEDURE — 1125F AMNT PAIN NOTED PAIN PRSNT: CPT | Performed by: STUDENT IN AN ORGANIZED HEALTH CARE EDUCATION/TRAINING PROGRAM

## 2023-08-21 PROCEDURE — 1160F RVW MEDS BY RX/DR IN RCRD: CPT | Performed by: STUDENT IN AN ORGANIZED HEALTH CARE EDUCATION/TRAINING PROGRAM

## 2023-08-28 ENCOUNTER — TREATMENT (OUTPATIENT)
Dept: PHYSICAL THERAPY | Facility: CLINIC | Age: 73
End: 2023-08-28
Payer: MEDICARE

## 2023-08-28 DIAGNOSIS — M54.2 PAIN, NECK: Primary | ICD-10-CM

## 2023-08-28 DIAGNOSIS — M54.12 CERVICAL RADICULOPATHY: ICD-10-CM

## 2023-08-28 DIAGNOSIS — Z78.9 IMPAIRED MOBILITY AND ADLS: ICD-10-CM

## 2023-08-28 DIAGNOSIS — Z74.09 IMPAIRED MOBILITY AND ADLS: ICD-10-CM

## 2023-08-28 NOTE — PROGRESS NOTES
Physical Therapy Initial Evaluation and Plan of Care     [address] [place]  [address]    Patient: Gurinder Amezcua   : 1950  Diagnosis/ICD-10 Code:  Cervical radiculopathy [M54.12]  Referring practitioner: Mat Gold MD  Date of Initial Visit: 2023  Today's Date: 2023  Patient seen for 1 sessions           Subjective Questionnaire: NDI:66%      Subjective Evaluation    History of Present Illness  Mechanism of injury: Pt reports he has had neck pain with left UE into index finger for over 2 months.  He has been working on his land and has injured his neck. He has pain on the left side and into his upper arm and then in the index finger and thumb.  His neck pops and cracks with movement.    He has difficulty with looking up/down/right and left for driving; shaving and bathing due to looking up and down;  sleeping due to pain       Patient Occupation:  at [employer] just started night shift Pain  Current pain ratin  At best pain ratin  At worst pain ratin  Quality: radiating, sharp and dull ache  Relieving factors: rest, support and medications  Aggravating factors: sleeping, overhead activity and prolonged positioning  Progression: worsening    Social Support  Lives in: one-story house  Lives with: alone    Hand dominance: right    Diagnostic Tests  MRI studies: abnormal    Treatments  Current treatment: medication  Patient Goals  Patient goals for therapy: decreased pain, increased motion, independence with ADLs/IADLs, return to sport/leisure activities and return to work       PRECAUTIONS: 4 rods in lumbar spine;  C5/6 fusion in the 's; trigeminal neuralgia on the R side of the head with numbness; 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     Additional Special Questions  Hearing loss      Postural Observations  Seated  posture: fair  Standing posture: fair  Correction of posture: has no consistent effect      Tenderness     Additional Tenderness Details  TTP:  left paraspinals; UT/levator; tingling/pain in left thumb and index finger but no tenderness    Neurological Testing     Sensation   Cervical/Thoracic   Left   Diminished: light touch    Right   Diminished: light touch    Reflexes   Left   Biceps (C5/C6): absent (0)  Brachioradialis (C6): trace (1+)    Right   Biceps (C5/C6): absent (0)  Brachioradialis (C6): brisk (3+)    Active Range of Motion     Additional Active Range of Motion Details  Severe limit with pain for flex, ext, left rotation  Mod limit with pain for right rotation    Strength/Myotome Testing     Left Shoulder     Planes of Motion   Flexion: 4-   Extension: 4   Abduction: 4-   External rotation at 0ø: 4   Internal rotation at 0ø: 4     Right Shoulder     Planes of Motion   Flexion: 4   Extension: 4+   Abduction: 4   External rotation at 0ø: 4   Internal rotation at 0ø: 4     Left Elbow   Flexion: 4-  Extension: 4-    Right Elbow   Flexion: 4  Extension: 4    Left Wrist/Hand   Wrist extension: 4  Wrist flexion: 4    Right Wrist/Hand   Wrist extension: 4  Wrist flexion: 4    Additional Strength Details   at level 2:  right 80#;  L 60#    Tests   Cervical     Left   Positive Spurling's sign.     Right   Positive Spurling's sign.     Additional Tests Details  Man traction pos for inc pain  Compression neg with no change in pain    Cerv retraction:  sitting:  pain in center on spine and slightly to the right;  x 10:  after:  no change  R cerv rotation:  x 10:          Assessment & Plan       Assessment  Impairments: abnormal muscle tone, abnormal or restricted ROM, activity intolerance, impaired physical strength, lacks appropriate home exercise program and pain with function   Functional limitations: carrying objects, lifting, sleeping, pulling, pushing, uncomfortable because of pain, moving in bed, reaching  behind back, reaching overhead and unable to perform repetitive tasks   Assessment details: Pt is a 72 y/o male with a dx of cervical radiculopathy onset 2 months ago.  He has h/o C5-6 fusion in the 90's and now has issues with C6-7 with pain in the left neck and arm.    He has difficulty with looking up/down/right and left for driving; shaving and bathing due to looking up and down;  sleeping due to pain    Pt exhibits the above impairments and functional limitations and would benefit from skilled PT to address those areas and maximize function.   Prognosis: good    Goals  Plan Goals: STGs:  6 weeks  1.  Pt to tolerate initial HEP without inc pain.  2.  Pt to tolerate advancement of HEP without inc pain.  3.  Pt to report neck pain has decreased by at least 25%.  4.  Pt to improve  AROM of cervical left rotation, flex and ext  to at least moderate limitation.  5.  Pt to sit with proper posture without cues required.      LTGs:  12 weeks  1.  Pt to be (I) with HEP to manage own condition.  2.  Pt to report neck and left UE pain has decreased by at least 50% to allow better tolerance to and performance of sleeping well at night.  3.  Pt to improve AROM of cervical flex, ext and left rotation to min limit to allow pt to drive without difficulty.  4.  Pt to improve strength of left shoulder and elbow  to 4 to 4+/5 or better to be adequate for completion/performance of shave and bathe without difficulty.  5.  Pt to exhibit improved function as indicated by improved score on NDI vs score at evaluation.    6.  Pt to demonstrate (I) and appropriate use of body mechanics for daily home and/or work tasks.     Plan  Therapy options: will be seen for skilled therapy services  Planned modality interventions: cryotherapy, thermotherapy (hydrocollator packs) and high voltage pulsed current (pain management)  Planned therapy interventions: abdominal trunk stabilization, body mechanics training, functional ROM exercises, home  exercise program, ADL retraining, manual therapy, motor coordination training, neuromuscular re-education, postural training, soft tissue mobilization, spinal/joint mobilization, strengthening, stretching and therapeutic activities  Frequency: 2x week  Treatment plan discussed with: patient  Plan details: 12 weeks    See flowsheet for treatment details.    History # of Personal Factors and/or Comorbidities: MODERATE (1-2)  Examination of Body System(s): # of elements: MODERATE (3)  Clinical Presentation: EVOLVING  Clinical Decision Making: MODERATE    Timed:         Manual Therapy:         mins  14979;     Therapeutic Exercise:  15       mins  87230;     Neuromuscular Carmen:        mins  69447;    Therapeutic Activity:          mins  67069;     Gait Training:           mins  59607;     Ultrasound:          mins  38943;    Ionto:                                   mins   62330  Self Care:                     15       mins   58849    Un-Timed:  Electrical Stimulation:         mins  93364 ( );  Traction          mins 85046  Canalith Repos.          ___  mins  88164  Low Eval          Mins  50167  Mod Eval  35        Mins  07510  High Eval                            Mins  75164  Re-Eval                               mins  82096    Timed Treatment:  30    mins   Total Treatment:   65     mins    PT SIGNATURE: Komal Medeiros PT   [place] PT Lic. # [id number]    DATE TREATMENT INITIATED: 8/28/2023    Initial Certification  Certification Period: 11/25/2023  I certify that the therapy services are furnished while this patient is under my care.  The services outlined above are required by this patient, and will be reviewed every 90 days.     PHYSICIAN: Mat Gold MD  NPI: [id number]                                       DATE:     Please sign and return via fax to [phone].. Thank you, [facility].

## 2023-08-30 ENCOUNTER — OFFICE VISIT (OUTPATIENT)
Dept: CARDIOLOGY | Facility: CLINIC | Age: 73
End: 2023-08-30
Payer: MEDICARE

## 2023-08-30 VITALS
OXYGEN SATURATION: 98 % | HEART RATE: 89 BPM | SYSTOLIC BLOOD PRESSURE: 115 MMHG | DIASTOLIC BLOOD PRESSURE: 74 MMHG | BODY MASS INDEX: 24.79 KG/M2 | WEIGHT: 210 LBS | HEIGHT: 77 IN

## 2023-08-30 DIAGNOSIS — E78.00 PURE HYPERCHOLESTEROLEMIA: ICD-10-CM

## 2023-08-30 DIAGNOSIS — Z79.4 TYPE 2 DIABETES MELLITUS WITH DIABETIC POLYNEUROPATHY, WITH LONG-TERM CURRENT USE OF INSULIN: ICD-10-CM

## 2023-08-30 DIAGNOSIS — I10 PRIMARY HYPERTENSION: ICD-10-CM

## 2023-08-30 DIAGNOSIS — I25.10 CORONARY ARTERY DISEASE INVOLVING NATIVE CORONARY ARTERY OF NATIVE HEART WITHOUT ANGINA PECTORIS: Primary | ICD-10-CM

## 2023-08-30 DIAGNOSIS — E11.42 TYPE 2 DIABETES MELLITUS WITH DIABETIC POLYNEUROPATHY, WITH LONG-TERM CURRENT USE OF INSULIN: ICD-10-CM

## 2023-08-30 PROCEDURE — 1160F RVW MEDS BY RX/DR IN RCRD: CPT | Performed by: INTERNAL MEDICINE

## 2023-08-30 PROCEDURE — 99214 OFFICE O/P EST MOD 30 MIN: CPT | Performed by: INTERNAL MEDICINE

## 2023-08-30 PROCEDURE — 1159F MED LIST DOCD IN RCRD: CPT | Performed by: INTERNAL MEDICINE

## 2023-08-30 NOTE — PROGRESS NOTES
"    Subjective:     Encounter Date:2023      Patient ID: Gurinder Amezcua is a 73 y.o. male.    Chief Complaint:  Coronary Artery Disease  Pertinent negatives include no chest pain, dizziness, leg swelling, palpitations or shortness of breath.     Heart-year-old white male with history of coronary disease diabetes hypertension hyperlipidemia presents to my office for a follow-up.  Patient is currently stable without any signs of chest pain or shortness of breath at rest or exertion radiograms any PND orthopnea.  No palpitation dizziness syncope or swelling of the feet.  Patient is taking all her medicines regular.  Patient does not smoke.    The following portions of the patient's history were reviewed and updated as appropriate: allergies, current medications, past family history, past medical history, past social history, past surgical history, and problem list.  Past Medical History:   Diagnosis Date    Anxiety     Cataracts, bilateral 2022    Deep vein thrombosis  approx    none since    Diabetes mellitus  approx    Type 2    Heart attack 2021    Stroke 2021     Past Surgical History:   Procedure Laterality Date    BACK SURGERY      4 rods in back    CARDIAC CATHETERIZATION  2021    CORONARY STENT PLACEMENT  2021    CRANIOTOMY      ENDOSCOPY      HERNIA REPAIR      KNEE ACL RECONSTRUCTION      KNEE ARTHROSCOPY      LAMINECTOMY      TRIGEMINAL NERVE DECOMPRESSION       /74   Pulse 89   Ht 195.4 cm (76.93\")   Wt 95.3 kg (210 lb)   SpO2 98%   BMI 24.95 kg/mý   Family History   Problem Relation Age of Onset    Diabetes Mother     Heart attack Father             Aneurysm Father        Current Outpatient Medications:     aspirin 81 MG EC tablet, Take 1 tablet by mouth Daily., Disp: , Rfl:     atorvastatin (LIPITOR) 80 MG tablet, Take 1 tablet by mouth Daily., Disp: , Rfl:     B-D UF III MINI PEN NEEDLES 31G X 5 MM misc, TEST EVERY DAY, Disp: 100 each, " Rfl: 1    cyclobenzaprine (FLEXERIL) 10 MG tablet, Take 1 tablet by mouth 3 (Three) Times a Day As Needed for Muscle Spasms., Disp: 45 tablet, Rfl: 0    gabapentin (NEURONTIN) 300 MG capsule, Take 1 capsule by mouth 3 (Three) Times a Day. Take 1 tablet at night for 1 week, then take 2 tablets a day for a week and if tolerating take full dosage, Disp: 45 capsule, Rfl: 0    HYDROcodone-acetaminophen (NORCO) 7.5-325 MG per tablet, Take 1 tablet by mouth Every 4 (Four) Hours As Needed for Moderate Pain., Disp: 10 tablet, Rfl: 0    Lancets (freestyle) lancets, FreeStyle Lancets, Disp: , Rfl:     metFORMIN ER (GLUCOPHAGE-XR) 750 MG 24 hr tablet, Take 2 tabs before breakfast., Disp: 180 tablet, Rfl: 3    methylPREDNISolone (MEDROL) 4 MG dose pack, Take as directed on package instructions., Disp: 21 tablet, Rfl: 0    pantoprazole (PROTONIX) 40 MG EC tablet, Take 1 tablet by mouth Daily., Disp: , Rfl:     Toujeo SoloStar 300 UNIT/ML solution pen-injector injection, Inject 30 Units under the skin into the appropriate area as directed Daily. Inject 25 units QHS, Disp: , Rfl:     Trulicity 0.75 MG/0.5ML solution pen-injector, INJECT 0.75 MG UNDER THE SKIN WEEKLY, Disp: , Rfl:   Allergies   Allergen Reactions    Adhesive Tape Itching     tegraderm patches causes itching    Methocarbamol Itching and Rash     Arms etc. No hives.       Pregabalin Confusion and Other (See Comments)     Hypnotic driving episode  Dizziness and inability to focus  Dizziness and inability to focus  Other reaction(s): Confusion  lyrica      Silver Rash     Social History     Socioeconomic History    Marital status:    Tobacco Use    Smoking status: Never    Smokeless tobacco: Never   Vaping Use    Vaping Use: Never used   Substance and Sexual Activity    Alcohol use: Yes     Alcohol/week: 4.0 standard drinks     Types: 4 Cans of beer per week     Comment: or less per week    Drug use: Never    Sexual activity: Never     Review of Systems    Constitutional: Negative for malaise/fatigue.   Cardiovascular:  Negative for chest pain, dyspnea on exertion, leg swelling and palpitations.   Respiratory:  Negative for cough and shortness of breath.    Gastrointestinal:  Negative for abdominal pain, nausea and vomiting.   Neurological:  Negative for dizziness, focal weakness, headaches, light-headedness and numbness.   All other systems reviewed and are negative.           Objective:     Constitutional:       Appearance: Well-developed.   Eyes:      General: No scleral icterus.     Conjunctiva/sclera: Conjunctivae normal.   HENT:      Head: Normocephalic and atraumatic.   Neck:      Vascular: No carotid bruit or JVD.   Pulmonary:      Effort: Pulmonary effort is normal.      Breath sounds: Normal breath sounds. No wheezing. No rales.   Cardiovascular:      Normal rate. Regular rhythm.   Pulses:     Intact distal pulses.   Abdominal:      General: Bowel sounds are normal.      Palpations: Abdomen is soft.   Musculoskeletal:      Cervical back: Normal range of motion and neck supple. Skin:     General: Skin is warm and dry.      Findings: No rash.   Neurological:      Mental Status: Alert.     Procedures    Lab Review:         MDM    #1 coronary disease  Patient had a stent placement to the LAD and is currently stable on medication with normal function  2.  Hypertension  Patient blood pressure currently stable on medications  3.  Hyperlipidemia  Patient is on atorvastatin the lipid levels are well within normal limits  4.  Diabetes  Patient is currently on multiple medications and followed by the primary care doctor.    Patient's previous medical records, labs, and EKG were reviewed and discussed with the patient at today's visit.

## 2023-09-06 ENCOUNTER — TREATMENT (OUTPATIENT)
Dept: PHYSICAL THERAPY | Facility: CLINIC | Age: 73
End: 2023-09-06
Payer: MEDICARE

## 2023-09-06 DIAGNOSIS — Z78.9 IMPAIRED MOBILITY AND ADLS: ICD-10-CM

## 2023-09-06 DIAGNOSIS — M54.12 CERVICAL RADICULOPATHY: Primary | ICD-10-CM

## 2023-09-06 DIAGNOSIS — Z74.09 IMPAIRED MOBILITY AND ADLS: ICD-10-CM

## 2023-09-06 DIAGNOSIS — M54.2 PAIN, NECK: ICD-10-CM

## 2023-09-06 NOTE — PROGRESS NOTES
Physical Therapy Daily Treatment Note    Patient: Gurinder Amezcua   : 1950  Referring practitioner: Mat Gold MD  Diagnoses: Cervical radiculopathy [M54.12]  Today's Date: 2023    VISIT#: 2    Subjective Evaluation    History of Present Illness  Mechanism of injury: Pt reports he has been feeling ok.  He was sore after the evaluation.  He has been doing the exercises at home    Pain  Current pain ratin  Location: left neck and arm to finger       Objective     See Exercise, Manual, and Modality Logs for complete treatment.     Cont with ex and initiated man therapy today    Patient Education: cont to work on posture    Assessment & Plan       Assessment  Assessment details: Sindy well today with initiating man therapy.        Progress per Plan of Care        Timed:         Manual Therapy:  10       mins  09483;     Therapeutic Exercise:   15      mins  96855;     Neuromuscular Carmen:        mins  06325;    Therapeutic Activity:          mins  76520;     Gait Training:           mins  98996;     Ultrasound:          mins  23470;    Ionto                                   mins   20759  Self Care                            mins   78331      Un-Timed:  Electrical Stimulation:         mins  99344 ( );  Traction          mins 01029  Canalith Repos                   mins  26633  Low Eval          Mins  84932  Mod Eval          Mins  57986  High Eval                            Mins  61816  Re-Eval                               mins  16894    Timed Treatment:  25    mins   Total Treatment:    25    mins    Komal Medeiros PT  Physical Therapist  [place] Lic. # [id number]

## 2023-09-07 ENCOUNTER — TELEPHONE (OUTPATIENT)
Dept: NEUROSURGERY | Facility: CLINIC | Age: 73
End: 2023-09-07
Payer: MEDICARE

## 2023-09-07 PROBLEM — H91.90 HEARING LOSS: Status: ACTIVE | Noted: 2023-09-07

## 2023-09-07 PROBLEM — E55.9 VITAMIN D DEFICIENCY: Status: ACTIVE | Noted: 2019-02-18

## 2023-09-07 PROBLEM — M19.90 ARTHRITIS: Status: ACTIVE | Noted: 2023-09-07

## 2023-09-07 PROBLEM — N20.0 NEPHROLITHIASIS: Status: ACTIVE | Noted: 2019-08-02

## 2023-09-07 PROBLEM — E78.2 MIXED HYPERLIPIDEMIA: Status: ACTIVE | Noted: 2018-03-08

## 2023-09-07 PROBLEM — K22.2 ESOPHAGEAL OBSTRUCTION: Status: ACTIVE | Noted: 2023-05-11

## 2023-09-07 PROBLEM — K44.9 DIAPHRAGMATIC HERNIA WITHOUT OBSTRUCTION OR GANGRENE: Status: ACTIVE | Noted: 2023-05-11

## 2023-09-07 PROBLEM — K31.7 POLYP OF STOMACH AND DUODENUM: Status: ACTIVE | Noted: 2023-05-11

## 2023-09-07 PROBLEM — R68.84 MAXILLARY PAIN: Status: ACTIVE | Noted: 2018-02-05

## 2023-09-07 PROBLEM — F32.9 MAJOR DEPRESSIVE DISORDER, SINGLE EPISODE, UNSPECIFIED: Status: ACTIVE | Noted: 2023-09-07

## 2023-09-07 PROBLEM — R53.82 CHRONIC FATIGUE: Status: ACTIVE | Noted: 2018-07-05

## 2023-09-07 PROBLEM — Z12.11 ENCOUNTER FOR SCREENING COLONOSCOPY: Status: ACTIVE | Noted: 2018-01-02

## 2023-09-07 PROBLEM — I63.9 CEREBRAL INFARCTION, UNSPECIFIED: Status: ACTIVE | Noted: 2023-09-07

## 2023-09-07 PROBLEM — R13.10 DYSPHAGIA: Status: ACTIVE | Noted: 2023-09-07

## 2023-09-07 PROBLEM — K21.9 GASTRO-ESOPHAGEAL REFLUX DISEASE WITHOUT ESOPHAGITIS: Status: ACTIVE | Noted: 2023-09-07

## 2023-09-07 PROBLEM — T18.2XXA GASTRIC FOREIGN BODY: Status: ACTIVE | Noted: 2023-05-11

## 2023-09-07 PROBLEM — K20.80 OTHER ESOPHAGITIS WITHOUT BLEEDING: Status: ACTIVE | Noted: 2023-05-11

## 2023-09-07 NOTE — TELEPHONE ENCOUNTER
Called patient and left a VM to return a call to the office.     I seen where patient has completed just 2 pt visits. Wanting to know if patient has same or worsening pain, and if he would like to complete 4-6 more PT visits prior to seeing Dr. Gold.       OK for HUB

## 2023-09-12 ENCOUNTER — HOSPITAL ENCOUNTER (OUTPATIENT)
Dept: PAIN MEDICINE | Facility: HOSPITAL | Age: 73
Discharge: HOME | End: 2023-09-12
Payer: MEDICARE

## 2023-09-12 VITALS
OXYGEN SATURATION: 97 % | DIASTOLIC BLOOD PRESSURE: 63 MMHG | HEART RATE: 84 BPM | SYSTOLIC BLOOD PRESSURE: 104 MMHG | TEMPERATURE: 97.5 F | BODY MASS INDEX: 24.79 KG/M2 | HEIGHT: 77 IN | RESPIRATION RATE: 16 BRPM | WEIGHT: 210 LBS

## 2023-09-12 DIAGNOSIS — R52 PAIN: ICD-10-CM

## 2023-09-12 DIAGNOSIS — M50.30 DDD (DEGENERATIVE DISC DISEASE), CERVICAL: Primary | ICD-10-CM

## 2023-09-12 PROCEDURE — 77003 FLUOROGUIDE FOR SPINE INJECT: CPT

## 2023-09-12 PROCEDURE — 25010000002 DEXAMETHASONE SODIUM PHOSPHATE 10 MG/ML SOLUTION: Performed by: STUDENT IN AN ORGANIZED HEALTH CARE EDUCATION/TRAINING PROGRAM

## 2023-09-12 PROCEDURE — 25510000001 IOPAMIDOL 41 % SOLUTION: Performed by: STUDENT IN AN ORGANIZED HEALTH CARE EDUCATION/TRAINING PROGRAM

## 2023-09-12 RX ORDER — DEXAMETHASONE SODIUM PHOSPHATE 10 MG/ML
10 INJECTION, SOLUTION INTRAMUSCULAR; INTRAVENOUS ONCE
Status: COMPLETED | OUTPATIENT
Start: 2023-09-12 | End: 2023-09-12

## 2023-09-12 RX ADMIN — DEXAMETHASONE SODIUM PHOSPHATE 10 MG: 10 INJECTION, SOLUTION INTRAMUSCULAR; INTRAVENOUS at 14:13

## 2023-09-12 RX ADMIN — IOPAMIDOL 1 ML: 408 INJECTION, SOLUTION INTRATHECAL at 14:13

## 2023-09-12 NOTE — PROCEDURES
PREOPERATIVE DIAGNOSIS:    1. Cervical DDD    POSTOPERATIVE DIAGNOSIS:  Same.    PROCEDURE PERFORMED: Cervical CAMELIA C 7-T1     PROCEDURE IN DETAIL:    Written informed consent was taken from the patient. Pre-procedure blood pressure and pulse were stable and recorded in patients clinic chart. The patient was brought to the procedure room and placed in the prone position on fluoroscopy table. The patient’s neck was prepped with chloraprep and draped in the usual sterile fashion.   The skin over the C 7-T1 space was identified under fluoroscopic guidance and infiltrated with 1% lidocaine for local anesthesia via 25 gauge needle.  A 20 gauge Tuohy needle was inserted through the skin under fluoroscopic guidance until we got to the epidural space with loss of resistance technique.  Negative for CSF and heme.  2 ml of omnipaque contrast was injected under continuous fluoroscopic guidance and good spread was noted from C5-7 space bilaterally. 10 mg of dexamethasone mixed with 4 ml of preservative free normal saline was injected with minimal pressure. The needle was cleared with preservative free normal saline and removed. Band aid was applied.  Following the procedure the patient's vital signs were stable. The patient was discharged home in good condition after being given discharge instructions.    COMPLICATIONS: None    Jaison Dickson DO  Pain Management   [facility]

## 2023-09-12 NOTE — DISCHARGE INSTRUCTIONS

## 2023-09-12 NOTE — H&P
H and P reviewed from previous visit and no changes to patient's clinical presentation. Will proceed with procedure as planned. Patient denies history of  being on blood thinners. Hx of DM-2. Glucose today <180.    Jaison Dickson DO  Pain Management   [facility]

## 2023-09-13 ENCOUNTER — TREATMENT (OUTPATIENT)
Dept: PHYSICAL THERAPY | Facility: CLINIC | Age: 73
End: 2023-09-13
Payer: MEDICARE

## 2023-09-13 ENCOUNTER — TELEPHONE (OUTPATIENT)
Dept: PAIN MEDICINE | Facility: HOSPITAL | Age: 73
End: 2023-09-13
Payer: MEDICARE

## 2023-09-13 DIAGNOSIS — M54.2 PAIN, NECK: ICD-10-CM

## 2023-09-13 DIAGNOSIS — Z78.9 IMPAIRED MOBILITY AND ADLS: ICD-10-CM

## 2023-09-13 DIAGNOSIS — M54.12 CERVICAL RADICULOPATHY: Primary | ICD-10-CM

## 2023-09-13 DIAGNOSIS — Z74.09 IMPAIRED MOBILITY AND ADLS: ICD-10-CM

## 2023-09-13 NOTE — PROGRESS NOTES
Physical Therapy Daily Treatment Note    Patient: Gurinder Amezcua   : 1950  Referring practitioner: Mat Gold MD  Diagnoses: Cervical radiculopathy [M54.12]  Today's Date: 2023    VISIT#: 3    Subjective Evaluation    History of Present Illness  Mechanism of injury: Pt reports he had his cervical epidural yesterday early afternoon.  He quit his job last week due to not being paid for 3 weeks.  The  told him to talk to HR and HR told him the  was supposed to take care of it.  He talked to others that work there and according to them that is the norm.      Pain  Current pain ratin  Location: feels nothing right now       Objective     See Exercise, Manual, and Modality Logs for complete treatment.     Cont with ex today;  instr pt to be cautious so he does not hurt his neck and not realize it until later  HOLD man therapy due to still being numb after the epidural    Patient Education:  once the numbness wears off try to do the regular HEP and monitor the effect    Assessment & Plan       Assessment  Assessment details: Sindy fair today with residual numbness from the injection yesterday.          Progress per Plan of Care        Timed:         Manual Therapy:         mins  38854;     Therapeutic Exercise:  15       mins  85711;     Neuromuscular Carmen:        mins  92833;    Therapeutic Activity:          mins  06443;     Gait Training:           mins  11796;     Ultrasound:          mins  65076;    Ionto                                   mins   56419  Self Care                     8       mins   60769      Un-Timed:  Electrical Stimulation:         mins  69929 ( );  Traction          mins 61531  Canalith Repos                   mins  15205  Low Eval          Mins  52015  Mod Eval          Mins  09992  High Eval                            Mins  42879  Re-Eval                               mins  13800    Timed Treatment:  23    mins   Total Treatment:    23     marky Medeiros, PT  Physical Therapist  [place] Lic. # [id number]

## 2023-09-13 NOTE — TELEPHONE ENCOUNTER
Post procedure phone call completed.  Pt states they are doing good and denies questions or concerns.  Patient reports pain level a #0.

## 2023-09-20 ENCOUNTER — TREATMENT (OUTPATIENT)
Dept: PHYSICAL THERAPY | Facility: CLINIC | Age: 73
End: 2023-09-20
Payer: MEDICARE

## 2023-09-20 DIAGNOSIS — Z78.9 IMPAIRED MOBILITY AND ADLS: ICD-10-CM

## 2023-09-20 DIAGNOSIS — M54.12 CERVICAL RADICULOPATHY: Primary | ICD-10-CM

## 2023-09-20 DIAGNOSIS — M54.2 PAIN, NECK: ICD-10-CM

## 2023-09-20 DIAGNOSIS — Z74.09 IMPAIRED MOBILITY AND ADLS: ICD-10-CM

## 2023-09-20 NOTE — PROGRESS NOTES
Physical Therapy Daily Treatment Note    Patient: Gurinder Amezcua   : 1950  Referring practitioner: Mat Gold MD  Diagnoses: Cervical radiculopathy [M54.12]  Today's Date: 2023    VISIT#: 4    Subjective Evaluation    History of Present Illness  Mechanism of injury: The injection comfort wore off about mid week and pain came back gradually.  If the pain did not get any worse than it is right now, he could live with that.    Pain  Current pain ratin       Objective     See Exercise, Manual, and Modality Logs for complete treatment.     Cont with man and ex as noted;  added to program today without inc pain    Assessment & Plan       Assessment  Assessment details: Sindy  better today vs last session now that the injection has worn off.      Progress per Plan of Care        Timed:         Manual Therapy:  10       mins  85006;     Therapeutic Exercise:  20       mins  54787;     Neuromuscular Carmen:        mins  00949;    Therapeutic Activity:          mins  09836;     Gait Training:           mins  75481;     Ultrasound:          mins  98940;    Ionto                                   mins   69776  Self Care                            mins   31372      Un-Timed:  Electrical Stimulation:         mins  01756 ( );  Traction          mins 78228  Canalith Repos                   mins  23130  Low Eval          Mins  64908  Mod Eval          Mins  33872  High Eval                            Mins  60069  Re-Eval                               mins  05271    Timed Treatment:  30    mins   Total Treatment:   30     mins    Komal Medeiros PT  Physical Therapist  [place] Lic. # [id number]

## 2023-09-27 ENCOUNTER — TREATMENT (OUTPATIENT)
Dept: PHYSICAL THERAPY | Facility: CLINIC | Age: 73
End: 2023-09-27
Payer: MEDICARE

## 2023-09-27 DIAGNOSIS — Z74.09 IMPAIRED MOBILITY AND ADLS: ICD-10-CM

## 2023-09-27 DIAGNOSIS — Z78.9 IMPAIRED MOBILITY AND ADLS: ICD-10-CM

## 2023-09-27 DIAGNOSIS — M54.2 PAIN, NECK: ICD-10-CM

## 2023-09-27 DIAGNOSIS — M54.12 CERVICAL RADICULOPATHY: Primary | ICD-10-CM

## 2023-09-27 NOTE — PROGRESS NOTES
Physical Therapy Daily Treatment Note    Patient: Gurinder Amezcua   : 1950  Referring practitioner: Mat Gold MD  Diagnoses: Cervical radiculopathy [M54.12]  Today's Date: 2023    VISIT#: 5    Subjective Evaluation    History of Present Illness  Mechanism of injury: Pt reports he has been doing ok.  His alarm did not go off this am so he had to come at at later time.  He rates improvement overall at 30-40% but he is not sure if it is due to PT or the injection or both.    Pain  Current pain ratin       Objective     See Exercise, Manual, and Modality Logs for complete treatment.     Cont with man and ex as noted; pt required cues for all ex today    Patient Education:  cont with HEP as instructed    Assessment & Plan       Assessment  Assessment details: Sindy well today; pt is frustrated with his situation with his vision in addition to the neck issues      Progress per Plan of Care      Timed:         Manual Therapy: 10        mins  68873;     Therapeutic Exercise:  20       mins  91308;     Neuromuscular Carmen:        mins  27765;    Therapeutic Activity:          mins  94222;     Gait Training:           mins  95341;     Ultrasound:          mins  54346;    Ionto                                   mins   82632  Self Care                            mins   64877    Un-Timed:  Electrical Stimulation:         mins  18238 ( );  Traction          mins 08601  Canalith Repos                   mins  41586  Low Eval          Mins  15339  Mod Eval          Mins  03308  High Eval                            Mins  70503  Re-Eval                               mins  48744    Timed Treatment: 30     mins   Total Treatment:    30    mins    Komal Medeiros PT  Physical Therapist  [place] Lic. # [id number]

## 2023-09-28 NOTE — PROGRESS NOTES
Subjective   Gurinder Amezcua is a 73 y.o. male is here for follow up for neck pain. Patient was last seen for TATIANA. His main complaint is sharp axial neck pain in his neck on left and R sided trigeminal pain.     On last visit:     Neck pain is 5/10 on VAS, at maximum is 6/10. Pain is sharp in nature. Pain is referred top of left shoulder, 1st and 2nd digit- resolved after TATIANA.  The pain is intermittent. The pain is improved by norco. The pain is worse with turning his head, looking up.  Left sided sharp axial neck pain is his predominant complaint..      R sided facial pain from chronic trigeminal neuralgia.     Previous Injection:   9/12/2020-TATIANA C7-T1- 70% pain relief with radicular pain to shoulder and digits. Functional improvement.     Hx: Referred by Dr. Prasad, Marissa ROE MD for neck pain. Chronic history of neck pain. 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  He was given opioid pain medication without significant pain relief.  Seen by Dr. Gold with neurosurgery on 7/28/2023 patient was given gabapentin, Medrol Dosepak, Flexeril by Dr. Gold.  Flexion-extension cervical spine x-ray along with MRI of cervical spine has been ordered and he was recommended cervical epidural injection. He works at [employer]. His wife has passed away 5 years ago. He lives by himself but his son lives close by- [employer]         PHQ-9- 5           SOAPP- 2        PMH:   Dm-2- uncontrolled (A1c - 10.2-11/2022), DVT, history of MI, CAD s/p stents, craniotomy, trigeminal nerve decompression-7 surgeries as per patient, knee ACL reconstruction, Cervical fusion-C5-C6; lumbar laminectomy and ACDF L2-5-as per patient had 4 surgeries in lumbar spine, knee arthroscopy, TIA, R sided face numbness.      Current Medications:   Norco 7.5-325 mg- 10 tabs in ER  Gabapentin 300 mg TID- made him sleepy  Flexeril 10 mg TID PRN  Aspirin 81 mg         Past Medications:  Robaxin-itching  Pregabalin-confusion     Past Modalities:  TENS:                                                                          no                                                  Physical Therapy Within The Last 6 Months              no  Psychotherapy                                                            no  Massage Therapy                                                       no     Patient Complains Of:  Uro-Fecal Incontinence          no  Weight Gain/Loss                   no  Fever/Chills                             no  Weakness                               no        PEG Assessment   What number best describes your pain on average in the past week?7  What number best describes how, during the past week, pain has interfered with your enjoyment of life?7  What number best describes how, during the past week, pain has interfered with your general activity?  7         Current Outpatient Medications:     aspirin 81 MG EC tablet, Take 1 tablet by mouth Daily., Disp: , Rfl:     atorvastatin (LIPITOR) 80 MG tablet, Take 1 tablet by mouth Daily., Disp: , Rfl:     B-D UF III MINI PEN NEEDLES 31G X 5 MM misc, TEST EVERY DAY, Disp: 100 each, Rfl: 1    gabapentin (NEURONTIN) 300 MG capsule, Take 1 capsule by mouth 3 (Three) Times a Day. Take 1 tablet at night for 1 week, then take 2 tablets a day for a week and if tolerating take full dosage, Disp: 45 capsule, Rfl: 0    Lancets (freestyle) lancets, FreeStyle Lancets, Disp: , Rfl:     metFORMIN ER (GLUCOPHAGE-XR) 750 MG 24 hr tablet, Take 2 tabs before breakfast., Disp: 180 tablet, Rfl: 3    pantoprazole (PROTONIX) 40 MG EC tablet, Take 1 tablet by mouth Daily., Disp: , Rfl:     Toujeo SoloStar 300 UNIT/ML solution pen-injector injection, Inject 30 Units under the skin into the appropriate area as directed Daily. Inject 25 units QHS, Disp: , Rfl:     Trulicity 0.75 MG/0.5ML solution pen-injector, INJECT 0.75 MG UNDER THE SKIN WEEKLY,  Disp: , Rfl:     cyclobenzaprine (FLEXERIL) 10 MG tablet, Take 1 tablet by mouth 3 (Three) Times a Day As Needed for Muscle Spasms., Disp: 45 tablet, Rfl: 0    HYDROcodone-acetaminophen (NORCO) 7.5-325 MG per tablet, Take 1 tablet by mouth Every 4 (Four) Hours As Needed for Moderate Pain., Disp: 10 tablet, Rfl: 0    methylPREDNISolone (MEDROL) 4 MG dose pack, Take as directed on package instructions., Disp: 21 tablet, Rfl: 0    The following portions of the patient's history were reviewed and updated as appropriate: allergies, current medications, past family history, past medical history, past social history, past surgical history, and problem list.      REVIEW OF PERTINENT MEDICAL DATA    Past Medical History:   Diagnosis Date    Anxiety     Cataracts, bilateral 2022    Deep vein thrombosis  approx    none since    Diabetes mellitus  approx    Type 2    Heart attack 2021    Mixed hyperlipidemia 3/8/2018    Stroke 2021    Ureteral stone with hydronephrosis 2016     Past Surgical History:   Procedure Laterality Date    BACK SURGERY  2019    4 rods in back    CARDIAC CATHETERIZATION  2021    CORONARY STENT PLACEMENT  2021    CRANIOTOMY      ENDOSCOPY      HERNIA REPAIR      KNEE ACL RECONSTRUCTION      KNEE ARTHROSCOPY      LAMINECTOMY      TRIGEMINAL NERVE DECOMPRESSION       Family History   Problem Relation Age of Onset    Diabetes Mother     Heart attack Father             Aneurysm Father      Social History     Socioeconomic History    Marital status:    Tobacco Use    Smoking status: Never    Smokeless tobacco: Never   Vaping Use    Vaping Use: Never used   Substance and Sexual Activity    Alcohol use: Yes     Alcohol/week: 4.0 standard drinks     Types: 4 Cans of beer per week     Comment: or less per week    Drug use: Never    Sexual activity: Never         Review of Systems   Musculoskeletal:  Positive for neck pain.       Vitals:    10/02/23 1312   BP:  112/75   Pulse: 73   Resp: 16   SpO2: 97%   PainSc:   5         Objective   Physical Exam  Neck:     Musculoskeletal:         General: Tenderness present.        Arms:    Neurological:      Deep Tendon Reflexes:      Reflex Scores:       Tricep reflexes are 2+ on the right side and 2+ on the left side.       Bicep reflexes are 2+ on the right side and 2+ on the left side.       Brachioradialis reflexes are 2+ on the right side and 2+ on the left side.     Comments: Motor strength 5/5 b/l UE  Sensory intact b/l UE             Imaging Reviewed:  Cervical Xray - 7/2023:   - C5-6 anterior orthopedic fixation, with osseous fusion across the C5-6 vertebral bodies, unchanged.   -  Moderate to moderately advanced multilevel cervical facet arthropathy is present, thought to be greatest on the left at C3-4, bilaterally at C4-5, and on the left at C6-7.  -  Signs of suboccipital craniotomy    CT cervical spine without contrast-7/23/2023   - Postsurgical changes with osseous disc fusion at C5-C6  - Multilevel degenerative changes-mild to moderate  - Multilevel facet disease present with mild to moderate neuroforaminal stenosis bilaterally most pronounced at C6-7     CT lumbar spine without contrast-2016  - L2-5 surgical fusion  - Mature anterior osseous fusion at L2-3, L3-4  - L1-2-moderate central stenosis; moderate facet arthropathy.  L2-3-moderate left foraminal stenosis and left facet arthropathy.  L4-5-severe degenerative facet arthropathy.  Left facet partial fusion.  Moderate left foraminal stenosis related to spondylosis.  S/p midline laminectomy.  - L5-S1-disc bulge with endplate hypertrophic changes.  Severe facet arthropathy.  S/p laminectomy.  Severe bilateral foraminal stenosis.       Assessment:    1. Cervical spondylosis    2. DDD (degenerative disc disease), cervical         Plan:   1.  Defer UDS for now.  2. We discussed trying a course of formal physical therapy.  Physical therapy can help strengthen and  stretch the muscles around the joints.  Patient will be starting therapy starting on 8/28/2023.  3. Good relief with TATIANA with radicular pain but continues to have left sided axial neck pain. Patient has mainly AXIAL neck pain. Patient informed they would likely benefit from a medial branch block on LEFT from C3 to C6.  MRI shows facet arthritis. Facet tenderness is positive from C3 and 7 on left. Patient informed the procedure is both therapeutic and diagnostic in nature.  The procedure was described in detail and the risks, benefits and alternatives were discussed with the patient (including but not limited to: bleeding, infection, nerve damage, worsening of pain, CSF leak, inability to perform injection, paralysis, seizures, and death) who agreed to proceed.  Discussed RFA at 70-80 degree for  sec if patient has good relief from 2 diagnostic MBB.      RTC for injection and then 3 week follow up.      Jaison Dickson DO  Pain Management   [facility]          INSPECT REPORT    As part of the patient's treatment plan, I may be prescribing controlled substances. The patient has been made aware of appropriate use of such medications, including potential risk of somnolence, limited ability to drive and/or work safely, and the potential for dependence or overdose. It has also been made clear that these medications are for use by this patient only, without concomitant use of alcohol or other substances unless prescribed.     Patient has completed prescribing agreement detailing terms of continued prescribing of controlled substances, including monitoring INSPECT reports, urine drug screening, and pill counts if necessary. The patient is aware that inappropriate use will results in cessation of prescribing such medications.    INSPECT report has been reviewed and scanned into the patient's chart.

## 2023-10-02 ENCOUNTER — OFFICE VISIT (OUTPATIENT)
Dept: PAIN MEDICINE | Facility: CLINIC | Age: 73
End: 2023-10-02
Payer: MEDICARE

## 2023-10-02 VITALS
HEART RATE: 73 BPM | OXYGEN SATURATION: 97 % | DIASTOLIC BLOOD PRESSURE: 75 MMHG | SYSTOLIC BLOOD PRESSURE: 112 MMHG | RESPIRATION RATE: 16 BRPM

## 2023-10-02 DIAGNOSIS — M47.812 CERVICAL SPONDYLOSIS: Primary | ICD-10-CM

## 2023-10-02 DIAGNOSIS — M50.30 DDD (DEGENERATIVE DISC DISEASE), CERVICAL: ICD-10-CM

## 2023-10-04 ENCOUNTER — TREATMENT (OUTPATIENT)
Dept: PHYSICAL THERAPY | Facility: CLINIC | Age: 73
End: 2023-10-04
Payer: MEDICARE

## 2023-10-04 DIAGNOSIS — Z78.9 IMPAIRED MOBILITY AND ADLS: ICD-10-CM

## 2023-10-04 DIAGNOSIS — M54.12 CERVICAL RADICULOPATHY: Primary | ICD-10-CM

## 2023-10-04 DIAGNOSIS — M54.2 PAIN, NECK: ICD-10-CM

## 2023-10-04 DIAGNOSIS — Z74.09 IMPAIRED MOBILITY AND ADLS: ICD-10-CM

## 2023-10-04 NOTE — PROGRESS NOTES
Physical Therapy Daily Treatment Note    Patient: Gurinder Amezcua   : 1950  Referring practitioner: Mat Gold MD  Diagnoses: Cervical radiculopathy [M54.12]  Today's Date: 10/4/2023    VISIT#: 6    Subjective Evaluation    History of Present Illness  Mechanism of injury: Pt reports he has been doing ok.  The injection is wearing off now and he is scheduled for another one on Oct 24.  It helped a lot in the first 48 hrs then started to wear off so the improvement has decreased over time.      Pain  Current pain ratin       Objective     See Exercise, Manual, and Modality Logs for complete treatment.     Cont with man, ex, NMR today    Pt with reports of pain throughout the session    Patient Education:  cont with HEP as instructed;  added new one today and asked pt to report next week the result     Assessment & Plan       Assessment  Assessment details: Sindy fair today with reports the epidural is wearing off       Progress per Plan of Care        Timed:         Manual Therapy:  10      mins  35691;     Therapeutic Exercise:   20      mins  21803;     Neuromuscular Carmen:  8     mins  99740;    Therapeutic Activity:          mins  47601;     Gait Training:           mins  87246;     Ultrasound:          mins  55645;    Ionto                                   mins   24927  Self Care                            mins   54532    Un-Timed:  Electrical Stimulation:         mins  80122 ( );  Traction          mins 36638  Canalith Repos                   mins  46746  Low Eval          Mins  19193  Mod Eval          Mins  01599  High Eval                            Mins  71175  Re-Eval                               mins  22725    Timed Treatment:  38    mins   Total Treatment:    38    mins    Komal Medeiros PT  Physical Therapist  [place] Lic. # [id number]

## 2023-10-05 ENCOUNTER — OFFICE VISIT (OUTPATIENT)
Dept: NEUROSURGERY | Facility: CLINIC | Age: 73
End: 2023-10-05
Payer: MEDICARE

## 2023-10-05 VITALS
BODY MASS INDEX: 24.77 KG/M2 | HEIGHT: 77 IN | WEIGHT: 209.8 LBS | SYSTOLIC BLOOD PRESSURE: 130 MMHG | DIASTOLIC BLOOD PRESSURE: 82 MMHG | RESPIRATION RATE: 18 BRPM | HEART RATE: 87 BPM

## 2023-10-05 DIAGNOSIS — M54.12 CERVICAL RADICULOPATHY AT C6: Primary | ICD-10-CM

## 2023-10-05 NOTE — PROGRESS NOTES
Neurosurgical Consultation      Gurinder Amezcua is a 73 y.o. male is here today for follow-up for neck pain. In the office today patient reports of neck pain that radiates into the left arm and shoulder. Patient drops anything in left hand.     Chief Complaint   Patient presents with    Neck Pain     Follow up         Previous treatment: 4 PT visits, injections 9/12    HPI: This is a 73-year-old gentleman who I last evaluated on July 28, 2023.  He has a history of diabetes with the most recent hemoglobin A1c of 10.2%.  Does have a history of coronary stenting as well as a prior stroke who presented with acute left-sided neck and left arm pain.  He has had 4 prior lumbar spinal surgeries as well as trigeminal neuralgia with 7 surgeries to attempt to address this all without success.  Approximately 1 week before my initial evaluation he presented with extreme left arm pain and left neck pain consistent with a C6 radicular nerve pattern.  He has had a prior C5-C6 anterior cervical discectomy which he initially did not remember at my last evaluation.  I recommended physical therapy.  He does get improvement with physical therapy however it is short-lived.  He has been completing the home exercises.  He did undergo an epidural steroid injection approximately 3 weeks ago and this has also subtly improved his symptoms.  The radiculopathy has improved and ascended so that the pain is no longer distal.  He has pain in his neck and his left shoulder.    Past Medical History:   Diagnosis Date    Anxiety     Cataracts, bilateral 04/2022    Deep vein thrombosis 2018 approx    none since    Diabetes mellitus 2017 approx    Type 2    Heart attack 06/17/2021    Mixed hyperlipidemia 3/8/2018    Stroke 07/21/2021    Ureteral stone with hydronephrosis 11/24/2016        Past Surgical History:   Procedure Laterality Date    BACK SURGERY  2019    4 rods in back    CARDIAC CATHETERIZATION  06/17/2021    CORONARY STENT PLACEMENT   06/17/2021    CRANIOTOMY      ENDOSCOPY      HERNIA REPAIR      KNEE ACL RECONSTRUCTION      KNEE ARTHROSCOPY      LAMINECTOMY      TRIGEMINAL NERVE DECOMPRESSION          Current Outpatient Medications on File Prior to Visit   Medication Sig Dispense Refill    aspirin 81 MG EC tablet Take 1 tablet by mouth Daily.      atorvastatin (LIPITOR) 80 MG tablet Take 1 tablet by mouth Daily.      B-D UF III MINI PEN NEEDLES 31G X 5 MM misc TEST EVERY  each 1    gabapentin (NEURONTIN) 300 MG capsule Take 1 capsule by mouth 3 (Three) Times a Day. Take 1 tablet at night for 1 week, then take 2 tablets a day for a week and if tolerating take full dosage 45 capsule 0    Lancets (freestyle) lancets FreeStyle Lancets      metFORMIN ER (GLUCOPHAGE-XR) 750 MG 24 hr tablet Take 2 tabs before breakfast. 180 tablet 3    pantoprazole (PROTONIX) 40 MG EC tablet Take 1 tablet by mouth Daily.      Toujeo SoloStar 300 UNIT/ML solution pen-injector injection Inject 30 Units under the skin into the appropriate area as directed Daily. Inject 25 units QHS      Trulicity 0.75 MG/0.5ML solution pen-injector INJECT 0.75 MG UNDER THE SKIN WEEKLY      [DISCONTINUED] cyclobenzaprine (FLEXERIL) 10 MG tablet Take 1 tablet by mouth 3 (Three) Times a Day As Needed for Muscle Spasms. 45 tablet 0    [DISCONTINUED] HYDROcodone-acetaminophen (NORCO) 7.5-325 MG per tablet Take 1 tablet by mouth Every 4 (Four) Hours As Needed for Moderate Pain. 10 tablet 0    [DISCONTINUED] methylPREDNISolone (MEDROL) 4 MG dose pack Take as directed on package instructions. 21 tablet 0     No current facility-administered medications on file prior to visit.        Allergies   Allergen Reactions    Adhesive Tape Itching     tegraderm patches causes itching    Methocarbamol Itching and Rash     Arms etc. No hives.       Pregabalin Confusion and Other (See Comments)     Hypnotic driving episode  Dizziness and inability to focus  Dizziness and inability to focus  Other  "reaction(s): Confusion  maggierica      Silver Rash        Social History     Socioeconomic History    Marital status:    Tobacco Use    Smoking status: Never    Smokeless tobacco: Never   Vaping Use    Vaping Use: Never used   Substance and Sexual Activity    Alcohol use: Yes     Alcohol/week: 4.0 standard drinks     Types: 4 Cans of beer per week     Comment: or less per week    Drug use: Never    Sexual activity: Never          Review of Systems   Constitutional:  Positive for activity change.   HENT: Negative.     Eyes: Negative.    Respiratory: Negative.     Cardiovascular: Negative.    Gastrointestinal: Negative.    Endocrine: Negative.    Genitourinary: Negative.    Musculoskeletal:  Positive for myalgias, neck pain and neck stiffness.   Skin: Negative.    Allergic/Immunologic: Negative.    Neurological:  Positive for weakness. Negative for numbness.   Hematological: Negative.       Physical Examination:     Vitals:    10/05/23 1331   BP: 130/82   BP Location: Right arm   Patient Position: Sitting   Cuff Size: Adult   Pulse: 87   Resp: 18   Weight: 95.2 kg (209 lb 12.8 oz)   Height: 195.6 cm (77\")   PainSc:   5   PainLoc: Neck        Physical Exam        Vitals:    10/05/23 1331   PainSc:   5   PainLoc: Neck            Neurological Exam   Neurological examination is stable compared to my last evaluation without any new red flag signs.    Result Review  The following data was reviewed by: Mat Gold MD on 10/05/2023:    Data reviewed : Radiologic studies flexion-extension x-rays of the cervical spine did not suggest any dynamic spondylolisthesis.    Assessment/plan:  This is a 73-year-old gentleman with at this juncture chronic neck pain as well as left arm pain that could resemble a C6 radicular pattern.  He does have a history of a prior C5-C6 anterior cervical discectomy and fusion.  He does not have dynamic spondylolisthesis.  He does not have an MRI. I recommend an MRI of his cervical spine.  I " recommend he continue working with physical therapy.  He has a repeat cervical epidural steroid injection scheduled for approximately 3 weeks from now.  I will have him return to see me in 6 weeks for clinical reevaluation as well as review of his MRI.  I have encouraged him to call with any questions or concerns.      Diagnoses and all orders for this visit:    1. Cervical radiculopathy at C6 (Primary)  -     MRI Cervical Spine Without Contrast; Future         Return in about 6 weeks (around 11/16/2023).            Mat Gold MD

## 2023-10-09 ENCOUNTER — TREATMENT (OUTPATIENT)
Dept: PHYSICAL THERAPY | Facility: CLINIC | Age: 73
End: 2023-10-09
Payer: MEDICARE

## 2023-10-09 DIAGNOSIS — Z78.9 IMPAIRED MOBILITY AND ADLS: ICD-10-CM

## 2023-10-09 DIAGNOSIS — Z74.09 IMPAIRED MOBILITY AND ADLS: ICD-10-CM

## 2023-10-09 DIAGNOSIS — M54.2 PAIN, NECK: ICD-10-CM

## 2023-10-09 DIAGNOSIS — M54.12 CERVICAL RADICULOPATHY: Primary | ICD-10-CM

## 2023-10-09 PROCEDURE — 97140 MANUAL THERAPY 1/> REGIONS: CPT | Performed by: PHYSICAL THERAPIST

## 2023-10-09 PROCEDURE — 97110 THERAPEUTIC EXERCISES: CPT | Performed by: PHYSICAL THERAPIST

## 2023-10-09 NOTE — PROGRESS NOTES
Physical Therapy Daily Treatment Note    [facility] [place]      Patient: Gurinder Amezcua   : 1950  Referring practitioner: Mat Gold MD  Date of Initial Visit: Type: THERAPY  Noted: 2023  Today's Date: 10/9/2023  Patient seen for 7 sessions       Visit Diagnoses:    ICD-10-CM ICD-9-CM   1. Cervical radiculopathy  M54.12 723.4   2. Impaired mobility and ADLs  Z74.09 V49.89    Z78.9    3. Pain, neck  M54.2 723.1       Subjective Evaluation    History of Present Illness    Subjective comment: c/o headache since waking up. Not a new headache but same pattern in his posterior neck and head. Looking for a job to help pay for bills and vet bills for his old dog.       Objective   See Exercise, Manual, and Modality Logs for complete treatment.       Assessment & Plan       Assessment  Assessment details: Pt with significant c/o pain and frustration with his pain. Lost his spouse 5 years ago and lives alone with his dog that has medical issues. Pt looking for a job but not sure what he can do with his neck pain.     P: assess benefit of KT tape. Drop Head Syndrome seems to be his primary issue thus strengthening should be helpful. Add more sh flexion ROM to prevent further rounding.   May also benefit from counseling/talk therapy for possible depression.           Timed:         Manual Therapy:    15     mins  03145;     Therapeutic Exercise:    10     mins  11949;     Neuromuscular Carmen:        mins  04445;    Therapeutic Activity:          mins  87269;     Gait Training:           mins  38078;     Ultrasound:          mins  20683;    Ionto                                   mins   51778  Self Care                            mins   81602  Canalith Repos         mins 67359  Work hardening   __   min 49801/66051      Un-Timed:  Electrical Stimulation:         mins  48547 ( );  Dry Needling          mins self-pay  Traction          mins 11315      Timed Treatment:   25   mins   Total Treatment:     35    mins    Zorre Zeno Kimura, PT  [place] License: [id number]    [place] License:  [id number]

## 2023-10-18 ENCOUNTER — TREATMENT (OUTPATIENT)
Dept: PHYSICAL THERAPY | Facility: CLINIC | Age: 73
End: 2023-10-18
Payer: MEDICARE

## 2023-10-18 DIAGNOSIS — M54.12 CERVICAL RADICULOPATHY: Primary | ICD-10-CM

## 2023-10-18 DIAGNOSIS — M54.2 PAIN, NECK: ICD-10-CM

## 2023-10-18 NOTE — PROGRESS NOTES
Physical Therapy Daily Treatment Note    Patient: Gurinder Amezcua   : 1950  Referring practitioner: Mat Gold MD  Diagnoses: Cervical radiculopathy [M54.12]  Today's Date: 10/18/2023    VISIT#: 8    Subjective Evaluation    History of Present Illness  Mechanism of injury: Pt reports he did not like the taping the last time.  It did not help and it pulled all of the hair off of his upper back when he took it off.  He does not want to do it again.  He also reports inc pain after manual therapy last session and feels like it was too much pressure.    Pain  Current pain ratin         Objective     See Exercise, Manual, and Modality Logs for complete treatment.     Cont with man and ex as noted;  inc wt for rows today    Assessment & Plan       Assessment  Assessment details: Sindy well today with inc wt on 1 ex and added reps to some others      Progress per Plan of Care        Timed:         Manual Therapy:  12       mins  31252;     Therapeutic Exercise:  20       mins  31865;     Neuromuscular Carmen:        mins  47458;    Therapeutic Activity:          mins  30236;     Gait Training:           mins  76771;     Ultrasound:          mins  00687;    Ionto                                   mins   98812  Self Care                            mins   60361      Un-Timed:  Electrical Stimulation:         mins  55645 ( );  Traction          mins 77886  Canalith Repos                   mins  50273  Low Eval          Mins  79358  Mod Eval          Mins  76365  High Eval                            Mins  22455  Re-Eval                               mins  31092    Timed Treatment:  32    mins   Total Treatment:    32    mins    Komal Medeiros PT  Physical Therapist  [place] Lic. # [id number]

## 2023-10-19 ENCOUNTER — HOSPITAL ENCOUNTER (OUTPATIENT)
Dept: CARDIOLOGY | Facility: HOSPITAL | Age: 73
Discharge: HOME | End: 2023-10-19
Payer: MEDICARE

## 2023-10-19 ENCOUNTER — HOSPITAL ENCOUNTER (EMERGENCY)
Facility: HOSPITAL | Age: 73
Discharge: HOME | End: 2023-10-19
Attending: EMERGENCY MEDICINE
Payer: MEDICARE

## 2023-10-19 VITALS
OXYGEN SATURATION: 94 % | BODY MASS INDEX: 24.99 KG/M2 | SYSTOLIC BLOOD PRESSURE: 136 MMHG | WEIGHT: 211.64 LBS | HEART RATE: 77 BPM | DIASTOLIC BLOOD PRESSURE: 92 MMHG | TEMPERATURE: 97.9 F | RESPIRATION RATE: 16 BRPM | HEIGHT: 77 IN

## 2023-10-19 DIAGNOSIS — M79.661 RIGHT CALF PAIN: ICD-10-CM

## 2023-10-19 DIAGNOSIS — M79.661 RIGHT CALF PAIN: Primary | ICD-10-CM

## 2023-10-19 LAB
ANION GAP SERPL CALCULATED.3IONS-SCNC: 7 MMOL/L (ref 5–15)
BASOPHILS # BLD AUTO: 0 10*3/MM3 (ref 0–0.2)
BASOPHILS NFR BLD AUTO: 0.8 % (ref 0–1.5)
BH CV LOW VAS RIGHT GASTRONEMIUS VESSEL: 1
BH CV LOW VAS RIGHT MID FEMORAL SPONT: 1
BH CV LOW VAS RIGHT PERONEAL VESSEL: 1
BH CV LOWER VASCULAR LEFT COMMON FEMORAL AUGMENT: NORMAL
BH CV LOWER VASCULAR LEFT COMMON FEMORAL COMPETENT: NORMAL
BH CV LOWER VASCULAR LEFT COMMON FEMORAL COMPRESS: NORMAL
BH CV LOWER VASCULAR LEFT COMMON FEMORAL PHASIC: NORMAL
BH CV LOWER VASCULAR LEFT COMMON FEMORAL SPONT: NORMAL
BH CV LOWER VASCULAR RIGHT COMMON FEMORAL AUGMENT: NORMAL
BH CV LOWER VASCULAR RIGHT COMMON FEMORAL COMPETENT: NORMAL
BH CV LOWER VASCULAR RIGHT COMMON FEMORAL COMPRESS: NORMAL
BH CV LOWER VASCULAR RIGHT COMMON FEMORAL PHASIC: NORMAL
BH CV LOWER VASCULAR RIGHT COMMON FEMORAL SPONT: NORMAL
BH CV LOWER VASCULAR RIGHT DISTAL FEMORAL COMPRESS: NORMAL
BH CV LOWER VASCULAR RIGHT GASTRONEMIUS COMPRESS: NORMAL
BH CV LOWER VASCULAR RIGHT GASTRONEMIUS THROMBUS: NORMAL
BH CV LOWER VASCULAR RIGHT GREATER SAPH AK COMPRESS: NORMAL
BH CV LOWER VASCULAR RIGHT GREATER SAPH BK COMPRESS: NORMAL
BH CV LOWER VASCULAR RIGHT LESSER SAPH COMPRESS: NORMAL
BH CV LOWER VASCULAR RIGHT MID FEMORAL AUGMENT: NORMAL
BH CV LOWER VASCULAR RIGHT MID FEMORAL COMPETENT: NORMAL
BH CV LOWER VASCULAR RIGHT MID FEMORAL COMPRESS: NORMAL
BH CV LOWER VASCULAR RIGHT MID FEMORAL PHASIC: NORMAL
BH CV LOWER VASCULAR RIGHT MID FEMORAL SPONT: NORMAL
BH CV LOWER VASCULAR RIGHT PERONEAL COMPRESS: NORMAL
BH CV LOWER VASCULAR RIGHT PERONEAL THROMBUS: NORMAL
BH CV LOWER VASCULAR RIGHT POPLITEAL AUGMENT: NORMAL
BH CV LOWER VASCULAR RIGHT POPLITEAL COMPETENT: NORMAL
BH CV LOWER VASCULAR RIGHT POPLITEAL COMPRESS: NORMAL
BH CV LOWER VASCULAR RIGHT POPLITEAL PHASIC: NORMAL
BH CV LOWER VASCULAR RIGHT POPLITEAL SPONT: NORMAL
BH CV LOWER VASCULAR RIGHT POSTERIOR TIBIAL COMPRESS: NORMAL
BH CV LOWER VASCULAR RIGHT PROXIMAL FEMORAL COMPRESS: NORMAL
BH CV LOWER VASCULAR RIGHT SAPHENOFEMORAL JUNCTION COMPRESS: NORMAL
BUN SERPL-MCNC: 16 MG/DL (ref 8–23)
BUN/CREAT SERPL: 16 (ref 7–25)
CALCIUM SPEC-SCNC: 9.7 MG/DL (ref 8.6–10.5)
CHLORIDE SERPL-SCNC: 101 MMOL/L (ref 98–107)
CO2 SERPL-SCNC: 30 MMOL/L (ref 22–29)
CREAT SERPL-MCNC: 1 MG/DL (ref 0.76–1.27)
D DIMER PPP FEU-MCNC: 1.21 MG/L (FEU) (ref 0–0.72)
DEPRECATED RDW RBC AUTO: 48.6 FL (ref 37–54)
EGFRCR SERPLBLD CKD-EPI 2021: 80 ML/MIN/1.73
EOSINOPHIL # BLD AUTO: 0.2 10*3/MM3 (ref 0–0.4)
EOSINOPHIL NFR BLD AUTO: 4.4 % (ref 0.3–6.2)
ERYTHROCYTE [DISTWIDTH] IN BLOOD BY AUTOMATED COUNT: 15.1 % (ref 12.3–15.4)
GLUCOSE SERPL-MCNC: 204 MG/DL (ref 65–99)
HCT VFR BLD AUTO: 40 % (ref 37.5–51)
HGB BLD-MCNC: 13.3 G/DL (ref 13–17.7)
LYMPHOCYTES # BLD AUTO: 1.4 10*3/MM3 (ref 0.7–3.1)
LYMPHOCYTES NFR BLD AUTO: 34.5 % (ref 19.6–45.3)
MCH RBC QN AUTO: 31 PG (ref 26.6–33)
MCHC RBC AUTO-ENTMCNC: 33.4 G/DL (ref 31.5–35.7)
MCV RBC AUTO: 92.9 FL (ref 79–97)
MONOCYTES # BLD AUTO: 0.4 10*3/MM3 (ref 0.1–0.9)
MONOCYTES NFR BLD AUTO: 10.1 % (ref 5–12)
NEUTROPHILS NFR BLD AUTO: 2 10*3/MM3 (ref 1.7–7)
NEUTROPHILS NFR BLD AUTO: 50.2 % (ref 42.7–76)
NRBC BLD AUTO-RTO: 0 /100 WBC (ref 0–0.2)
PLATELET # BLD AUTO: 145 10*3/MM3 (ref 140–450)
PMV BLD AUTO: 8.2 FL (ref 6–12)
POTASSIUM SERPL-SCNC: 4.1 MMOL/L (ref 3.5–5.2)
RBC # BLD AUTO: 4.3 10*6/MM3 (ref 4.14–5.8)
SODIUM SERPL-SCNC: 138 MMOL/L (ref 136–145)
WBC NRBC COR # BLD: 4 10*3/MM3 (ref 3.4–10.8)

## 2023-10-19 PROCEDURE — 80048 BASIC METABOLIC PNL TOTAL CA: CPT | Performed by: EMERGENCY MEDICINE

## 2023-10-19 PROCEDURE — 85025 COMPLETE CBC W/AUTO DIFF WBC: CPT | Performed by: EMERGENCY MEDICINE

## 2023-10-19 PROCEDURE — 25010000002 ENOXAPARIN PER 10 MG: Performed by: EMERGENCY MEDICINE

## 2023-10-19 PROCEDURE — 96372 THER/PROPH/DIAG INJ SC/IM: CPT

## 2023-10-19 PROCEDURE — 85379 FIBRIN DEGRADATION QUANT: CPT | Performed by: EMERGENCY MEDICINE

## 2023-10-19 PROCEDURE — 99283 EMERGENCY DEPT VISIT LOW MDM: CPT

## 2023-10-19 PROCEDURE — 99282 EMERGENCY DEPT VISIT SF MDM: CPT

## 2023-10-19 PROCEDURE — 93971 EXTREMITY STUDY: CPT

## 2023-10-19 RX ORDER — ENOXAPARIN SODIUM 100 MG/ML
1 INJECTION SUBCUTANEOUS ONCE
Status: COMPLETED | OUTPATIENT
Start: 2023-10-19 | End: 2023-10-19

## 2023-10-19 RX ADMIN — ENOXAPARIN SODIUM 100 MG: 100 INJECTION SUBCUTANEOUS at 01:56

## 2023-10-19 NOTE — ED PROVIDER NOTES
Subjective   History of Present Illness  73-year-old male with remote history of DVT 10 years ago after back surgery complains of right calf pain, nontraumatic for the past 2 days.  No chest symptoms, no other associated symptoms.  No recent trips or travel, no recent surgeries or procedures      Review of Systems   Respiratory: Negative.     Cardiovascular: Negative.    Musculoskeletal:         Right calf pain   Neurological: Negative.        Past Medical History:   Diagnosis Date    Anxiety     Cataracts, bilateral 2022    Deep vein thrombosis 2018 approx    none since    Diabetes mellitus 2017 approx    Type 2    Heart attack 2021    Mixed hyperlipidemia 3/8/2018    Stroke 2021    Ureteral stone with hydronephrosis 2016       Allergies   Allergen Reactions    Adhesive Tape Itching     tegraderm patches causes itching    Methocarbamol Itching and Rash     Arms etc. No hives.       Pregabalin Confusion and Other (See Comments)     Hypnotic driving episode  Dizziness and inability to focus  Dizziness and inability to focus  Other reaction(s): Confusion  lyrica      Silver Rash       Past Surgical History:   Procedure Laterality Date    BACK SURGERY  2019    4 rods in back    CARDIAC CATHETERIZATION  2021    CORONARY STENT PLACEMENT  2021    CRANIOTOMY      ENDOSCOPY      HERNIA REPAIR      KNEE ACL RECONSTRUCTION      KNEE ARTHROSCOPY      LAMINECTOMY      TRIGEMINAL NERVE DECOMPRESSION         Family History   Problem Relation Age of Onset    Diabetes Mother     Heart attack Father             Aneurysm Father        Social History     Socioeconomic History    Marital status:    Tobacco Use    Smoking status: Never    Smokeless tobacco: Never   Vaping Use    Vaping Use: Never used   Substance and Sexual Activity    Alcohol use: Yes     Alcohol/week: 4.0 standard drinks of alcohol     Types: 4 Cans of beer per week     Comment: or less per week    Drug use: Never     Sexual activity: Never           Objective   Physical Exam  Constitutional:       Appearance: Normal appearance.   HENT:      Head: Normocephalic and atraumatic.   Cardiovascular:      Rate and Rhythm: Normal rate and regular rhythm.      Pulses: Normal pulses.      Heart sounds: Normal heart sounds.   Pulmonary:      Effort: Pulmonary effort is normal.      Breath sounds: Normal breath sounds.   Musculoskeletal:      Comments: Right lower extremity with normal inspection, foot is warm and well-perfused, no edema, there is mild calf tenderness   Neurological:      Mental Status: He is alert.         Procedures           ED Course                                           Medical Decision Making  Elevated dimer, patient will get lovenox, am ultrasound to r/o dvt    Problems Addressed:  Right calf pain: complicated acute illness or injury    Amount and/or Complexity of Data Reviewed  Labs: ordered.    Risk  Prescription drug management.        Final diagnoses:   Right calf pain       ED Disposition  ED Disposition       ED Disposition   Discharge    Condition   Stable    Comment   --               No follow-up provider specified.       Medication List      No changes were made to your prescriptions during this visit.            Bang Martinez MD  10/19/23 0145

## 2023-10-19 NOTE — ED NOTES
Pt has been having right sided leg pain X2 days. Pt states it hurts from the top of his foot, up to the knee. Pt states he has had a blood clot in left 8-10 years ago in that leg

## 2023-10-24 ENCOUNTER — HOSPITAL ENCOUNTER (OUTPATIENT)
Dept: PAIN MEDICINE | Facility: HOSPITAL | Age: 73
Discharge: HOME | End: 2023-10-24
Payer: MEDICARE

## 2023-10-24 VITALS
HEIGHT: 77 IN | BODY MASS INDEX: 24.91 KG/M2 | WEIGHT: 211 LBS | HEART RATE: 92 BPM | RESPIRATION RATE: 14 BRPM | SYSTOLIC BLOOD PRESSURE: 134 MMHG | DIASTOLIC BLOOD PRESSURE: 82 MMHG | TEMPERATURE: 97.3 F | OXYGEN SATURATION: 96 %

## 2023-10-24 DIAGNOSIS — R52 PAIN: ICD-10-CM

## 2023-10-24 DIAGNOSIS — M47.812 CERVICAL SPONDYLOSIS: Primary | ICD-10-CM

## 2023-10-24 PROCEDURE — 25010000002 BUPIVACAINE (PF) 0.25 % SOLUTION: Performed by: STUDENT IN AN ORGANIZED HEALTH CARE EDUCATION/TRAINING PROGRAM

## 2023-10-24 PROCEDURE — 64490 INJ PARAVERT F JNT C/T 1 LEV: CPT | Performed by: STUDENT IN AN ORGANIZED HEALTH CARE EDUCATION/TRAINING PROGRAM

## 2023-10-24 PROCEDURE — 77003 FLUOROGUIDE FOR SPINE INJECT: CPT

## 2023-10-24 PROCEDURE — 25010000002 DEXAMETHASONE SODIUM PHOSPHATE 10 MG/ML SOLUTION: Performed by: STUDENT IN AN ORGANIZED HEALTH CARE EDUCATION/TRAINING PROGRAM

## 2023-10-24 PROCEDURE — 64491 INJ PARAVERT F JNT C/T 2 LEV: CPT | Performed by: STUDENT IN AN ORGANIZED HEALTH CARE EDUCATION/TRAINING PROGRAM

## 2023-10-24 RX ORDER — BUPIVACAINE HYDROCHLORIDE 2.5 MG/ML
10 INJECTION, SOLUTION EPIDURAL; INFILTRATION; INTRACAUDAL ONCE
Status: COMPLETED | OUTPATIENT
Start: 2023-10-24 | End: 2023-10-24

## 2023-10-24 RX ORDER — LIDOCAINE HYDROCHLORIDE 10 MG/ML
5 INJECTION, SOLUTION EPIDURAL; INFILTRATION; INTRACAUDAL; PERINEURAL ONCE
Status: COMPLETED | OUTPATIENT
Start: 2023-10-24 | End: 2023-10-24

## 2023-10-24 RX ORDER — DEXAMETHASONE SODIUM PHOSPHATE 10 MG/ML
10 INJECTION, SOLUTION INTRAMUSCULAR; INTRAVENOUS ONCE
Status: COMPLETED | OUTPATIENT
Start: 2023-10-24 | End: 2023-10-24

## 2023-10-24 RX ADMIN — BUPIVACAINE HYDROCHLORIDE 10 ML: 2.5 INJECTION, SOLUTION EPIDURAL; INFILTRATION; INTRACAUDAL; PERINEURAL at 14:42

## 2023-10-24 RX ADMIN — DEXAMETHASONE SODIUM PHOSPHATE 10 MG: 10 INJECTION, SOLUTION INTRAMUSCULAR; INTRAVENOUS at 14:42

## 2023-10-24 RX ADMIN — LIDOCAINE HYDROCHLORIDE 5 ML: 10 INJECTION, SOLUTION EPIDURAL; INFILTRATION; INTRACAUDAL; PERINEURAL at 14:38

## 2023-10-24 NOTE — PROCEDURES
PRE OPERATIVE DIAGNOSIS:    Cervical Spondylosis    POST OPERATIVE DIAGNOSIS:  Same.    PROCEDURE:  Left C 3,4, 5 and 6 Medial Branch Block.     PROCEDURE DETAILS:   After obtaining written informed consent patient was taken to the procedure room. Pre-procedure blood pressure and pulse were stable and recorded in patients clinic chart. The patient was placed in a prone position and the lower back was prepped with chloraprep and draped in the usual sterile fashion.  The skin was infiltrated with 1% lidocaine at the junction of transverse process with the vertebral body at the left C 6 level. A 22-gauge, 3.5-inch needle was inserted into the lumbar medial branch under radiographic guidance. Both AP and lateral views were obtained.   Following placement of the needle and negative aspiration, 1.0 cc of 0.25% bupivacaine with dexamethasone was injected. The identical procedure was performed on the left C 3, 4 and 5 medial branch. A total of 4 cc of 0.25% bupivacainewith 10 mg of dexamethasone was injected. During the procedure there was no evidence of CSF, paresthesias or heme. After the procedure the needles were removed. Skin was cleaned and a sterile dressing was applied.  Following the procedure the patient's vital signs were stable. The patient was discharged home in good condition after being given discharge instructions.    COMPLICATIONS None    Jaison Dickson DO  Pain Management   [facility]

## 2023-10-24 NOTE — H&P
H and P reviewed from previous visit and no changes to patient's clinical presentation. Will proceed with procedure as planned. Patient denies being on blood thinners.Hx of DM-2.       Jaison Dickson DO  Pain Management   [facility]

## 2023-10-25 ENCOUNTER — TELEPHONE (OUTPATIENT)
Dept: PAIN MEDICINE | Facility: HOSPITAL | Age: 73
End: 2023-10-25
Payer: MEDICARE

## 2023-10-25 NOTE — TELEPHONE ENCOUNTER
Patient stated his neck is better but he is still taking it easy this morning.  Pain is 75% better in the cervical area.  Call if any questions or concerns.

## 2023-10-26 ENCOUNTER — TREATMENT (OUTPATIENT)
Dept: PHYSICAL THERAPY | Facility: CLINIC | Age: 73
End: 2023-10-26
Payer: MEDICARE

## 2023-10-26 DIAGNOSIS — M54.12 CERVICAL RADICULOPATHY: Primary | ICD-10-CM

## 2023-10-26 DIAGNOSIS — Z74.09 IMPAIRED MOBILITY AND ADLS: ICD-10-CM

## 2023-10-26 DIAGNOSIS — Z78.9 IMPAIRED MOBILITY AND ADLS: ICD-10-CM

## 2023-10-26 DIAGNOSIS — M54.2 PAIN, NECK: ICD-10-CM

## 2023-10-26 NOTE — PROGRESS NOTES
Physical Therapy Daily Treatment Note    Patient: Gurinder Amezcua   : 1950  Referring practitioner: Mat Gold MD  Diagnoses: Cervical radiculopathy [M54.12]  Today's Date: 10/26/2023    VISIT#: 9    Subjective Evaluation    History of Present Illness  Mechanism of injury: Last week on the  he had severe pain in the right leg from the knee to the foot and went to the ER.  They did an US and the tech told him he had old blood clots stuck in his leg.  He has not heard from the MD yet but he did get a text that he has a blood clot.  He will see his PCP today at 4:00.  He is very frustrated with the whole process. Continues to have pain in the right leg.      Pain  Current pain rating: 3  Location: neck with left rotation         Objective     See Exercise, Manual, and Modality Logs for complete treatment.     Cont with man and ex;  held on several ex today due to confirmed blood clot and no medical management currently    Assessment & Plan       Assessment  Assessment details: Sindy fair today; very frustrated that he did not hear anything about the US until today.      Progress per Plan of Care        Timed:         Manual Therapy:  12       mins  66692;     Therapeutic Exercise:  12       mins  36808;     Neuromuscular Carmen:        mins  12096;    Therapeutic Activity:          mins  34672;     Gait Training:           mins  87909;     Ultrasound:          mins  88295;    Ionto                                   mins   27603  Self Care                            mins   96886    Un-Timed:  Electrical Stimulation:         mins  64290 ( );  Traction          mins 70778  Canalith Repos                   mins  09676  Low Eval          Mins  31902  Mod Eval          Mins  68737  High Eval                            Mins  94885  Re-Eval                               mins  25878    Timed Treatment: 24     mins   Total Treatment:    24    mins    Komal Medeiros PT  Physical Therapist  [place] Lic. #  [id number]

## 2023-11-06 NOTE — PROGRESS NOTES
Neurosurgical Consultation      Gurinder Amezcua is a 73 y.o. male is here today for follow-up.    Chief Complaint   Patient presents with    Neck Pain        Previous treatment: 10 PT, MRI 11/8, Injection 110/24-9/12    HPI: This is a 73-year-old gentleman who I last evaluated on October 5, 2023.  He has a history of diabetes with the most recent hemoglobin A1c of 10.2%.  He has a history of coronary artery stenting as well as prior stroke who has presented with left-sided more than right-sided neck pain and left arm pain.  He has had 4 prior lumbar spinal surgeries as well as 7 prior trigeminal neuralgia surgeries.  He has had a prior C5-C6 anterior cervical disc fusion.  He has worked with physical therapy and has not had profound improvement.  He has been evaluated by pain management and had an epidural steroid injection as well as a medial branch block.  He is unable to describe significant improvement with these interventions.  He did follow-up with the pain management specialist yesterday and the documentation suggest 70% pain relief with the cervical epidural and 90% pain relief with the medial branch block.  He has completed the MRI requested.  Of note he was recently diagnosed with a deep venous thrombus and has recently been started on Xarelto.    Past Medical History:   Diagnosis Date    Ankylosing spondylitis of site in spine 1998    Spine surgery    Anxiety     Cataracts, bilateral 04/2022    Cervical disc disorder     Chronic pain disorder     Deep vein thrombosis 2018 approx    none since    Depression 6/19    Death of wife    Diabetes mellitus 2017 approx    Type 2    Headache Always    Heart attack 06/17/2021    Low back pain     Lumbosacral disc disease     Migraine Unknown    Mixed hyperlipidemia 03/08/2018    Neck pain Feb 2023    Peripheral neuropathy     Rheumatoid arthritis Unknown    Shingles     Spinal stenosis     Stroke 07/21/2021    Trigeminal neuralgia     Ureteral stone with  hydronephrosis 11/24/2016        Past Surgical History:   Procedure Laterality Date    BACK SURGERY  2019    4 rods in back    CARDIAC CATHETERIZATION  06/17/2021    CORONARY STENT PLACEMENT  06/17/2021    CRANIOTOMY      ENDOSCOPY      EPIDURAL BLOCK      HERNIA REPAIR      KNEE ACL RECONSTRUCTION      KNEE ARTHROSCOPY      LAMINECTOMY      NECK SURGERY  Unknown    SPINAL CORD STIMULATOR IMPLANT  Previously    SPINAL FUSION  Unknown    SPINE SURGERY      TRIGEMINAL NERVE DECOMPRESSION      TRIGGER POINT INJECTION          Current Outpatient Medications on File Prior to Visit   Medication Sig Dispense Refill    aspirin 81 MG EC tablet Take 1 tablet by mouth Daily.      atorvastatin (LIPITOR) 80 MG tablet Take 1 tablet by mouth Daily.      B-D UF III MINI PEN NEEDLES 31G X 5 MM misc TEST EVERY  each 1    HYDROcodone-acetaminophen (NORCO) 5-325 MG per tablet Take 1 tablet by mouth Every 8 (Eight) Hours As Needed. for pain      Lancets (freestyle) lancets FreeStyle Lancets      metFORMIN ER (GLUCOPHAGE-XR) 750 MG 24 hr tablet Take 2 tabs before breakfast. 180 tablet 3    pantoprazole (PROTONIX) 40 MG EC tablet Take 1 tablet by mouth Daily.      rivaroxaban (XARELTO) 20 MG tablet       Toujeo SoloStar 300 UNIT/ML solution pen-injector injection Inject 30 Units under the skin into the appropriate area as directed Daily. Inject 25 units QHS      Trulicity 0.75 MG/0.5ML solution pen-injector INJECT 0.75 MG UNDER THE SKIN WEEKLY      [DISCONTINUED] gabapentin (NEURONTIN) 300 MG capsule Take 1 capsule by mouth 3 (Three) Times a Day. Take 1 tablet at night for 1 week, then take 2 tablets a day for a week and if tolerating take full dosage 45 capsule 0     No current facility-administered medications on file prior to visit.        Allergies   Allergen Reactions    Adhesive Tape Itching     tegraderm patches causes itching    Methocarbamol Itching and Rash     Arms etc. No hives.       Pregabalin Confusion and Other (See  "Comments)     Hypnotic driving episode  Dizziness and inability to focus  Dizziness and inability to focus  Other reaction(s): Confusion  lyrica      Silver Rash        Social History     Socioeconomic History    Marital status:    Tobacco Use    Smoking status: Never    Smokeless tobacco: Never   Vaping Use    Vaping Use: Never used   Substance and Sexual Activity    Alcohol use: Not Currently     Alcohol/week: 4.0 standard drinks of alcohol     Comment: or less per week    Drug use: Never    Sexual activity: Never          Review of Systems   Constitutional:  Positive for activity change.   Musculoskeletal:  Positive for myalgias, neck pain and neck stiffness.   Neurological:  Positive for numbness.        + tingling        Physical Examination:     Vitals:    11/14/23 1408   BP: 140/75   Pulse: 84   Resp: 18   Temp: 98.3 °F (36.8 °C)   SpO2: 97%   Weight: 95.7 kg (211 lb)   Height: 195.6 cm (77\")   PainSc:   7   PainLoc: Neck        Physical Exam     Neurological Exam   Neurological examination today appears somewhat improved compared to his last evaluation.  I do not appreciate significant subjective cervical radiculopathy.  His pain is predominantly in his neck at this juncture.    Result Review  The following data was reviewed by: Mat Gold MD on 11/14/2023:    Data reviewed : Radiologic studies MRI of the cervical spine shows the known C5-C6 anterior cervical disc fusion with bone formation across the interspace.  There is indication of C4-C5 adjacent segment disease with disc degeneration and disc bulging.  This causes moderate to severe central canal stenosis.  It also causes lateral recess stenosis.  There is no dynamic instability appreciated on flexion and extension.      Assessment/plan:  This is a 73-year-old gentleman with significant comorbidities including poorly controlled diabetes as well as cardiac disease who was recently diagnosed with a right leg DVT and started on Xarelto.  He " has neck pain worse on the left side than the right side.  I initially felt as though he had cervical radiculopathy however it does not appear profound at this juncture.  He is working with pain management and per their documentation he is getting relief with interventions.  He is pending an additional intervention in the near future.  I recommend continuing working with pain management.  He needs to better control his diabetes.  He needs to be able to get off of his Xarelto.  He can follow-up with me in 3 months for clinical reevaluation.  I have encouraged him to call with any questions or concerns.    Diagnoses and all orders for this visit:    1. Adjacent segment disease of cervical spine at C4-C5 level with history of fusion procedure (Primary)         Return in about 3 months (around 2/14/2024).            Mat Gold MD

## 2023-11-08 ENCOUNTER — HOSPITAL ENCOUNTER (OUTPATIENT)
Dept: MRI IMAGING | Facility: HOSPITAL | Age: 73
Discharge: HOME | End: 2023-11-08
Admitting: NEUROLOGICAL SURGERY
Payer: MEDICARE

## 2023-11-08 DIAGNOSIS — M54.12 CERVICAL RADICULOPATHY AT C6: ICD-10-CM

## 2023-11-08 PROCEDURE — 72141 MRI NECK SPINE W/O DYE: CPT

## 2023-11-09 ENCOUNTER — TREATMENT (OUTPATIENT)
Dept: PHYSICAL THERAPY | Facility: CLINIC | Age: 73
End: 2023-11-09
Payer: MEDICARE

## 2023-11-09 DIAGNOSIS — M54.12 CERVICAL RADICULOPATHY: Primary | ICD-10-CM

## 2023-11-09 DIAGNOSIS — Z74.09 IMPAIRED MOBILITY AND ADLS: ICD-10-CM

## 2023-11-09 DIAGNOSIS — M54.2 PAIN, NECK: ICD-10-CM

## 2023-11-09 DIAGNOSIS — Z78.9 IMPAIRED MOBILITY AND ADLS: ICD-10-CM

## 2023-11-09 NOTE — PROGRESS NOTES
Physical Therapy Daily Treatment Note    Patient: Gurinder Amezcua   : 1950  Referring practitioner: Mat Gold MD  Diagnoses: Cervical radiculopathy [M54.12]  Today's Date: 2023    VISIT#: 10    Subjective Evaluation    History of Present Illness  Mechanism of injury: Pt reports he has been to the MD and was told he does have a blood clot in his right calf and he has started on meds.  He also got a Rx for Hydrocodone.  He had the MRI and does not have the results yet.           Objective     See Exercise, Manual, and Modality Logs for complete treatment.      Cont with ex and NMR today    Discussed that he has not experienced relief from PT so we are not going to continue unless we get more information from the MRI or surgeon that would necessitate continuing    Patient Education:  cont with HEP as instructed and reviewed today    Assessment & Plan       Assessment  Assessment details: Pt is requesting to hold PT for now due to not getting relief as expected.  He will see the surgeon and go from there.  Will be on HOLD until POC expires.      Progress per Plan of Care        Timed:         Manual Therapy:         mins  49567;     Therapeutic Exercise:  20       mins  10824;     Neuromuscular Carmen:  8      mins  72115;    Therapeutic Activity:          mins  53677;     Gait Training:           mins  97618;     Ultrasound:          mins  42811;    Ionto                                   mins   38175  Self Care                            mins   02662      Un-Timed:  Electrical Stimulation:         mins  00890 ( );  Traction          mins 80236  Canalith Repos                   mins  53467  Low Eval          Mins  05834  Mod Eval          Mins  52519  High Eval                            Mins  01900  Re-Eval                               mins  00995    Timed Treatment:  28    mins   Total Treatment:    28    mins    Komal Medeiros PT  Physical Therapist  [place] Lic. # [id number]

## 2023-11-09 NOTE — PROGRESS NOTES
Subjective   Gurinder Amezcua is a 73 y.o. male is here for follow up for neck pain.  Patient was last seen for cervical MBB on left side.  Cervical MRI has been obtained since last visit as well. Excellent pain relief with MBB bust lasting only for 10 days. He complaints of severe left sided axial neck pain and unable to turn his head while driving car due to pain. 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    On last visit:     Neck pain is 5/10 on VAS, at maximum is 7/10. Pain is sharp in nature. Pain is referred top of left shoulder, 1st and 2nd digit- resolved after TATIANA.  The pain is intermittent. The pain is improved by norco. The pain is worse with turning his head, looking up.  Left sided sharp axial neck pain is his predominant complaint.     R sided facial pain from chronic trigeminal neuralgia.     Previous Injection:   10/24/2023 -left C3-C6 MBB- 90% Pain relief with VAS down from 6/10 to 1/10. 10 days of relief.   9/12/2023 -TATIANA C7-T1- 70% pain relief with radicular pain to shoulder and digits. Functional improvement.     Hx: Referred by Dr. Prasad, Marissa ROE MD for neck pain. Chronic history of neck pain. 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  He was given opioid pain medication without significant pain relief.  Seen by Dr. Gold with neurosurgery on 7/28/2023 patient was given gabapentin, Medrol Dosepak, Flexeril by Dr. Gold.  Flexion-extension cervical spine x-ray along with MRI of cervical spine has been ordered and he was recommended cervical epidural injection. He works at [employer]. His wife has passed away 5 years ago. He lives by himself but his son lives close by- [employer]         PHQ-9- 5           SOAPP- 2        PMH:   Dm-2- uncontrolled (A1c - 10.2-11/2022), DVT, history of MI, CAD s/p stents, craniotomy, trigeminal nerve decompression-7 surgeries as  per patient, knee ACL reconstruction, Cervical fusion-C5-C6; lumbar laminectomy and ACDF L2-5-as per patient had 4 surgeries in lumbar spine, knee arthroscopy, TIA, R sided face numbness.      Current Medications:   Norco 7.5-325 mg- 10 tabs in ER  Gabapentin 300 mg TID- made him sleepy  Flexeril 10 mg TID PRN  Aspirin 81 mg        Past Medications:  Robaxin-itching  Pregabalin-confusion     Past Modalities:  TENS:                                                                          no                                                  Physical Therapy Within The Last 6 Months              no  Psychotherapy                                                            no  Massage Therapy                                                       no     Patient Complains Of:  Uro-Fecal Incontinence          no  Weight Gain/Loss                   no  Fever/Chills                             no  Weakness                               no        PEG Assessment   What number best describes your pain on average in the past week?7  What number best describes how, during the past week, pain has interfered with your enjoyment of life?7  What number best describes how, during the past week, pain has interfered with your general activity?  7         Current Outpatient Medications:     aspirin 81 MG EC tablet, Take 1 tablet by mouth Daily., Disp: , Rfl:     atorvastatin (LIPITOR) 80 MG tablet, Take 1 tablet by mouth Daily., Disp: , Rfl:     B-D UF III MINI PEN NEEDLES 31G X 5 MM misc, TEST EVERY DAY, Disp: 100 each, Rfl: 1    HYDROcodone-acetaminophen (NORCO) 5-325 MG per tablet, Take 1 tablet by mouth Every 8 (Eight) Hours As Needed. for pain, Disp: , Rfl:     Lancets (freestyle) lancets, FreeStyle Lancets, Disp: , Rfl:     metFORMIN ER (GLUCOPHAGE-XR) 750 MG 24 hr tablet, Take 2 tabs before breakfast., Disp: 180 tablet, Rfl: 3    pantoprazole (PROTONIX) 40 MG EC tablet, Take 1 tablet by mouth Daily., Disp: , Rfl:     rivaroxaban  (XARELTO) 20 MG tablet, , Disp: , Rfl:     Toujeo SoloStar 300 UNIT/ML solution pen-injector injection, Inject 30 Units under the skin into the appropriate area as directed Daily. Inject 25 units QHS, Disp: , Rfl:     Trulicity 0.75 MG/0.5ML solution pen-injector, INJECT 0.75 MG UNDER THE SKIN WEEKLY, Disp: , Rfl:     gabapentin (NEURONTIN) 300 MG capsule, Take 1 capsule by mouth 3 (Three) Times a Day. Take 1 tablet at night for 1 week, then take 2 tablets a day for a week and if tolerating take full dosage, Disp: 45 capsule, Rfl: 0    The following portions of the patient's history were reviewed and updated as appropriate: allergies, current medications, past family history, past medical history, past social history, past surgical history, and problem list.      REVIEW OF PERTINENT MEDICAL DATA    Past Medical History:   Diagnosis Date    Ankylosing spondylitis of site in spine 1998    Spine surgery    Anxiety     Cataracts, bilateral 04/2022    Cervical disc disorder     Chronic pain disorder     Deep vein thrombosis 2018 approx    none since    Depression 6/19    Death of wife    Diabetes mellitus 2017 approx    Type 2    Headache Always    Heart attack 06/17/2021    Low back pain     Lumbosacral disc disease     Migraine Unknown    Mixed hyperlipidemia 03/08/2018    Neck pain Feb 2023    Peripheral neuropathy     Rheumatoid arthritis Unknown    Shingles     Spinal stenosis     Stroke 07/21/2021    Trigeminal neuralgia     Ureteral stone with hydronephrosis 11/24/2016     Past Surgical History:   Procedure Laterality Date    BACK SURGERY  2019    4 rods in back    CARDIAC CATHETERIZATION  06/17/2021    CORONARY STENT PLACEMENT  06/17/2021    CRANIOTOMY      ENDOSCOPY      EPIDURAL BLOCK      HERNIA REPAIR      KNEE ACL RECONSTRUCTION      KNEE ARTHROSCOPY      LAMINECTOMY      NECK SURGERY  Unknown    SPINAL CORD STIMULATOR IMPLANT  Previously    SPINAL FUSION  Unknown    SPINE SURGERY      TRIGEMINAL NERVE  DECOMPRESSION      TRIGGER POINT INJECTION       Family History   Problem Relation Age of Onset    Diabetes Mother             Heart attack Father             Aneurysm Father      Social History     Socioeconomic History    Marital status:    Tobacco Use    Smoking status: Never    Smokeless tobacco: Never   Vaping Use    Vaping Use: Never used   Substance and Sexual Activity    Alcohol use: Not Currently     Alcohol/week: 4.0 standard drinks of alcohol     Comment: or less per week    Drug use: Never    Sexual activity: Never         Review of Systems   Musculoskeletal:  Positive for neck pain.         Vitals:    23 1330   BP: 108/63   Pulse: 90   Resp: 16   SpO2: 96%   PainSc:   7           Objective   Physical Exam  Neck:     Musculoskeletal:         General: Tenderness present.        Arms:    Neurological:      Deep Tendon Reflexes:      Reflex Scores:       Tricep reflexes are 2+ on the right side and 2+ on the left side.       Bicep reflexes are 2+ on the right side and 2+ on the left side.       Brachioradialis reflexes are 2+ on the right side and 2+ on the left side.     Comments: Motor strength 5/5 b/l UE  Sensory intact b/l UE               Imaging Reviewed:  MRI cervical spine without contrast-2023  - C3-4-disc osteophyte to the right causing mild compression.  Severe right and moderate left foraminal stenosis with bone marrow edema in left facet joint  C4-5-moderate central canal stenosis.  Moderate to severe bilateral foraminal stenosis with bilateral facet arthropathy  C5-C6-ACDF.  Moderate left and mild right foraminal stenosis with facet hypertrophy  C6-7-mild stenosis.  Moderate to severe bilateral foraminal stenosis  C7-T1-no significant stenosis.    Cervical Xray - 2023:   - C5-6 anterior orthopedic fixation, with osseous fusion across the C5-6 vertebral bodies, unchanged.   -  Moderate to moderately advanced multilevel cervical facet arthropathy is present,  thought to be greatest on the left at C3-4, bilaterally at C4-5, and on the left at C6-7.  -  Signs of suboccipital craniotomy    CT cervical spine without contrast-7/23/2023   - Postsurgical changes with osseous disc fusion at C5-C6  - Multilevel degenerative changes-mild to moderate  - Multilevel facet disease present with mild to moderate neuroforaminal stenosis bilaterally most pronounced at C6-7     CT lumbar spine without contrast-2016  - L2-5 surgical fusion  - Mature anterior osseous fusion at L2-3, L3-4  - L1-2-moderate central stenosis; moderate facet arthropathy.  L2-3-moderate left foraminal stenosis and left facet arthropathy.  L4-5-severe degenerative facet arthropathy.  Left facet partial fusion.  Moderate left foraminal stenosis related to spondylosis.  S/p midline laminectomy.  - L5-S1-disc bulge with endplate hypertrophic changes.  Severe facet arthropathy.  S/p laminectomy.  Severe bilateral foraminal stenosis.       Assessment:    1. Cervical spondylosis    2. DDD (degenerative disc disease), cervical           Plan:   1.  Defer UDS for now.  2. We discussed trying a course of formal physical therapy.  Physical therapy can help strengthen and stretch the muscles around the joints.  Patient will be starting therapy starting on 8/28/2023.  3. Excellent relief with MBB on left side. Good relief with TATIANA with radicular pain but continues to have left sided axial neck pain. Patient has mainly AXIAL neck pain. Patient informed they would likely benefit from a second diagnostic local only medial branch block on LEFT from C3 to C6.  MRI shows facet arthritis. Facet tenderness is positive from C3 and 7 on left. Patient informed the procedure is both therapeutic and diagnostic in nature.  The procedure was described in detail and the risks, benefits and alternatives were discussed with the patient (including but not limited to: bleeding, infection, nerve damage, worsening of pain, CSF leak, inability to  perform injection, paralysis, seizures, and death) who agreed to proceed.  Discussed RFA at 70-80 degree for  sec if patient has good relief from 2 diagnostic MBB.    4. MRI cervical spine was reviewed using images.     RTC for injection.      Jaison Dickson DO  Pain Management   [facility]          INSPECT REPORT    As part of the patient's treatment plan, I may be prescribing controlled substances. The patient has been made aware of appropriate use of such medications, including potential risk of somnolence, limited ability to drive and/or work safely, and the potential for dependence or overdose. It has also been made clear that these medications are for use by this patient only, without concomitant use of alcohol or other substances unless prescribed.     Patient has completed prescribing agreement detailing terms of continued prescribing of controlled substances, including monitoring INSPECT reports, urine drug screening, and pill counts if necessary. The patient is aware that inappropriate use will results in cessation of prescribing such medications.    INSPECT report has been reviewed and scanned into the patient's chart.

## 2023-11-13 ENCOUNTER — OFFICE VISIT (OUTPATIENT)
Dept: PAIN MEDICINE | Facility: CLINIC | Age: 73
End: 2023-11-13
Payer: MEDICARE

## 2023-11-13 VITALS
OXYGEN SATURATION: 96 % | HEART RATE: 90 BPM | RESPIRATION RATE: 16 BRPM | SYSTOLIC BLOOD PRESSURE: 108 MMHG | DIASTOLIC BLOOD PRESSURE: 63 MMHG

## 2023-11-13 DIAGNOSIS — M50.30 DDD (DEGENERATIVE DISC DISEASE), CERVICAL: ICD-10-CM

## 2023-11-13 DIAGNOSIS — M47.812 CERVICAL SPONDYLOSIS: Primary | ICD-10-CM

## 2023-11-13 PROCEDURE — 99214 OFFICE O/P EST MOD 30 MIN: CPT | Performed by: STUDENT IN AN ORGANIZED HEALTH CARE EDUCATION/TRAINING PROGRAM

## 2023-11-13 PROCEDURE — 1125F AMNT PAIN NOTED PAIN PRSNT: CPT | Performed by: STUDENT IN AN ORGANIZED HEALTH CARE EDUCATION/TRAINING PROGRAM

## 2023-11-13 PROCEDURE — 1159F MED LIST DOCD IN RCRD: CPT | Performed by: STUDENT IN AN ORGANIZED HEALTH CARE EDUCATION/TRAINING PROGRAM

## 2023-11-13 PROCEDURE — 1160F RVW MEDS BY RX/DR IN RCRD: CPT | Performed by: STUDENT IN AN ORGANIZED HEALTH CARE EDUCATION/TRAINING PROGRAM

## 2023-11-13 RX ORDER — HYDROCODONE BITARTRATE AND ACETAMINOPHEN 5; 325 MG/1; MG/1
1 TABLET ORAL EVERY 8 HOURS PRN
COMMUNITY
Start: 2023-10-27

## 2023-11-14 ENCOUNTER — OFFICE VISIT (OUTPATIENT)
Dept: NEUROSURGERY | Facility: CLINIC | Age: 73
End: 2023-11-14
Payer: MEDICARE

## 2023-11-14 VITALS
SYSTOLIC BLOOD PRESSURE: 140 MMHG | OXYGEN SATURATION: 97 % | HEIGHT: 77 IN | BODY MASS INDEX: 24.91 KG/M2 | HEART RATE: 84 BPM | TEMPERATURE: 98.3 F | RESPIRATION RATE: 18 BRPM | DIASTOLIC BLOOD PRESSURE: 75 MMHG | WEIGHT: 211 LBS

## 2023-11-14 DIAGNOSIS — Z98.1 ADJACENT SEGMENT DISEASE OF CERVICAL SPINE AT C4-C5 LEVEL WITH HISTORY OF FUSION PROCEDURE: Primary | ICD-10-CM

## 2023-11-14 DIAGNOSIS — M50.321 ADJACENT SEGMENT DISEASE OF CERVICAL SPINE AT C4-C5 LEVEL WITH HISTORY OF FUSION PROCEDURE: Primary | ICD-10-CM

## 2023-11-14 PROCEDURE — 1160F RVW MEDS BY RX/DR IN RCRD: CPT | Performed by: NEUROLOGICAL SURGERY

## 2023-11-14 PROCEDURE — 99214 OFFICE O/P EST MOD 30 MIN: CPT | Performed by: NEUROLOGICAL SURGERY

## 2023-11-14 PROCEDURE — 1159F MED LIST DOCD IN RCRD: CPT | Performed by: NEUROLOGICAL SURGERY

## 2023-11-28 ENCOUNTER — HOSPITAL ENCOUNTER (OUTPATIENT)
Dept: PAIN MEDICINE | Facility: HOSPITAL | Age: 73
Discharge: HOME | End: 2023-11-28
Payer: MEDICARE

## 2023-11-28 DIAGNOSIS — M47.812 CERVICAL SPONDYLOSIS: Primary | ICD-10-CM

## 2023-11-28 DIAGNOSIS — R52 PAIN: ICD-10-CM

## 2023-11-28 RX ORDER — LIDOCAINE HYDROCHLORIDE 10 MG/ML
5 INJECTION, SOLUTION EPIDURAL; INFILTRATION; INTRACAUDAL; PERINEURAL ONCE
Status: DISCONTINUED | OUTPATIENT
Start: 2023-11-28 | End: 2023-11-30 | Stop reason: HOSPADM

## 2023-11-28 RX ORDER — DEXAMETHASONE SODIUM PHOSPHATE 10 MG/ML
10 INJECTION, SOLUTION INTRAMUSCULAR; INTRAVENOUS ONCE
Status: DISCONTINUED | OUTPATIENT
Start: 2023-11-28 | End: 2023-11-30 | Stop reason: HOSPADM

## 2023-11-28 RX ORDER — BUPIVACAINE HYDROCHLORIDE 2.5 MG/ML
10 INJECTION, SOLUTION EPIDURAL; INFILTRATION; INTRACAUDAL ONCE
Status: DISCONTINUED | OUTPATIENT
Start: 2023-11-28 | End: 2023-11-30 | Stop reason: HOSPADM

## 2023-11-28 NOTE — H&P
Patient has been on Xarelto recently and had not stopped for MBB.  Discussed with patient that we cannot proceed with scheduled MBB procedure as there is significant risk of bleeding with the procedure.  Patient upset that we were not able to do the procedure today and does not want to reschedule.    Jaison Dickson DO  Pain Management   [facility]

## 2023-12-08 NOTE — PROGRESS NOTES
Neurosurgical Consultation      Gurinder Amezcua is a 73 y.o. male is here today for follow-up for neck pain. Today patient reports increased neck pain on the left side.    Chief Complaint   Patient presents with    Neck Pain     Follow up         Previous treatment: Physical Therapy, CAMELIA,MBB, Prior Lumbar surgeries as well as C5-C6 anterior fusion,  Hydrocodone    HPI: This is a 73-year-old gentleman who I last evaluated on November 14, 2023.  He has a history of diabetes with a most recent hemoglobin A1c of 10.2% however he comments that he has been working with his doctor and has made improvements believing that his most recent hemoglobin A1c is 8.5%.  He does have a history of coronary stenting as well as prior stroke who presents with left-sided more than right-sided neck pain and left arm pain.  He has had 4 prior lumbar spinal surgeries as well as 7 prior trigeminal neuralgia surgeries.  He has a history of a C5-C6 anterior cervical discectomy and fusion.  He failed physical therapy.  He has had pain management interventions including epidural steroid injections and medial branch blocks.  He feels as though they have provided marginal relief however documentation suggests greater than 70 prior stent relief with both of these interventions.  He recently completed an MRI of the cervical spine and it does show significant adjacent segment disease at C4-C5.  He had recently been diagnosed with a deep venous thrombosis and started on Xarelto.  He has elected to discontinue the Xarelto on December 6.  The major impetus for this was being denied interventions in his neck by the pain management specialist.    Past Medical History:   Diagnosis Date    Ankylosing spondylitis of site in spine 1998    Spine surgery    Anxiety     Carotid artery occlusion     Cataracts, bilateral 04/2022    Cervical disc disorder     Chronic pain disorder     Deep vein thrombosis 2018 approx    none since    Depression 6/19    Death of  wife    Diabetes mellitus 2017 approx    Type 2    Headache Always    Heart attack 06/17/2021    Low back pain     Lumbosacral disc disease     Migraine Unknown    Mixed hyperlipidemia 03/08/2018    Neck pain Feb 2023    Peripheral neuropathy     Rheumatoid arthritis Unknown    Shingles     Spinal stenosis     Stroke 07/21/2021    Thoracic disc disorder     Trigeminal neuralgia     Ureteral stone with hydronephrosis 11/24/2016        Past Surgical History:   Procedure Laterality Date    BACK SURGERY  2019    4 rods in back    CARDIAC CATHETERIZATION  06/17/2021    CORONARY STENT PLACEMENT  06/17/2021    CRANIOTOMY      ENDOSCOPY      EPIDURAL BLOCK      HERNIA REPAIR      KNEE ACL RECONSTRUCTION      KNEE ARTHROSCOPY      LAMINECTOMY      NECK SURGERY  Unknown    SPINAL CORD STIMULATOR IMPLANT  Previously    SPINAL FUSION  Unknown    SPINE SURGERY      TRIGEMINAL NERVE DECOMPRESSION      TRIGGER POINT INJECTION          Current Outpatient Medications on File Prior to Visit   Medication Sig Dispense Refill    aspirin 81 MG EC tablet Take 1 tablet by mouth Daily.      atorvastatin (LIPITOR) 80 MG tablet Take 1 tablet by mouth Daily.      B-D UF III MINI PEN NEEDLES 31G X 5 MM misc TEST EVERY  each 1    HYDROcodone-acetaminophen (NORCO) 5-325 MG per tablet Take 1 tablet by mouth Every 8 (Eight) Hours As Needed. for pain      Lancets (freestyle) lancets FreeStyle Lancets      metFORMIN ER (GLUCOPHAGE-XR) 750 MG 24 hr tablet Take 2 tabs before breakfast. 180 tablet 3    pantoprazole (PROTONIX) 40 MG EC tablet Take 1 tablet by mouth Daily.      Toman SoloStar 300 UNIT/ML solution pen-injector injection Inject 30 Units under the skin into the appropriate area as directed Daily. Inject 25 units QHS      Trulicity 0.75 MG/0.5ML solution pen-injector INJECT 0.75 MG UNDER THE SKIN WEEKLY      rivaroxaban (XARELTO) 20 MG tablet  (Patient not taking: Reported on 12/15/2023)       No current facility-administered  "medications on file prior to visit.        Allergies   Allergen Reactions    Adhesive Tape Itching     tegraderm patches causes itching    Methocarbamol Itching and Rash     Arms etc. No hives.       Pregabalin Confusion and Other (See Comments)     Hypnotic driving episode  Dizziness and inability to focus  Dizziness and inability to focus  Other reaction(s): Confusion  lyrica      Silver Rash        Social History     Socioeconomic History    Marital status:    Tobacco Use    Smoking status: Never    Smokeless tobacco: Never   Vaping Use    Vaping Use: Never used   Substance and Sexual Activity    Alcohol use: Not Currently     Alcohol/week: 4.0 standard drinks of alcohol     Comment: or less per week    Drug use: Never    Sexual activity: Never          Review of Systems   Constitutional:  Positive for activity change.   HENT: Negative.     Eyes: Negative.    Respiratory: Negative.     Cardiovascular: Negative.    Gastrointestinal: Negative.    Endocrine: Negative.    Genitourinary: Negative.    Musculoskeletal:  Positive for arthralgias, myalgias, neck pain and neck stiffness.   Skin: Negative.    Allergic/Immunologic: Negative.    Neurological:  Positive for numbness (facial/head).   Psychiatric/Behavioral:  Positive for sleep disturbance.         Physical Examination:     Vitals:    12/15/23 0933   BP: 156/85   Pulse: 84   Weight: 96 kg (211 lb 9.6 oz)   Height: 195.6 cm (77\")   PainSc:   7        Physical Exam     Neurological Exam   Neurological examination appears stable compared to my last evaluation without any new red flag signs.    Result Review  The following data was reviewed by: Mat Gold MD on 12/15/2023:    Data reviewed : Radiologic studies MRI of the cervical spine does show adjacent segment disease at C4-C5.  Flexion-extension x-rays do not suggest dynamic spondylolisthesis.      Assessment/plan:  This is a 73-year-old gentleman with a prior anterior cervical disc fusion at C5-C6.  " He has adjacent segment disease at C4-C5.  He has neck and left arm pain.  I believe that the adjacent segment disease is contributing to it.  He has failed physical therapy.  He does not feel as though he is obtain profound relief with pain management interventions.  I think it is reasonable to consider a C4-C5 anterior cervical discectomy and fusion.  Unfortunately he will require extensive preoperative evaluation including clearance with cardiology with respect to antiplatelet and is discontinued Xarelto.  He will also require that his hemoglobin A1c be less than 8%.  Once these clearances have been established we will work to get him scheduled for surgical intervention.  I have encouraged him to engage with healthy activity and diet control.  I encouraged him to call with any questions or concerns.    Diagnoses and all orders for this visit:    1. Adjacent segment disease of cervical spine at C4-C5 level with history of fusion procedure (Primary)         No follow-ups on file.            Mat Gold MD

## 2023-12-15 ENCOUNTER — OFFICE VISIT (OUTPATIENT)
Dept: NEUROSURGERY | Facility: CLINIC | Age: 73
End: 2023-12-15
Payer: MEDICARE

## 2023-12-15 VITALS
SYSTOLIC BLOOD PRESSURE: 156 MMHG | BODY MASS INDEX: 24.98 KG/M2 | HEART RATE: 84 BPM | HEIGHT: 77 IN | WEIGHT: 211.6 LBS | DIASTOLIC BLOOD PRESSURE: 85 MMHG

## 2023-12-15 DIAGNOSIS — M50.321 ADJACENT SEGMENT DISEASE OF CERVICAL SPINE AT C4-C5 LEVEL WITH HISTORY OF FUSION PROCEDURE: Primary | ICD-10-CM

## 2023-12-15 DIAGNOSIS — Z98.1 ADJACENT SEGMENT DISEASE OF CERVICAL SPINE AT C4-C5 LEVEL WITH HISTORY OF FUSION PROCEDURE: Primary | ICD-10-CM

## 2023-12-18 ENCOUNTER — TELEPHONE (OUTPATIENT)
Dept: CARDIOLOGY | Facility: CLINIC | Age: 73
End: 2023-12-18
Payer: MEDICARE

## 2023-12-21 NOTE — TELEPHONE ENCOUNTER
Left patient message to call to schedule appointment with Dr Brown for cardiac clearance per Nina.

## 2023-12-26 ENCOUNTER — OFFICE VISIT (OUTPATIENT)
Dept: CARDIOLOGY | Facility: CLINIC | Age: 73
End: 2023-12-26
Payer: MEDICARE

## 2023-12-26 VITALS
OXYGEN SATURATION: 93 % | SYSTOLIC BLOOD PRESSURE: 116 MMHG | BODY MASS INDEX: 24.56 KG/M2 | HEIGHT: 77 IN | WEIGHT: 208 LBS | HEART RATE: 100 BPM | DIASTOLIC BLOOD PRESSURE: 76 MMHG

## 2023-12-26 DIAGNOSIS — E78.00 PURE HYPERCHOLESTEROLEMIA: ICD-10-CM

## 2023-12-26 DIAGNOSIS — I25.10 CORONARY ARTERY DISEASE INVOLVING NATIVE CORONARY ARTERY OF NATIVE HEART WITHOUT ANGINA PECTORIS: Primary | ICD-10-CM

## 2023-12-26 DIAGNOSIS — Z79.4 TYPE 2 DIABETES MELLITUS WITH DIABETIC POLYNEUROPATHY, WITH LONG-TERM CURRENT USE OF INSULIN: ICD-10-CM

## 2023-12-26 DIAGNOSIS — I10 PRIMARY HYPERTENSION: ICD-10-CM

## 2023-12-26 DIAGNOSIS — E11.42 TYPE 2 DIABETES MELLITUS WITH DIABETIC POLYNEUROPATHY, WITH LONG-TERM CURRENT USE OF INSULIN: ICD-10-CM

## 2023-12-26 DIAGNOSIS — Z01.810 PREOP CARDIOVASCULAR EXAM: ICD-10-CM

## 2023-12-26 RX ORDER — DULAGLUTIDE 1.5 MG/.5ML
INJECTION, SOLUTION SUBCUTANEOUS
COMMUNITY
Start: 2023-11-22

## 2023-12-26 NOTE — PROGRESS NOTES
Subjective:     Encounter Date:12/26/2023      Patient ID: Gurinder Amezcua is a 73 y.o. male.    Chief Complaint:  History of Present Illness 73-year-old white male with history of coronary disease status post stent placement to the LAD in the past history of hypertension hyperlipidemia diabetes and spine problems presents to my office for follow-up.  Patient does not have any symptoms of chest pain or shortness of breath at rest but he does not exert very well because of his back problems.  He needs C5-C6 spine surgery and anesthesia and needs a preop evaluation.  No complaint of any palpitation dizziness syncope or swelling of the feet.  He has been taking his medicines regularly but he does not smoke.    The following portions of the patient's history were reviewed and updated as appropriate: allergies, current medications, past family history, past medical history, past social history, past surgical history, and problem list.  Past Medical History:   Diagnosis Date    Ankylosing spondylitis of site in spine 1998    Spine surgery    Anxiety     Carotid artery occlusion     Cataracts, bilateral 04/2022    Cervical disc disorder     Chronic pain disorder     Deep vein thrombosis 2018 approx    none since    Depression 6/19    Death of wife    Diabetes mellitus 2017 approx    Type 2    Headache Always    Heart attack 06/17/2021    Low back pain     Lumbosacral disc disease     Migraine Unknown    Mixed hyperlipidemia 03/08/2018    Neck pain Feb 2023    Peripheral neuropathy     Rheumatoid arthritis Unknown    Shingles     Spinal stenosis     Stroke 07/21/2021    Thoracic disc disorder     Trigeminal neuralgia     Ureteral stone with hydronephrosis 11/24/2016     Past Surgical History:   Procedure Laterality Date    BACK SURGERY  2019    4 rods in back    CARDIAC CATHETERIZATION  06/17/2021    CORONARY STENT PLACEMENT  06/17/2021    CRANIOTOMY      ENDOSCOPY      EPIDURAL BLOCK      HERNIA REPAIR      KNEE ACL  "RECONSTRUCTION      KNEE ARTHROSCOPY      LAMINECTOMY      NECK SURGERY  Unknown    SPINAL CORD STIMULATOR IMPLANT  Previously    SPINAL FUSION  Unknown    SPINE SURGERY      TRIGEMINAL NERVE DECOMPRESSION      TRIGGER POINT INJECTION       /76 (BP Location: Left arm, Patient Position: Sitting, Cuff Size: Adult)   Pulse 100   Ht 195.6 cm (77\")   Wt 94.3 kg (208 lb)   SpO2 93%   BMI 24.67 kg/m²   Family History   Problem Relation Age of Onset    Diabetes Mother             Arthritis Mother     Heart attack Father             Aneurysm Father        Current Outpatient Medications:     aspirin 81 MG EC tablet, Take 1 tablet by mouth Daily., Disp: , Rfl:     atorvastatin (LIPITOR) 80 MG tablet, Take 1 tablet by mouth Daily., Disp: , Rfl:     B-D UF III MINI PEN NEEDLES 31G X 5 MM misc, TEST EVERY DAY, Disp: 100 each, Rfl: 1    Lancets (freestyle) lancets, FreeStyle Lancets, Disp: , Rfl:     metFORMIN ER (GLUCOPHAGE-XR) 750 MG 24 hr tablet, Take 2 tabs before breakfast., Disp: 180 tablet, Rfl: 3    pantoprazole (PROTONIX) 40 MG EC tablet, Take 1 tablet by mouth Daily., Disp: , Rfl:     Toujeo SoloStar 300 UNIT/ML solution pen-injector injection, Inject 30 Units under the skin into the appropriate area as directed Daily. Inject 25 units QHS, Disp: , Rfl:     Trulicity 1.5 MG/0.5ML solution pen-injector, INJECT 1.5 MG SUBCUTANEOUS ONE DAY A WEEK, Disp: , Rfl:   Allergies   Allergen Reactions    Adhesive Tape Itching     tegraderm patches causes itching    Methocarbamol Itching and Rash     Arms etc. No hives.       Pregabalin Confusion and Other (See Comments)     Hypnotic driving episode  Dizziness and inability to focus  Dizziness and inability to focus  Other reaction(s): Confusion  lyrica      Silver Rash     Social History     Socioeconomic History    Marital status:    Tobacco Use    Smoking status: Never     Passive exposure: Never    Smokeless tobacco: Never   Vaping Use    Vaping " Use: Never used   Substance and Sexual Activity    Alcohol use: Not Currently     Alcohol/week: 4.0 standard drinks of alcohol     Comment: or less per week    Drug use: Never    Sexual activity: Never     Review of Systems   Constitutional: Negative for malaise/fatigue.   Cardiovascular:  Negative for chest pain, dyspnea on exertion, leg swelling and palpitations.   Respiratory:  Negative for cough and shortness of breath.    Gastrointestinal:  Negative for abdominal pain, nausea and vomiting.   Neurological:  Negative for dizziness, focal weakness, headaches, light-headedness and numbness.   All other systems reviewed and are negative.             Objective:     Constitutional:       Appearance: Well-developed.   Eyes:      General: No scleral icterus.     Conjunctiva/sclera: Conjunctivae normal.   HENT:      Head: Normocephalic and atraumatic.   Neck:      Vascular: No carotid bruit or JVD.   Pulmonary:      Effort: Pulmonary effort is normal.      Breath sounds: Normal breath sounds. No wheezing. No rales.   Cardiovascular:      Normal rate. Regular rhythm.   Pulses:     Intact distal pulses.   Abdominal:      General: Bowel sounds are normal.      Palpations: Abdomen is soft.   Musculoskeletal:      Cervical back: Normal range of motion and neck supple. Skin:     General: Skin is warm and dry.      Findings: No rash.   Neurological:      Mental Status: Alert.       Procedures    Lab Review:         MDM    #1 coronary disease  Patient had stent placement to the LAD in the past and is currently having spine surgery for which he needs preop evaluation hence I will perform a stress Myoview study rule out ischemia  2.  Hypertension  Patient blood pressure currently stable on medications  3.  Hyperlipidemia  Patient is currently on atorvastatin the lipid levels are well within normal limits  4.  Diabetes  Patient is on metformin and followed by primary care doctor  5.  Preop evaluation  Patient needs preop evaluation  for neck surgery and needs a stress Myoview to rule out ischemia.    Patient's previous medical records, labs, and EKG were reviewed and discussed with the patient at today's visit.

## 2023-12-28 ENCOUNTER — HOSPITAL ENCOUNTER (OUTPATIENT)
Dept: CARDIOLOGY | Facility: HOSPITAL | Age: 73
Discharge: HOME | End: 2023-12-28
Payer: MEDICARE

## 2023-12-28 ENCOUNTER — TELEPHONE (OUTPATIENT)
Dept: NEUROSURGERY | Facility: CLINIC | Age: 73
End: 2023-12-28
Payer: MEDICARE

## 2023-12-28 DIAGNOSIS — Z79.4 TYPE 2 DIABETES MELLITUS WITH DIABETIC POLYNEUROPATHY, WITH LONG-TERM CURRENT USE OF INSULIN: Primary | ICD-10-CM

## 2023-12-28 DIAGNOSIS — Z01.818 PRE-OP TESTING: ICD-10-CM

## 2023-12-28 DIAGNOSIS — Z01.810 PREOP CARDIOVASCULAR EXAM: ICD-10-CM

## 2023-12-28 DIAGNOSIS — I25.10 CORONARY ARTERY DISEASE INVOLVING NATIVE CORONARY ARTERY OF NATIVE HEART WITHOUT ANGINA PECTORIS: ICD-10-CM

## 2023-12-28 DIAGNOSIS — I10 PRIMARY HYPERTENSION: ICD-10-CM

## 2023-12-28 DIAGNOSIS — E78.00 PURE HYPERCHOLESTEROLEMIA: ICD-10-CM

## 2023-12-28 DIAGNOSIS — Z79.4 TYPE 2 DIABETES MELLITUS WITH DIABETIC POLYNEUROPATHY, WITH LONG-TERM CURRENT USE OF INSULIN: ICD-10-CM

## 2023-12-28 DIAGNOSIS — E11.42 TYPE 2 DIABETES MELLITUS WITH DIABETIC POLYNEUROPATHY, WITH LONG-TERM CURRENT USE OF INSULIN: Primary | ICD-10-CM

## 2023-12-28 DIAGNOSIS — E11.42 TYPE 2 DIABETES MELLITUS WITH DIABETIC POLYNEUROPATHY, WITH LONG-TERM CURRENT USE OF INSULIN: ICD-10-CM

## 2023-12-28 DIAGNOSIS — I25.118 CORONARY ARTERY DISEASE OF NATIVE ARTERY OF NATIVE HEART WITH STABLE ANGINA PECTORIS: Primary | ICD-10-CM

## 2023-12-28 DIAGNOSIS — R94.39 ABNORMAL NUCLEAR STRESS TEST: ICD-10-CM

## 2023-12-28 LAB
BH CV REST NUCLEAR ISOTOPE DOSE: 11 MCI
BH CV STRESS COMMENTS STAGE 1: NORMAL
BH CV STRESS DOSE REGADENOSON STAGE 1: 0.4
BH CV STRESS DURATION MIN STAGE 1: 0
BH CV STRESS DURATION SEC STAGE 1: 10
BH CV STRESS NUCLEAR ISOTOPE DOSE: 32.7 MCI
BH CV STRESS PROTOCOL 1: NORMAL
BH CV STRESS RECOVERY BP: NORMAL MMHG
BH CV STRESS RECOVERY HR: 98 BPM
BH CV STRESS STAGE 1: 1
LV EF NUC BP: 49 %
MAXIMAL PREDICTED HEART RATE: 148 BPM
STRESS BASELINE BP: NORMAL MMHG
STRESS BASELINE HR: 77 BPM
STRESS TARGET HR: 126 BPM

## 2023-12-28 PROCEDURE — A9500 TC99M SESTAMIBI: HCPCS | Performed by: INTERNAL MEDICINE

## 2023-12-28 PROCEDURE — 0 TECHNETIUM SESTAMIBI: Performed by: INTERNAL MEDICINE

## 2023-12-28 PROCEDURE — 25010000002 REGADENOSON 0.4 MG/5ML SOLUTION: Performed by: INTERNAL MEDICINE

## 2023-12-28 PROCEDURE — 78452 HT MUSCLE IMAGE SPECT MULT: CPT

## 2023-12-28 PROCEDURE — 93017 CV STRESS TEST TRACING ONLY: CPT

## 2023-12-28 RX ORDER — REGADENOSON 0.08 MG/ML
0.4 INJECTION, SOLUTION INTRAVENOUS
Status: COMPLETED | OUTPATIENT
Start: 2023-12-28 | End: 2023-12-28

## 2023-12-28 RX ADMIN — TECHNETIUM TC 99M SESTAMIBI 1 DOSE: 1 INJECTION INTRAVENOUS at 14:04

## 2023-12-28 RX ADMIN — REGADENOSON 0.4 MG: 0.08 INJECTION, SOLUTION INTRAVENOUS at 14:04

## 2023-12-28 RX ADMIN — TECHNETIUM TC 99M SESTAMIBI 1 DOSE: 1 INJECTION INTRAVENOUS at 12:16

## 2023-12-28 NOTE — TELEPHONE ENCOUNTER
CALLED PATIENT TO LET HIM KNOW TO HAVE UPDTED A1C DONE IN 2 WKS AT [facility] AND IF IT IS 8 OR BELOW I WILL SCHEDULE HIM AT THAT TIME FOR SURGERY

## 2023-12-30 ENCOUNTER — LAB (OUTPATIENT)
Dept: LAB | Facility: HOSPITAL | Age: 73
End: 2023-12-30
Payer: MEDICARE

## 2023-12-30 DIAGNOSIS — R94.39 ABNORMAL NUCLEAR STRESS TEST: ICD-10-CM

## 2023-12-30 DIAGNOSIS — Z01.818 PRE-OP TESTING: ICD-10-CM

## 2023-12-30 DIAGNOSIS — I25.118 CORONARY ARTERY DISEASE OF NATIVE ARTERY OF NATIVE HEART WITH STABLE ANGINA PECTORIS: ICD-10-CM

## 2023-12-30 DIAGNOSIS — Z79.4 TYPE 2 DIABETES MELLITUS WITH DIABETIC POLYNEUROPATHY, WITH LONG-TERM CURRENT USE OF INSULIN: ICD-10-CM

## 2023-12-30 DIAGNOSIS — E11.42 TYPE 2 DIABETES MELLITUS WITH DIABETIC POLYNEUROPATHY, WITH LONG-TERM CURRENT USE OF INSULIN: ICD-10-CM

## 2023-12-30 LAB
ANION GAP SERPL CALCULATED.3IONS-SCNC: 10.8 MMOL/L (ref 5–15)
BASOPHILS # BLD AUTO: 0 10*3/MM3 (ref 0–0.2)
BASOPHILS NFR BLD AUTO: 0.3 % (ref 0–1.5)
BUN SERPL-MCNC: 14 MG/DL (ref 8–23)
BUN/CREAT SERPL: 15.6 (ref 7–25)
CALCIUM SPEC-SCNC: 9.2 MG/DL (ref 8.6–10.5)
CHLORIDE SERPL-SCNC: 103 MMOL/L (ref 98–107)
CHOLEST SERPL-MCNC: 117 MG/DL (ref 0–200)
CO2 SERPL-SCNC: 27.2 MMOL/L (ref 22–29)
CREAT SERPL-MCNC: 0.9 MG/DL (ref 0.76–1.27)
DEPRECATED RDW RBC AUTO: 47.7 FL (ref 37–54)
EGFRCR SERPLBLD CKD-EPI 2021: 90.7 ML/MIN/1.73
EOSINOPHIL # BLD AUTO: 0.1 10*3/MM3 (ref 0–0.4)
EOSINOPHIL NFR BLD AUTO: 2.6 % (ref 0.3–6.2)
ERYTHROCYTE [DISTWIDTH] IN BLOOD BY AUTOMATED COUNT: 14.1 % (ref 12.3–15.4)
GLUCOSE SERPL-MCNC: 149 MG/DL (ref 65–99)
HBA1C MFR BLD: 9.7 % (ref 4.8–5.6)
HCT VFR BLD AUTO: 40.6 % (ref 37.5–51)
HDLC SERPL-MCNC: 53 MG/DL (ref 40–60)
HGB BLD-MCNC: 13.7 G/DL (ref 13–17.7)
LDLC SERPL CALC-MCNC: 54 MG/DL (ref 0–100)
LDLC/HDLC SERPL: 1.06 {RATIO}
LYMPHOCYTES # BLD AUTO: 2.1 10*3/MM3 (ref 0.7–3.1)
LYMPHOCYTES NFR BLD AUTO: 51 % (ref 19.6–45.3)
MCH RBC QN AUTO: 31.2 PG (ref 26.6–33)
MCHC RBC AUTO-ENTMCNC: 33.8 G/DL (ref 31.5–35.7)
MCV RBC AUTO: 92.3 FL (ref 79–97)
MONOCYTES # BLD AUTO: 0.4 10*3/MM3 (ref 0.1–0.9)
MONOCYTES NFR BLD AUTO: 9.5 % (ref 5–12)
NEUTROPHILS NFR BLD AUTO: 1.5 10*3/MM3 (ref 1.7–7)
NEUTROPHILS NFR BLD AUTO: 36.6 % (ref 42.7–76)
NRBC BLD AUTO-RTO: 0.1 /100 WBC (ref 0–0.2)
PLATELET # BLD AUTO: 121 10*3/MM3 (ref 140–450)
PMV BLD AUTO: 9.1 FL (ref 6–12)
POTASSIUM SERPL-SCNC: 4.1 MMOL/L (ref 3.5–5.2)
RBC # BLD AUTO: 4.4 10*6/MM3 (ref 4.14–5.8)
SODIUM SERPL-SCNC: 141 MMOL/L (ref 136–145)
TRIGL SERPL-MCNC: 39 MG/DL (ref 0–150)
VLDLC SERPL-MCNC: 10 MG/DL (ref 5–40)
WBC NRBC COR # BLD AUTO: 4.2 10*3/MM3 (ref 3.4–10.8)

## 2023-12-30 PROCEDURE — 83036 HEMOGLOBIN GLYCOSYLATED A1C: CPT

## 2023-12-30 PROCEDURE — 80061 LIPID PANEL: CPT

## 2023-12-30 PROCEDURE — 36415 COLL VENOUS BLD VENIPUNCTURE: CPT

## 2023-12-30 PROCEDURE — 80048 BASIC METABOLIC PNL TOTAL CA: CPT

## 2023-12-30 PROCEDURE — 85025 COMPLETE CBC W/AUTO DIFF WBC: CPT

## 2024-01-02 ENCOUNTER — HOSPITAL ENCOUNTER (OUTPATIENT)
Facility: HOSPITAL | Age: 74
Setting detail: HOSPITAL OUTPATIENT SURGERY
Discharge: HOME | End: 2024-01-02
Attending: INTERNAL MEDICINE | Admitting: INTERNAL MEDICINE
Payer: MEDICARE

## 2024-01-02 VITALS
RESPIRATION RATE: 14 BRPM | OXYGEN SATURATION: 97 % | HEIGHT: 72 IN | DIASTOLIC BLOOD PRESSURE: 83 MMHG | BODY MASS INDEX: 28.07 KG/M2 | TEMPERATURE: 97.5 F | HEART RATE: 74 BPM | SYSTOLIC BLOOD PRESSURE: 128 MMHG | WEIGHT: 207.23 LBS

## 2024-01-02 DIAGNOSIS — R94.39 ABNORMAL NUCLEAR STRESS TEST: ICD-10-CM

## 2024-01-02 DIAGNOSIS — I25.118 CORONARY ARTERY DISEASE OF NATIVE ARTERY OF NATIVE HEART WITH STABLE ANGINA PECTORIS: ICD-10-CM

## 2024-01-02 LAB — GLUCOSE BLDC GLUCOMTR-MCNC: 163 MG/DL (ref 70–105)

## 2024-01-02 PROCEDURE — 25010000002 MIDAZOLAM PER 1 MG: Performed by: INTERNAL MEDICINE

## 2024-01-02 PROCEDURE — 25010000002 FENTANYL CITRATE (PF) 100 MCG/2ML SOLUTION: Performed by: INTERNAL MEDICINE

## 2024-01-02 PROCEDURE — C1769 GUIDE WIRE: HCPCS | Performed by: INTERNAL MEDICINE

## 2024-01-02 PROCEDURE — 25510000001 IOPAMIDOL PER 1 ML: Performed by: INTERNAL MEDICINE

## 2024-01-02 PROCEDURE — 25010000002 LIDOCAINE 1 % SOLUTION: Performed by: INTERNAL MEDICINE

## 2024-01-02 PROCEDURE — 99152 MOD SED SAME PHYS/QHP 5/>YRS: CPT | Performed by: INTERNAL MEDICINE

## 2024-01-02 PROCEDURE — 93458 L HRT ARTERY/VENTRICLE ANGIO: CPT | Performed by: INTERNAL MEDICINE

## 2024-01-02 PROCEDURE — 82948 REAGENT STRIP/BLOOD GLUCOSE: CPT

## 2024-01-02 PROCEDURE — C1894 INTRO/SHEATH, NON-LASER: HCPCS | Performed by: INTERNAL MEDICINE

## 2024-01-02 RX ORDER — FENTANYL CITRATE 50 UG/ML
INJECTION, SOLUTION INTRAMUSCULAR; INTRAVENOUS
Status: DISCONTINUED | OUTPATIENT
Start: 2024-01-02 | End: 2024-01-02 | Stop reason: HOSPADM

## 2024-01-02 RX ORDER — LIDOCAINE HYDROCHLORIDE 10 MG/ML
INJECTION, SOLUTION INFILTRATION; PERINEURAL
Status: DISCONTINUED | OUTPATIENT
Start: 2024-01-02 | End: 2024-01-02 | Stop reason: HOSPADM

## 2024-01-02 RX ORDER — NITROGLYCERIN 0.4 MG/1
0.4 TABLET SUBLINGUAL
Status: DISCONTINUED | OUTPATIENT
Start: 2024-01-02 | End: 2024-01-02 | Stop reason: HOSPADM

## 2024-01-02 RX ORDER — METOPROLOL SUCCINATE 25 MG/1
12.5 TABLET, EXTENDED RELEASE ORAL DAILY
Status: DISCONTINUED | OUTPATIENT
Start: 2024-01-02 | End: 2024-01-02 | Stop reason: HOSPADM

## 2024-01-02 RX ORDER — SODIUM CHLORIDE 9 MG/ML
250 INJECTION, SOLUTION INTRAVENOUS ONCE AS NEEDED
Status: DISCONTINUED | OUTPATIENT
Start: 2024-01-02 | End: 2024-01-02 | Stop reason: HOSPADM

## 2024-01-02 RX ORDER — MIDAZOLAM HYDROCHLORIDE 1 MG/ML
INJECTION INTRAMUSCULAR; INTRAVENOUS
Status: DISCONTINUED | OUTPATIENT
Start: 2024-01-02 | End: 2024-01-02 | Stop reason: HOSPADM

## 2024-01-02 RX ORDER — ACETAMINOPHEN 325 MG/1
650 TABLET ORAL EVERY 4 HOURS PRN
Status: DISCONTINUED | OUTPATIENT
Start: 2024-01-02 | End: 2024-01-02 | Stop reason: HOSPADM

## 2024-01-02 RX ORDER — SODIUM CHLORIDE 9 MG/ML
75 INJECTION, SOLUTION INTRAVENOUS CONTINUOUS
Status: DISCONTINUED | OUTPATIENT
Start: 2024-01-02 | End: 2024-01-02 | Stop reason: HOSPADM

## 2024-01-02 NOTE — DISCHARGE INSTRUCTIONS
Post Cath Instructions    Call Dr Brown's office to schedule appointment in 1 month.     Drink plenty of fluids for the next 24 hours.  This helps to eliminate the dye used in your procedure through urination.  You may resume a normal diet; however, try to avoid foods that would cause gas or constipation.    Sedative medication given to you during your catheterization may decrease your judgement and reaction time for up to 24-48 hours.  Therefore:  DO NOT drive or operate hazardous machinery (48 hours)  DO NOT consume alcoholic beverages  DO NOT make any important/legal decisions  Have someone stay with you for at least 24 hours    To allow proper healing and prevent bleeding, the following activities are to be strictly avoided for the next 24-48 hours:  Excessive bending at wound site  Straining (anything that would tense up muscles around the affected puncture site)  Lifting objects greater than 5 pounds, pushing, or pulling for 5 days  For Arm Cases:  No flexing at the puncture site, such as hammering, golfing, bowling, or swinging any objects  For Groin Cases:  Refrain from sexual activity  Refrain from running or vigorous walking  No prolonged sitting or standing  Limit stair climbing as much as possible    Keep the puncture site clean and dry.  You may remove the dressing tomorrow and replace it with a band-aid for at least one additional day.  Gently clean the site with mild soap and water.  No scrubbing/rubbing and lightly pat the area dry.  Showers are acceptable; however, avoid submerging in water (tub baths, hot tubs, swimming pools, dishwater, etc…) for at least one week.  The site should be completely healed before resuming these activities to reduce the risk of infection.  Check the site often.  Watch for signs and symptoms of infection and notify your physician if any of the following occur:  Bleeding or an increase in swelling at the puncture site  Fever  Increased soreness around puncture  site  Foul odor or significant drainage from the puncture site  Swelling, redness, or warmth at the puncture site    **A bruise or small “pea sized” lump under the skin at the puncture site is not unusual.  This should disappear within 3-4 weeks.**  CONTACT YOUR PHYSICIAN OR CALL 911 IF YOU EXPERIENCE ANY OF THE FOLLOWING:  Increased angina (chest pain) or frequent sensations of pressure, burning, pain, or other discomfort in the chest, arm, jaws, or stomach  Lightheadedness, dizziness, faint feeling, sweating, or difficulty breathing  Odd sensation changes like numbness, tingling, coldness, or pain in the arm or leg in which the catheter was inserted  Limb in which the catheter was inserted becomes pale/bluish in color    IMPORTANT:  Although this occurs very rarely, if you should develop bright red or excessive bleeding, feel a “pop” inside at the insertion site, or notice a sudden increase in swelling larger than a walnut, you should call 911.  Hold continuous firm pressure to the access site until emergency personnel arrive.  It is best if someone else can do this for you.

## 2024-01-02 NOTE — Clinical Note
PRE-SEDATION ASSESSMENT    CONSENT  Consent for procedure and sedation obtained: Yes    MEDICAL HISTORY  Significant medical/surgical history: No  Past Complications with Sedation/Anesthesia: No  Significant Family History: No  Smoking History: No  Alcohol/Drug abuse: No  Possible Pregnancy (LMP): Not Applicable  Cardiac History: No  Respiratory History: No    PHYSICAL EXAM  History and Physical Reviewed: H&P completed today  Airway Risk History: No previous history  Airway Anatomy : Class II  Heart : Normal  Lungs : Normal  LOC/Mental Status : Normal    OTHER FINDINGS  Reviewed current medications and allergies: Yes  Pertinent lab/diagnostic test reviewed: Yes    SEDATION RISK ASSESSMENT  Risk Status ASA: Class I - Normal, healthy patient  Plan for Sedation: Moderate Sedation  Indications for Procedure/Pre-Procedure Diagnosis and Planned Procedure: dysphagia  EKG Monitoring: Yes    NARRATIVE FINDINGS      removed.

## 2024-01-05 ENCOUNTER — TELEPHONE (OUTPATIENT)
Dept: NEUROSURGERY | Facility: CLINIC | Age: 74
End: 2024-01-05
Payer: MEDICARE

## 2024-01-05 NOTE — TELEPHONE ENCOUNTER
".Caller: Gurinder Amezcua \"Abimael\"    Relationship to patient: Self    Best call back number: [phone]      Type of visit: SURGERY    Requested date: ASAP     Additional notes:PT WANTS TO KNOW WHEN HE CAN HAVE SURGERY.     PLEASE CALL TO ADVISE    THANK YOU         "

## 2024-01-08 DIAGNOSIS — E11.42 TYPE 2 DIABETES MELLITUS WITH DIABETIC POLYNEUROPATHY, WITH LONG-TERM CURRENT USE OF INSULIN: ICD-10-CM

## 2024-01-08 DIAGNOSIS — Z79.4 TYPE 2 DIABETES MELLITUS WITH DIABETIC POLYNEUROPATHY, WITH LONG-TERM CURRENT USE OF INSULIN: ICD-10-CM

## 2024-01-08 DIAGNOSIS — Z01.818 PRE-OP TESTING: Primary | ICD-10-CM

## 2024-01-08 NOTE — TELEPHONE ENCOUNTER
RETURNED CALL TO PATIENT LET HIM KNOW A1C IS STILL ABOVE 8 AND HE WILL NEED TO RECHECK IN 3 WEEKS, ONCE THIS AT 8 OR BELOW WE CAN SCHEDULE HIS SURGERY. ADVISED PATIENT TO CONTACT DIABETES MANAGING PROVIDER TO DISCUSS CARE.

## 2024-02-05 ENCOUNTER — OFFICE VISIT (OUTPATIENT)
Dept: CARDIOLOGY | Facility: CLINIC | Age: 74
End: 2024-02-05
Payer: MEDICARE

## 2024-02-05 VITALS
SYSTOLIC BLOOD PRESSURE: 135 MMHG | HEART RATE: 80 BPM | OXYGEN SATURATION: 98 % | WEIGHT: 213 LBS | DIASTOLIC BLOOD PRESSURE: 79 MMHG | BODY MASS INDEX: 29.82 KG/M2 | HEIGHT: 71 IN

## 2024-02-05 DIAGNOSIS — E78.00 PURE HYPERCHOLESTEROLEMIA: ICD-10-CM

## 2024-02-05 DIAGNOSIS — E11.42 TYPE 2 DIABETES MELLITUS WITH DIABETIC POLYNEUROPATHY, WITH LONG-TERM CURRENT USE OF INSULIN: ICD-10-CM

## 2024-02-05 DIAGNOSIS — I25.118 CORONARY ARTERY DISEASE OF NATIVE ARTERY OF NATIVE HEART WITH STABLE ANGINA PECTORIS: Primary | ICD-10-CM

## 2024-02-05 DIAGNOSIS — Z79.4 TYPE 2 DIABETES MELLITUS WITH DIABETIC POLYNEUROPATHY, WITH LONG-TERM CURRENT USE OF INSULIN: ICD-10-CM

## 2024-02-05 DIAGNOSIS — I10 PRIMARY HYPERTENSION: ICD-10-CM

## 2024-02-05 RX ORDER — METOPROLOL SUCCINATE 25 MG/1
25 TABLET, EXTENDED RELEASE ORAL DAILY
Qty: 90 TABLET | Refills: 3 | Status: SHIPPED | OUTPATIENT
Start: 2024-02-05

## 2024-02-05 NOTE — PROGRESS NOTES
Subjective:     Encounter Date:02/05/2024      Patient ID: Gurinder Amezcua is a 73 y.o. male.    Chief Complaint:  Coronary Artery Disease  Pertinent negatives include no chest pain, dizziness, leg swelling, palpitations or shortness of breath.   71-year-old white male with history of coronary disease status post stent placement to circumflex artery history of diabetes hypertension hyperlipidemia presents to the office for a follow-up.  Patient is currently still without any symptoms of chest pain or shortness of breath at rest or exertion.  No complaint any PND orthopnea.  No palpitation dizziness syncope or swelling of the feet.  He is taking his medicines regularly.  He does not smoke.    The following portions of the patient's history were reviewed and updated as appropriate: allergies, current medications, past family history, past medical history, past social history, past surgical history, and problem list.  Past Medical History:   Diagnosis Date    Ankylosing spondylitis of site in spine 1998    Spine surgery    Anxiety     Carotid artery occlusion     Cataracts, bilateral 04/2022    Cervical disc disorder     Chronic pain disorder     Coronary artery disease of native artery of native heart with stable angina pectoris 12/28/2023    Deep vein thrombosis 2018 approx    none since    Depression 6/19    Death of wife    Diabetes mellitus 2017 approx    Type 2    Headache Always    Heart attack 06/17/2021    Low back pain     Lumbosacral disc disease     Migraine Unknown    Mixed hyperlipidemia 03/08/2018    Neck pain Feb 2023    Peripheral neuropathy     Rheumatoid arthritis Unknown    Shingles     Spinal stenosis     Thoracic disc disorder     Trigeminal neuralgia     Ureteral stone with hydronephrosis 11/24/2016     Past Surgical History:   Procedure Laterality Date    BACK SURGERY  2019    4 rods in back    CARDIAC CATHETERIZATION  06/17/2021    CARDIAC CATHETERIZATION N/A 1/2/2024    Procedure: Left  "Heart Cath with angiogram;  Surgeon: Akin Brown MD;  Location: [facility] CATH INVASIVE LOCATION;  Service: Cardiovascular;  Laterality: N/A;    CARDIAC CATHETERIZATION N/A 2024    Procedure: Left ventriculography;  Surgeon: Akin Brown MD;  Location: [facility] CATH INVASIVE LOCATION;  Service: Cardiovascular;  Laterality: N/A;    CORONARY STENT PLACEMENT  2021    CRANIOTOMY      ENDOSCOPY      EPIDURAL BLOCK      HERNIA REPAIR      KNEE ARTHROSCOPY      LAMINECTOMY      NECK SURGERY  Unknown    SPINAL CORD STIMULATOR IMPLANT  Previously    SPINAL FUSION  Unknown    SPINE SURGERY      TRIGEMINAL NERVE DECOMPRESSION      TRIGGER POINT INJECTION       /79   Pulse 80   Ht 181.6 cm (71.5\")   Wt 96.6 kg (213 lb)   SpO2 98%   BMI 29.30 kg/m²   Family History   Problem Relation Age of Onset    Diabetes Mother             Arthritis Mother     Heart attack Father             Aneurysm Father        Current Outpatient Medications:     aspirin 81 MG EC tablet, Take 1 tablet by mouth Daily., Disp: , Rfl:     atorvastatin (LIPITOR) 80 MG tablet, Take 1 tablet by mouth Daily., Disp: , Rfl:     B-D UF III MINI PEN NEEDLES 31G X 5 MM misc, TEST EVERY DAY, Disp: 100 each, Rfl: 1    Lancets (freestyle) lancets, FreeStyle Lancets, Disp: , Rfl:     metFORMIN ER (GLUCOPHAGE-XR) 750 MG 24 hr tablet, Take 2 tabs before breakfast., Disp: 180 tablet, Rfl: 3    pantoprazole (PROTONIX) 40 MG EC tablet, Take 1 tablet by mouth Daily., Disp: , Rfl:     Toujeo SoloStar 300 UNIT/ML solution pen-injector injection, Inject 30 Units under the skin into the appropriate area as directed Daily. Inject 25 units QHS, Disp: , Rfl:   Allergies   Allergen Reactions    Adhesive Tape Itching     tegraderm patches causes itching    Methocarbamol Itching and Rash     Arms etc. No hives.       Pregabalin Confusion and Other (See Comments)     Hypnotic driving episode  Dizziness and inability to focus  Dizziness and inability to " focus  Other reaction(s): Confusion  lyrica      Silver Rash     Social History     Socioeconomic History    Marital status:    Tobacco Use    Smoking status: Never     Passive exposure: Never    Smokeless tobacco: Never   Vaping Use    Vaping Use: Never used   Substance and Sexual Activity    Alcohol use: Not Currently     Alcohol/week: 4.0 standard drinks of alcohol     Comment: or less per week    Drug use: Never    Sexual activity: Never     Review of Systems   Constitutional: Negative for malaise/fatigue.   Cardiovascular:  Negative for chest pain, dyspnea on exertion, leg swelling and palpitations.   Respiratory:  Negative for cough and shortness of breath.    Gastrointestinal:  Negative for abdominal pain, nausea and vomiting.   Neurological:  Negative for dizziness, focal weakness, headaches, light-headedness and numbness.   All other systems reviewed and are negative.             Objective:     Constitutional:       Appearance: Well-developed.   Eyes:      General: No scleral icterus.     Conjunctiva/sclera: Conjunctivae normal.      Pupils: Pupils are equal, round, and reactive to light.   HENT:      Head: Normocephalic and atraumatic.   Neck:      Vascular: No carotid bruit or JVD.   Pulmonary:      Effort: Pulmonary effort is normal.      Breath sounds: Normal breath sounds. No wheezing. No rales.   Cardiovascular:      Normal rate. Regular rhythm.   Pulses:     Intact distal pulses.   Abdominal:      General: Bowel sounds are normal.      Palpations: Abdomen is soft.   Musculoskeletal: Normal range of motion.      Cervical back: Normal range of motion and neck supple. Skin:     General: Skin is warm and dry.      Findings: No rash.   Neurological:      Mental Status: Alert.      Comments: No focal deficits       Procedures    Lab Review:         MDM    #1 coronary disease  Patient had a stent placement to the circumflex artery and has a significant dizziness small ramus intermedius branch and  nonobstructive disease in other arteries and hence he is currently on medical therapy only  2.  Hypertension  Patient's blood pressure is currently slightly high and I will place him on Imdur 30 mg or Toprol-XL 25 mg once a day  3.  Hyperlipidemia  Patient is currently on statins and the lipid levels are followed by primary care doctor  4.  Diabetes  Patient is currently on oral medicines and insulin and followed by the primary care doctor.    Patient's previous medical records, labs, and EKG were reviewed and discussed with the patient at today's visit.

## 2024-03-04 NOTE — PROGRESS NOTES
Neurosurgical Consultation      Gurinder Amezcua is a 74 y.o. male is here today for follow-up for neck pain. Today patient reports left sided neck pain.    Chief Complaint   Patient presents with    Neck Pain        Previous treatment:Physical Therapy, CAMELIA,MBB, Prior Lumbar surgeries as well as C5-C6 anterior fusion, Hydrocodone     HPI: This is a 74-year-old gentleman who I last evaluated on December 15, 2023.  He has a history of relatively poorly controlled diabetes with a most recent hemoglobin A1c of 9.3%.  He is actively working quite aggressively to try to lower this however he has significant difficulties with the financial aspects of being able to fully engage with management.  He has a history of a C5-C6 anterior cervical disc fusion.  He has failed physical therapy.  He does get some relief with injections in his cervical spine with respect to his neck pain.  He does appear to have adjacent segment disease at C4-C5.  He also does have a right-sided eccentric disc bulge at C3-C4.  He has multiple comorbidities and was started on Xarelto for deep venous thrombosis but elected to discontinue that.    Past Medical History:   Diagnosis Date    Ankylosing spondylitis of site in spine 1998    Spine surgery    Anxiety     Carotid artery occlusion     Cataracts, bilateral 04/2022    Cervical disc disorder     Chronic pain disorder     Coronary artery disease of native artery of native heart with stable angina pectoris 12/28/2023    Deep vein thrombosis 2018 approx    none since    Depression 6/19    Death of wife    Diabetes mellitus 2017 approx    Type 2    Headache Always    Heart attack 06/17/2021    Low back pain     Lumbosacral disc disease     Migraine Unknown    Mixed hyperlipidemia 03/08/2018    Neck pain Feb 2023    Peripheral neuropathy     Rheumatoid arthritis Unknown    Shingles     Spinal stenosis     Thoracic disc disorder     Trigeminal neuralgia     Ureteral stone with hydronephrosis 11/24/2016         Past Surgical History:   Procedure Laterality Date    BACK SURGERY  2019    4 rods in back    CARDIAC CATHETERIZATION  06/17/2021    CARDIAC CATHETERIZATION N/A 1/2/2024    Procedure: Left Heart Cath with angiogram;  Surgeon: Akin Brown MD;  Location:  [facility] CATH INVASIVE LOCATION;  Service: Cardiovascular;  Laterality: N/A;    CARDIAC CATHETERIZATION N/A 1/2/2024    Procedure: Left ventriculography;  Surgeon: Akin Brown MD;  Location:  [facility] CATH INVASIVE LOCATION;  Service: Cardiovascular;  Laterality: N/A;    CORONARY STENT PLACEMENT  06/17/2021    CRANIOTOMY      ENDOSCOPY      EPIDURAL BLOCK      HERNIA REPAIR      KNEE ARTHROSCOPY      LAMINECTOMY      NECK SURGERY  Unknown    SPINAL CORD STIMULATOR IMPLANT  Previously    SPINAL FUSION  Unknown    SPINE SURGERY      TRIGEMINAL NERVE DECOMPRESSION      TRIGGER POINT INJECTION          Current Outpatient Medications on File Prior to Visit   Medication Sig Dispense Refill    aspirin 81 MG EC tablet Take 1 tablet by mouth Daily.      atorvastatin (LIPITOR) 80 MG tablet Take 1 tablet by mouth Daily.      B-D UF III MINI PEN NEEDLES 31G X 5 MM misc TEST EVERY  each 1    empagliflozin (Jardiance) 10 MG tablet tablet       Lancets (freestyle) lancets FreeStyle Lancets      Lyumjev KwikPen 100 UNIT/ML solution pen-injector INJECT 6 UNITS RIGHT BEFORE A MEAL THREE TIMES DAILY      metFORMIN ER (GLUCOPHAGE-XR) 750 MG 24 hr tablet Take 2 tabs before breakfast. 180 tablet 3    metoprolol succinate XL (TOPROL-XL) 25 MG 24 hr tablet Take 1 tablet by mouth Daily. 90 tablet 3    pantoprazole (PROTONIX) 40 MG EC tablet Take 1 tablet by mouth Daily.      Touradha SoloStar 300 UNIT/ML solution pen-injector injection Inject 30 Units under the skin into the appropriate area as directed Daily. Inject 25 units QHS       No current facility-administered medications on file prior to visit.        Allergies   Allergen Reactions    Adhesive Tape Itching     tegraderm  "patches causes itching    Methocarbamol Itching and Rash     Arms etc. No hives.       Pregabalin Confusion and Other (See Comments)     Hypnotic driving episode  Dizziness and inability to focus  Dizziness and inability to focus  Other reaction(s): Confusion  lyrica      Silver Rash        Social History     Socioeconomic History    Marital status:    Tobacco Use    Smoking status: Never     Passive exposure: Never    Smokeless tobacco: Never   Vaping Use    Vaping status: Never Used   Substance and Sexual Activity    Alcohol use: Not Currently     Alcohol/week: 4.0 standard drinks of alcohol     Comment: or less per week    Drug use: Never    Sexual activity: Never          Review of Systems   Constitutional:  Positive for activity change.   HENT: Negative.     Eyes: Negative.    Respiratory: Negative.     Cardiovascular: Negative.    Gastrointestinal: Negative.    Endocrine: Negative.    Genitourinary: Negative.    Musculoskeletal:  Positive for arthralgias, myalgias, neck pain and neck stiffness.   Skin: Negative.    Allergic/Immunologic: Negative.    Neurological:  Positive for numbness (right sided facial) and headache.        Balance issues   Hematological: Negative.    Psychiatric/Behavioral:  Positive for sleep disturbance.         Physical Examination:     Vitals:    03/08/24 1014   BP: 127/80   Pulse: 84   Weight: 100 kg (221 lb)   Height: 181.6 cm (71.5\")   PainSc:   8        Physical Exam     Neurological Exam   Neurological examination appears stable compared to my last evaluation.  I do not appreciate any new red flag signs.    Result Review  The following data was reviewed by: Mat Gold MD on 03/08/2024:    Data reviewed : Radiologic studies MRI of the cervical spine shows adjacent segment disease with disc bulging causing central and lateral recess stenosis at C4-C5.  There is also right-sided eccentric C3-C4 disc bulge.  There is no dynamic spondylolisthesis on flexion and extension.   "      Assessment/plan:  This is a 74-year-old gentleman with multiple significant comorbidities including poorly controlled diabetes.  I believe he does have adjacent segment disease that is contributing to neck pain as well as possible left-sided cervical radiculopathy.  He has failed medication management and physical therapy.  He does get some relief with injections.  I believe he is a good neurosurgical candidate for a anterior cervical discectomy and fusion at C4-C5 and potentially C3-C4 however he has significant difficulties controlling his diabetes and his most recent hemoglobin A1c is 9.3%.  He has financial difficulties in order to fully engage with treatment of this.  I recommend that the healthcare industry take better care of this individual and assist him in being able to better control his health so that he may be able to benefit from neurosurgical intervention.  I will have him continue to work with his other providers and return to see me in approximately 1 month.  I have encouraged him to call with any questions or concerns.    Diagnoses and all orders for this visit:    1. Adjacent segment disease of cervical spine at C4-C5 level with history of fusion procedure (Primary)         Return in about 1 month (around 4/8/2024).            Mat Gold MD

## 2024-03-08 ENCOUNTER — OFFICE VISIT (OUTPATIENT)
Dept: NEUROSURGERY | Facility: CLINIC | Age: 74
End: 2024-03-08
Payer: MEDICARE

## 2024-03-08 VITALS
WEIGHT: 221 LBS | SYSTOLIC BLOOD PRESSURE: 127 MMHG | HEART RATE: 84 BPM | HEIGHT: 72 IN | DIASTOLIC BLOOD PRESSURE: 80 MMHG | BODY MASS INDEX: 29.93 KG/M2

## 2024-03-08 DIAGNOSIS — M50.321 ADJACENT SEGMENT DISEASE OF CERVICAL SPINE AT C4-C5 LEVEL WITH HISTORY OF FUSION PROCEDURE: Primary | ICD-10-CM

## 2024-03-08 DIAGNOSIS — Z98.1 ADJACENT SEGMENT DISEASE OF CERVICAL SPINE AT C4-C5 LEVEL WITH HISTORY OF FUSION PROCEDURE: Primary | ICD-10-CM

## 2024-03-08 RX ORDER — INSULIN LISPRO-AABC 100 [IU]/ML
INJECTION, SOLUTION SUBCUTANEOUS
COMMUNITY
Start: 2024-02-23

## 2024-03-11 ENCOUNTER — OFFICE VISIT (OUTPATIENT)
Dept: CARDIOLOGY | Facility: CLINIC | Age: 74
End: 2024-03-11
Payer: MEDICARE

## 2024-03-11 VITALS
SYSTOLIC BLOOD PRESSURE: 108 MMHG | OXYGEN SATURATION: 95 % | HEIGHT: 71 IN | BODY MASS INDEX: 30.73 KG/M2 | WEIGHT: 219.5 LBS | HEART RATE: 80 BPM | DIASTOLIC BLOOD PRESSURE: 69 MMHG

## 2024-03-11 DIAGNOSIS — I10 PRIMARY HYPERTENSION: ICD-10-CM

## 2024-03-11 DIAGNOSIS — Z79.4 TYPE 2 DIABETES MELLITUS WITH DIABETIC POLYNEUROPATHY, WITH LONG-TERM CURRENT USE OF INSULIN: ICD-10-CM

## 2024-03-11 DIAGNOSIS — E78.00 PURE HYPERCHOLESTEROLEMIA: ICD-10-CM

## 2024-03-11 DIAGNOSIS — E11.42 TYPE 2 DIABETES MELLITUS WITH DIABETIC POLYNEUROPATHY, WITH LONG-TERM CURRENT USE OF INSULIN: ICD-10-CM

## 2024-03-11 DIAGNOSIS — I25.118 CORONARY ARTERY DISEASE OF NATIVE ARTERY OF NATIVE HEART WITH STABLE ANGINA PECTORIS: Primary | ICD-10-CM

## 2024-03-11 PROCEDURE — 1159F MED LIST DOCD IN RCRD: CPT | Performed by: INTERNAL MEDICINE

## 2024-03-11 PROCEDURE — 99214 OFFICE O/P EST MOD 30 MIN: CPT | Performed by: INTERNAL MEDICINE

## 2024-03-11 PROCEDURE — 1160F RVW MEDS BY RX/DR IN RCRD: CPT | Performed by: INTERNAL MEDICINE

## 2024-03-11 NOTE — PROGRESS NOTES
Subjective:     Encounter Date:03/11/2024      Patient ID: Gurinder Amezcua is a 74 y.o. male.    Chief Complaint:  History of Present Illness 74-year-old white male with history of coronary disease status post stent placement to the LAD history of hypertension hyperlipidemia diabetes presents to the office for follow-up.  Patient is currently stable without any symptoms of chest pain or shortness of breath at rest or exertion.  No complaint of any PND or orthopnea.  No palpitation dizziness syncope or swelling of the feet.  Patient has been taking all her medicines regularly.  Patient does not smoke    The following portions of the patient's history were reviewed and updated as appropriate: allergies, current medications, past family history, past medical history, past social history, past surgical history, and problem list.  Past Medical History:   Diagnosis Date    Ankylosing spondylitis of site in spine 1998    Spine surgery    Anxiety     Carotid artery occlusion     Cataracts, bilateral 04/2022    Cervical disc disorder     Chronic pain disorder     Coronary artery disease of native artery of native heart with stable angina pectoris 12/28/2023    Deep vein thrombosis 2018 approx    none since    Depression 6/19    Death of wife    Diabetes mellitus 2017 approx    Type 2    Headache Always    Heart attack 06/17/2021    Low back pain     Lumbosacral disc disease     Migraine Unknown    Mixed hyperlipidemia 03/08/2018    Neck pain Feb 2023    Peripheral neuropathy     Rheumatoid arthritis Unknown    Shingles     Spinal stenosis     Thoracic disc disorder     Trigeminal neuralgia     Ureteral stone with hydronephrosis 11/24/2016     Past Surgical History:   Procedure Laterality Date    BACK SURGERY  2019    4 rods in back    CARDIAC CATHETERIZATION  06/17/2021    CARDIAC CATHETERIZATION N/A 1/2/2024    Procedure: Left Heart Cath with angiogram;  Surgeon: Akin Brown MD;  Location: [facility] CATH INVASIVE  "LOCATION;  Service: Cardiovascular;  Laterality: N/A;    CARDIAC CATHETERIZATION N/A 2024    Procedure: Left ventriculography;  Surgeon: Akin Brown MD;  Location: [facility] CATH INVASIVE LOCATION;  Service: Cardiovascular;  Laterality: N/A;    CORONARY STENT PLACEMENT  2021    CRANIOTOMY      ENDOSCOPY      EPIDURAL BLOCK      HERNIA REPAIR      KNEE ARTHROSCOPY      LAMINECTOMY      NECK SURGERY  Unknown    SPINAL CORD STIMULATOR IMPLANT  Previously    SPINAL FUSION  Unknown    SPINE SURGERY      TRIGEMINAL NERVE DECOMPRESSION      TRIGGER POINT INJECTION       /69   Pulse 80   Ht 180.3 cm (71\")   Wt 99.6 kg (219 lb 8 oz)   SpO2 95%   BMI 30.61 kg/m²   Family History   Problem Relation Age of Onset    Diabetes Mother             Arthritis Mother     Heart attack Father             Aneurysm Father        Current Outpatient Medications:     aspirin 81 MG EC tablet, Take 1 tablet by mouth Daily., Disp: , Rfl:     atorvastatin (LIPITOR) 80 MG tablet, Take 1 tablet by mouth Daily., Disp: , Rfl:     B-D UF III MINI PEN NEEDLES 31G X 5 MM misc, TEST EVERY DAY, Disp: 100 each, Rfl: 1    empagliflozin (Jardiance) 10 MG tablet tablet, , Disp: , Rfl:     Lancets (freestyle) lancets, FreeStyle Lancets, Disp: , Rfl:     Lyumjev KwikPen 100 UNIT/ML solution pen-injector, INJECT 6 UNITS RIGHT BEFORE A MEAL THREE TIMES DAILY, Disp: , Rfl:     metFORMIN ER (GLUCOPHAGE-XR) 750 MG 24 hr tablet, Take 2 tabs before breakfast., Disp: 180 tablet, Rfl: 3    metoprolol succinate XL (TOPROL-XL) 25 MG 24 hr tablet, Take 1 tablet by mouth Daily., Disp: 90 tablet, Rfl: 3    pantoprazole (PROTONIX) 40 MG EC tablet, Take 1 tablet by mouth Daily., Disp: , Rfl:     Toujeo SoloStar 300 UNIT/ML solution pen-injector injection, Inject 30 Units under the skin into the appropriate area as directed Daily. Inject 25 units QHS, Disp: , Rfl:   Allergies   Allergen Reactions    Adhesive Tape Itching     tegraderm " patches causes itching    Methocarbamol Itching and Rash     Arms etc. No hives.       Pregabalin Confusion and Other (See Comments)     Hypnotic driving episode  Dizziness and inability to focus  Dizziness and inability to focus  Other reaction(s): Confusion  lyrica      Silver Rash     Social History     Socioeconomic History    Marital status:    Tobacco Use    Smoking status: Never     Passive exposure: Never    Smokeless tobacco: Never   Vaping Use    Vaping status: Never Used   Substance and Sexual Activity    Alcohol use: Not Currently     Alcohol/week: 4.0 standard drinks of alcohol     Comment: or less per week    Drug use: Never    Sexual activity: Never     Review of Systems   Constitutional: Negative for malaise/fatigue.   Cardiovascular:  Negative for chest pain, dyspnea on exertion, leg swelling and palpitations.   Respiratory:  Negative for cough and shortness of breath.    Gastrointestinal:  Negative for abdominal pain, nausea and vomiting.   Neurological:  Negative for dizziness, focal weakness, headaches, light-headedness and numbness.   All other systems reviewed and are negative.             Objective:     Constitutional:       Appearance: Well-developed.   Eyes:      General: No scleral icterus.     Conjunctiva/sclera: Conjunctivae normal.   HENT:      Head: Normocephalic and atraumatic.   Neck:      Vascular: No carotid bruit or JVD.   Pulmonary:      Effort: Pulmonary effort is normal.      Breath sounds: Normal breath sounds. No wheezing. No rales.   Cardiovascular:      Normal rate. Regular rhythm.   Pulses:     Intact distal pulses.   Abdominal:      General: Bowel sounds are normal.      Palpations: Abdomen is soft.   Musculoskeletal:      Cervical back: Normal range of motion and neck supple. Skin:     General: Skin is warm and dry.      Findings: No rash.   Neurological:      Mental Status: Alert.       Procedures    Lab Review:         MDM    #1 coronary disease  Patient had  stent placement to the LAD in the past and currently stable on medical therapy  Patient has normal LV systolic function  2.  Diabetes  Patient is on insulin and oral medicines and followed by the primary care doctor  3.  Hyperlipidemia  Patient is currently on atorvastatin the lipids are well within normal limits  4.  Hypertension  Patient blood pressure currently stable on metoprolol    Patient's previous medical records, labs, and EKG were reviewed and discussed with the patient at today's visit.

## 2024-03-17 NOTE — TELEPHONE ENCOUNTER
Monitor Summary   SR 61-65  (R) PVC   .20/.06/.41       Tried calling patient, could not leave VM  Please advise him to increase dose of lantus insulin gradually to compensate for any increase in the fasting blood sugar numbers and to maintain these in the 100-150 range.

## 2024-03-28 ENCOUNTER — DOCUMENTATION (OUTPATIENT)
Dept: PHYSICAL THERAPY | Facility: CLINIC | Age: 74
End: 2024-03-28
Payer: MEDICARE

## 2024-03-28 NOTE — PROGRESS NOTES
Discharge Summary  Discharge Summary from PhysicalTherapy    Patient: Gurinder Amezcua   : 1950  Diagnosis/ICD-10 Code: cervical radiculopathy    Today's Date: 3/28/2024    Patient seen for 10 visits.    Discharge Status of Patient: See 23 treatment note for detail.    Goals: Not Met    Discharge Plan: Patient to return to referring/providing physician    Comments:  pt stopped PT and wanted to follow up with neurosurgeon      Thank you for this referral to [facility]    SIGNATURE: Komal Medeiros PT

## 2024-04-09 PROCEDURE — 99284 EMERGENCY DEPT VISIT MOD MDM: CPT

## 2024-04-09 PROCEDURE — 36415 COLL VENOUS BLD VENIPUNCTURE: CPT

## 2024-04-10 ENCOUNTER — HOSPITAL ENCOUNTER (EMERGENCY)
Facility: HOSPITAL | Age: 74
Discharge: HOME | End: 2024-04-10
Attending: EMERGENCY MEDICINE | Admitting: EMERGENCY MEDICINE
Payer: COMMERCIAL

## 2024-04-10 ENCOUNTER — APPOINTMENT (OUTPATIENT)
Dept: CT IMAGING | Facility: HOSPITAL | Age: 74
End: 2024-04-10
Payer: COMMERCIAL

## 2024-04-10 VITALS
RESPIRATION RATE: 16 BRPM | HEART RATE: 67 BPM | DIASTOLIC BLOOD PRESSURE: 90 MMHG | WEIGHT: 219.58 LBS | HEIGHT: 71 IN | TEMPERATURE: 98.8 F | OXYGEN SATURATION: 93 % | SYSTOLIC BLOOD PRESSURE: 145 MMHG | BODY MASS INDEX: 30.74 KG/M2

## 2024-04-10 DIAGNOSIS — M54.2 NECK PAIN: ICD-10-CM

## 2024-04-10 DIAGNOSIS — M54.50 ACUTE LOW BACK PAIN, UNSPECIFIED BACK PAIN LATERALITY, UNSPECIFIED WHETHER SCIATICA PRESENT: Primary | ICD-10-CM
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
## 2024-04-10 LAB
ALBUMIN SERPL-MCNC: 3.9 G/DL (ref 3.5–5.2)
ALBUMIN/GLOB SERPL: 1.6 G/DL
ALP SERPL-CCNC: 100 U/L (ref 39–117)
ALT SERPL W P-5'-P-CCNC: 28 U/L (ref 1–41)
ANION GAP SERPL CALCULATED.3IONS-SCNC: 12 MMOL/L (ref 5–15)
AST SERPL-CCNC: 31 U/L (ref 1–40)
BASOPHILS # BLD AUTO: 0.02 10*3/MM3 (ref 0–0.2)
BASOPHILS NFR BLD AUTO: 0.5 % (ref 0–1.5)
BILIRUB SERPL-MCNC: 0.3 MG/DL (ref 0–1.2)
BUN SERPL-MCNC: 18 MG/DL (ref 8–23)
BUN/CREAT SERPL: 20 (ref 7–25)
CALCIUM SPEC-SCNC: 9.3 MG/DL (ref 8.6–10.5)
CHLORIDE SERPL-SCNC: 105 MMOL/L (ref 98–107)
CO2 SERPL-SCNC: 22 MMOL/L (ref 22–29)
CREAT SERPL-MCNC: 0.9 MG/DL (ref 0.76–1.27)
DEPRECATED RDW RBC AUTO: 44.4 FL (ref 37–54)
EGFRCR SERPLBLD CKD-EPI 2021: 90.2 ML/MIN/1.73
EOSINOPHIL # BLD AUTO: 0.28 10*3/MM3 (ref 0–0.4)
EOSINOPHIL NFR BLD AUTO: 6.9 % (ref 0.3–6.2)
ERYTHROCYTE [DISTWIDTH] IN BLOOD BY AUTOMATED COUNT: 12.8 % (ref 12.3–15.4)
GLOBULIN UR ELPH-MCNC: 2.5 GM/DL
GLUCOSE SERPL-MCNC: 140 MG/DL (ref 65–99)
HCT VFR BLD AUTO: 39.9 % (ref 37.5–51)
HGB BLD-MCNC: 12.9 G/DL (ref 13–17.7)
IMM GRANULOCYTES # BLD AUTO: 0.01 10*3/MM3 (ref 0–0.05)
IMM GRANULOCYTES NFR BLD AUTO: 0.2 % (ref 0–0.5)
LIPASE SERPL-CCNC: 54 U/L (ref 13–60)
LYMPHOCYTES # BLD AUTO: 1.42 10*3/MM3 (ref 0.7–3.1)
LYMPHOCYTES NFR BLD AUTO: 35.2 % (ref 19.6–45.3)
MCH RBC QN AUTO: 30.6 PG (ref 26.6–33)
MCHC RBC AUTO-ENTMCNC: 32.3 G/DL (ref 31.5–35.7)
MCV RBC AUTO: 94.5 FL (ref 79–97)
MONOCYTES # BLD AUTO: 0.52 10*3/MM3 (ref 0.1–0.9)
MONOCYTES NFR BLD AUTO: 12.9 % (ref 5–12)
NEUTROPHILS NFR BLD AUTO: 1.78 10*3/MM3 (ref 1.7–7)
NEUTROPHILS NFR BLD AUTO: 44.3 % (ref 42.7–76)
NRBC BLD AUTO-RTO: 0 /100 WBC (ref 0–0.2)
PLATELET # BLD AUTO: 135 10*3/MM3 (ref 140–450)
PMV BLD AUTO: 10.2 FL (ref 6–12)
POTASSIUM SERPL-SCNC: 3.8 MMOL/L (ref 3.5–5.2)
PROT SERPL-MCNC: 6.4 G/DL (ref 6–8.5)
RBC # BLD AUTO: 4.22 10*6/MM3 (ref 4.14–5.8)
SODIUM SERPL-SCNC: 139 MMOL/L (ref 136–145)
WBC NRBC COR # BLD AUTO: 4.03 10*3/MM3 (ref 3.4–10.8)

## 2024-04-10 PROCEDURE — 85025 COMPLETE CBC W/AUTO DIFF WBC: CPT | Performed by: EMERGENCY MEDICINE

## 2024-04-10 PROCEDURE — 70450 CT HEAD/BRAIN W/O DYE: CPT

## 2024-04-10 PROCEDURE — 74176 CT ABD & PELVIS W/O CONTRAST: CPT

## 2024-04-10 PROCEDURE — 36415 COLL VENOUS BLD VENIPUNCTURE: CPT

## 2024-04-10 PROCEDURE — 83690 ASSAY OF LIPASE: CPT | Performed by: EMERGENCY MEDICINE

## 2024-04-10 PROCEDURE — 72125 CT NECK SPINE W/O DYE: CPT

## 2024-04-10 PROCEDURE — 80053 COMPREHEN METABOLIC PANEL: CPT | Performed by: EMERGENCY MEDICINE

## 2024-04-10 RX ORDER — SODIUM CHLORIDE 0.9 % (FLUSH) 0.9 %
10 SYRINGE (ML) INJECTION AS NEEDED
Status: DISCONTINUED | OUTPATIENT
Start: 2024-04-10 | End: 2024-04-10 | Stop reason: HOSPADM

## 2024-04-10 RX ORDER — CYCLOBENZAPRINE HCL 10 MG
10 TABLET ORAL 3 TIMES DAILY PRN
Qty: 15 TABLET | Refills: 0 | Status: SHIPPED | OUTPATIENT
Start: 2024-04-10
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
## 2024-04-11 ENCOUNTER — TELEPHONE (OUTPATIENT)
Dept: NEUROSURGERY | Facility: CLINIC | Age: 74
End: 2024-04-11
Payer: COMMERCIAL

## 2024-04-11 DIAGNOSIS — E11.42 TYPE 2 DIABETES MELLITUS WITH DIABETIC POLYNEUROPATHY, WITH LONG-TERM CURRENT USE OF INSULIN: ICD-10-CM

## 2024-04-11 DIAGNOSIS — Z79.4 TYPE 2 DIABETES MELLITUS WITH DIABETIC POLYNEUROPATHY, WITH LONG-TERM CURRENT USE OF INSULIN: ICD-10-CM

## 2024-04-11 DIAGNOSIS — Z01.818 PRE-OP TESTING: ICD-10-CM

## 2024-04-11 RX ORDER — IBUPROFEN 800 MG/1
800 TABLET ORAL 3 TIMES DAILY PRN
COMMUNITY
Start: 2024-04-08

## 2024-04-11 RX ORDER — CHLORHEXIDINE GLUCONATE ORAL RINSE 1.2 MG/ML
SOLUTION DENTAL
COMMUNITY
Start: 2024-04-08

## 2024-04-11 RX ORDER — AMOXICILLIN 500 MG/1
1 CAPSULE ORAL 3 TIMES DAILY
COMMUNITY
Start: 2024-04-08

## 2024-04-11 RX ORDER — BLOOD-GLUCOSE SENSOR
EACH MISCELLANEOUS
COMMUNITY
Start: 2024-03-15

## 2024-04-11 RX ORDER — METFORMIN HYDROCHLORIDE 500 MG/1
2 TABLET, EXTENDED RELEASE ORAL EVERY 12 HOURS SCHEDULED
COMMUNITY
Start: 2024-03-14

## 2024-04-11 NOTE — TELEPHONE ENCOUNTER
"Left message for patient to call the office.  \"PATIENT NEEDS TO COMPLETE THE A1c LABS THAT HAD BEEN ORDERED ON 1/8/24 \" HUB OK TO RELAY\"  "
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
## 2024-04-12 ENCOUNTER — OFFICE VISIT (OUTPATIENT)
Dept: NEUROSURGERY | Facility: CLINIC | Age: 74
End: 2024-04-12
Payer: MEDICARE

## 2024-04-12 VITALS
WEIGHT: 225 LBS | DIASTOLIC BLOOD PRESSURE: 99 MMHG | HEIGHT: 71 IN | BODY MASS INDEX: 31.5 KG/M2 | HEART RATE: 97 BPM | SYSTOLIC BLOOD PRESSURE: 152 MMHG

## 2024-04-12 DIAGNOSIS — Z98.1 ADJACENT SEGMENT DISEASE OF CERVICAL SPINE AT C4-C5 LEVEL WITH HISTORY OF FUSION PROCEDURE: Primary | ICD-10-CM

## 2024-04-12 DIAGNOSIS — M50.321 ADJACENT SEGMENT DISEASE OF CERVICAL SPINE AT C4-C5 LEVEL WITH HISTORY OF FUSION PROCEDURE: Primary | ICD-10-CM
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
## 2024-05-20 ENCOUNTER — PREP FOR SURGERY (OUTPATIENT)
Dept: OTHER | Facility: HOSPITAL | Age: 74
End: 2024-05-20
Payer: COMMERCIAL

## 2024-05-20 ENCOUNTER — OFFICE VISIT (OUTPATIENT)
Dept: NEUROSURGERY | Facility: CLINIC | Age: 74
End: 2024-05-20
Payer: MEDICARE

## 2024-05-20 VITALS
SYSTOLIC BLOOD PRESSURE: 137 MMHG | HEART RATE: 72 BPM | WEIGHT: 222.1 LBS | DIASTOLIC BLOOD PRESSURE: 89 MMHG | HEIGHT: 71 IN | BODY MASS INDEX: 31.09 KG/M2

## 2024-05-20 DIAGNOSIS — G50.0 TRIGEMINAL NEURALGIA: ICD-10-CM

## 2024-05-20 DIAGNOSIS — R79.1 ABNORMAL COAGULATION PROFILE: ICD-10-CM

## 2024-05-20 DIAGNOSIS — M48.02 CERVICAL STENOSIS OF SPINE: Primary | ICD-10-CM

## 2024-05-20 DIAGNOSIS — M54.12 CERVICAL RADICULOPATHY: ICD-10-CM

## 2024-05-20 DIAGNOSIS — R79.9 ABNORMAL FINDING OF BLOOD CHEMISTRY, UNSPECIFIED: ICD-10-CM

## 2024-05-20 DIAGNOSIS — G95.9 CERVICAL MYELOPATHY: ICD-10-CM

## 2024-05-20 DIAGNOSIS — M50.30 DDD (DEGENERATIVE DISC DISEASE), CERVICAL: ICD-10-CM

## 2024-05-20 DIAGNOSIS — G95.9 CERVICAL MYELOPATHY: Primary | ICD-10-CM

## 2024-05-20 PROCEDURE — 99214 OFFICE O/P EST MOD 30 MIN: CPT | Performed by: NEUROLOGICAL SURGERY

## 2024-05-20 PROCEDURE — 1160F RVW MEDS BY RX/DR IN RCRD: CPT | Performed by: NEUROLOGICAL SURGERY

## 2024-05-20 PROCEDURE — 1159F MED LIST DOCD IN RCRD: CPT | Performed by: NEUROLOGICAL SURGERY

## 2024-05-22 ENCOUNTER — TELEPHONE (OUTPATIENT)
Dept: NEUROSURGERY | Facility: CLINIC | Age: 74
End: 2024-05-22
Payer: COMMERCIAL

## 2024-05-22 ENCOUNTER — TELEPHONE (OUTPATIENT)
Dept: CARDIOLOGY | Facility: CLINIC | Age: 74
End: 2024-05-22
Payer: COMMERCIAL

## 2024-05-22 NOTE — TELEPHONE ENCOUNTER
Patient called and stated he can't get in with neurology until sept 19th at 8am and he stated he can't wait that long and is there anyway  that we can get him in to another place.

## 2024-05-22 NOTE — TELEPHONE ENCOUNTER
FACILITY: [facility]  DR: Bang Gomez  PHONE: [phone]  FAX: [phone]  PROCEDURE: Spine surgery  SCHEDULED: tbd  MEDS TO HOLD: asa

## 2024-05-23 NOTE — TELEPHONE ENCOUNTER
RETURNED CALL TO PATIENT LET HIM KNOW HE CAN CALL TO [facility] AND TO SEE IF HE CAN GET A SOONER APT PATIENT VERBALLY UNDERSTOOD

## 2024-05-24 PROBLEM — G95.9 CERVICAL MYELOPATHY: Status: ACTIVE | Noted: 2024-05-20

## 2024-05-28 ENCOUNTER — HOSPITAL ENCOUNTER (OUTPATIENT)
Dept: CARDIOLOGY | Facility: HOSPITAL | Age: 74
Discharge: HOME | End: 2024-05-28
Payer: MEDICARE

## 2024-05-28 ENCOUNTER — LAB (OUTPATIENT)
Dept: LAB | Facility: HOSPITAL | Age: 74
End: 2024-05-28
Payer: MEDICARE

## 2024-05-28 DIAGNOSIS — G95.9 CERVICAL MYELOPATHY: ICD-10-CM

## 2024-05-28 DIAGNOSIS — R79.1 ABNORMAL COAGULATION PROFILE: ICD-10-CM

## 2024-05-28 DIAGNOSIS — R79.9 ABNORMAL FINDING OF BLOOD CHEMISTRY, UNSPECIFIED: ICD-10-CM

## 2024-05-28 LAB
ABO GROUP BLD: NORMAL
ANION GAP SERPL CALCULATED.3IONS-SCNC: 11 MMOL/L (ref 5–15)
BASOPHILS # BLD AUTO: 0.04 10*3/MM3 (ref 0–0.2)
BASOPHILS NFR BLD AUTO: 1 % (ref 0–1.5)
BILIRUB UR QL STRIP: NEGATIVE
BLD GP AB SCN SERPL QL: NEGATIVE
BUN SERPL-MCNC: 19 MG/DL (ref 8–23)
BUN/CREAT SERPL: 20.2 (ref 7–25)
CALCIUM SPEC-SCNC: 9.2 MG/DL (ref 8.6–10.5)
CHLORIDE SERPL-SCNC: 105 MMOL/L (ref 98–107)
CLARITY UR: CLEAR
CO2 SERPL-SCNC: 26 MMOL/L (ref 22–29)
COLOR UR: YELLOW
CREAT SERPL-MCNC: 0.94 MG/DL (ref 0.76–1.27)
DEPRECATED RDW RBC AUTO: 43.1 FL (ref 37–54)
EGFRCR SERPLBLD CKD-EPI 2021: 85.6 ML/MIN/1.73
EOSINOPHIL # BLD AUTO: 0.15 10*3/MM3 (ref 0–0.4)
EOSINOPHIL NFR BLD AUTO: 3.6 % (ref 0.3–6.2)
ERYTHROCYTE [DISTWIDTH] IN BLOOD BY AUTOMATED COUNT: 13.2 % (ref 12.3–15.4)
GLUCOSE SERPL-MCNC: 120 MG/DL (ref 65–99)
GLUCOSE UR STRIP-MCNC: ABNORMAL MG/DL
HBA1C MFR BLD: 8.8 % (ref 4.8–5.6)
HCT VFR BLD AUTO: 40.7 % (ref 37.5–51)
HGB BLD-MCNC: 13.4 G/DL (ref 13–17.7)
HGB UR QL STRIP.AUTO: NEGATIVE
IMM GRANULOCYTES # BLD AUTO: 0.01 10*3/MM3 (ref 0–0.05)
IMM GRANULOCYTES NFR BLD AUTO: 0.2 % (ref 0–0.5)
INR PPP: 1.03 (ref 0.93–1.1)
KETONES UR QL STRIP: NEGATIVE
LEUKOCYTE ESTERASE UR QL STRIP.AUTO: NEGATIVE
LYMPHOCYTES # BLD AUTO: 1.66 10*3/MM3 (ref 0.7–3.1)
LYMPHOCYTES NFR BLD AUTO: 39.4 % (ref 19.6–45.3)
MCH RBC QN AUTO: 29.7 PG (ref 26.6–33)
MCHC RBC AUTO-ENTMCNC: 32.9 G/DL (ref 31.5–35.7)
MCV RBC AUTO: 90.2 FL (ref 79–97)
MONOCYTES # BLD AUTO: 0.47 10*3/MM3 (ref 0.1–0.9)
MONOCYTES NFR BLD AUTO: 11.2 % (ref 5–12)
MRSA DNA SPEC QL NAA+PROBE: NORMAL
NEUTROPHILS NFR BLD AUTO: 1.88 10*3/MM3 (ref 1.7–7)
NEUTROPHILS NFR BLD AUTO: 44.6 % (ref 42.7–76)
NITRITE UR QL STRIP: NEGATIVE
NRBC BLD AUTO-RTO: 0 /100 WBC (ref 0–0.2)
PH UR STRIP.AUTO: 6 [PH] (ref 5–8)
PLATELET # BLD AUTO: 134 10*3/MM3 (ref 140–450)
PMV BLD AUTO: 10.9 FL (ref 6–12)
POTASSIUM SERPL-SCNC: 3.7 MMOL/L (ref 3.5–5.2)
PROT UR QL STRIP: NEGATIVE
PROTHROMBIN TIME: 11.2 SECONDS (ref 9.6–11.7)
QT INTERVAL: 375 MS
QTC INTERVAL: 419 MS
RBC # BLD AUTO: 4.51 10*6/MM3 (ref 4.14–5.8)
RH BLD: NEGATIVE
SODIUM SERPL-SCNC: 142 MMOL/L (ref 136–145)
SP GR UR STRIP: >1.03 (ref 1–1.03)
T&S EXPIRATION DATE: NORMAL
UROBILINOGEN UR QL STRIP: ABNORMAL
WBC NRBC COR # BLD AUTO: 4.21 10*3/MM3 (ref 3.4–10.8)

## 2024-05-28 PROCEDURE — 36415 COLL VENOUS BLD VENIPUNCTURE: CPT

## 2024-05-28 PROCEDURE — 81003 URINALYSIS AUTO W/O SCOPE: CPT

## 2024-05-28 PROCEDURE — 86901 BLOOD TYPING SEROLOGIC RH(D): CPT | Performed by: NEUROLOGICAL SURGERY

## 2024-05-28 PROCEDURE — 85610 PROTHROMBIN TIME: CPT

## 2024-05-28 PROCEDURE — 93005 ELECTROCARDIOGRAM TRACING: CPT | Performed by: NEUROLOGICAL SURGERY

## 2024-05-28 PROCEDURE — 86900 BLOOD TYPING SEROLOGIC ABO: CPT | Performed by: NEUROLOGICAL SURGERY

## 2024-05-28 PROCEDURE — 87641 MR-STAPH DNA AMP PROBE: CPT

## 2024-05-28 PROCEDURE — 86901 BLOOD TYPING SEROLOGIC RH(D): CPT

## 2024-05-28 PROCEDURE — 86900 BLOOD TYPING SEROLOGIC ABO: CPT

## 2024-05-28 PROCEDURE — 80048 BASIC METABOLIC PNL TOTAL CA: CPT

## 2024-05-28 PROCEDURE — 86850 RBC ANTIBODY SCREEN: CPT | Performed by: NEUROLOGICAL SURGERY

## 2024-05-28 PROCEDURE — 85025 COMPLETE CBC W/AUTO DIFF WBC: CPT

## 2024-05-28 PROCEDURE — 83036 HEMOGLOBIN GLYCOSYLATED A1C: CPT

## 2024-06-06 ENCOUNTER — ANESTHESIA EVENT (OUTPATIENT)
Dept: PERIOP | Facility: HOSPITAL | Age: 74
End: 2024-06-06
Payer: MEDICARE

## 2024-06-06 ENCOUNTER — ANESTHESIA (OUTPATIENT)
Dept: PERIOP | Facility: HOSPITAL | Age: 74
End: 2024-06-06
Payer: MEDICARE

## 2024-06-06 ENCOUNTER — APPOINTMENT (OUTPATIENT)
Dept: GENERAL RADIOLOGY | Facility: HOSPITAL | Age: 74
End: 2024-06-06
Payer: MEDICARE

## 2024-06-06 ENCOUNTER — HOSPITAL ENCOUNTER (INPATIENT)
Facility: HOSPITAL | Age: 74
LOS: 4 days | Discharge: NURSING, REHAB OR LONG-TERM CARE | End: 2024-06-12
Attending: NEUROLOGICAL SURGERY | Admitting: NEUROLOGICAL SURGERY
Payer: MEDICARE

## 2024-06-06 DIAGNOSIS — R13.10 DYSPHAGIA, UNSPECIFIED TYPE: ICD-10-CM

## 2024-06-06 DIAGNOSIS — R63.30 FEEDING DIFFICULTIES: Primary | ICD-10-CM

## 2024-06-06 DIAGNOSIS — G95.9 CERVICAL MYELOPATHY: ICD-10-CM

## 2024-06-06 PROBLEM — Z98.1 S/P CERVICAL SPINAL FUSION: Status: ACTIVE | Noted: 2024-06-06

## 2024-06-06 LAB
ARTERIAL PATENCY WRIST A: POSITIVE
ATMOSPHERIC PRESS: ABNORMAL MM[HG]
BASE DEFICIT: ABNORMAL
BASE DEFICIT: ABNORMAL
BASE EXCESS BLDA CALC-SCNC: -9.2 MMOL/L (ref 0–3)
BASE EXCESS BLDA CALC-SCNC: 0 MMOL/L (ref 0–3)
BASE EXCESS BLDA CALC-SCNC: <0 MMOL/L (ref 0–3)
BDY SITE: ABNORMAL
CA-I BLDA-SCNC: 1.23 MMOL/L (ref 1.12–1.32)
CA-I BLDA-SCNC: 1.25 MMOL/L (ref 1.12–1.32)
CO2 BLDA-SCNC: 15.2 MMOL/L (ref 22–29)
CO2 BLDA-SCNC: 26 MMOL/L (ref 23–27)
CO2 BLDA-SCNC: 29 MMOL/L (ref 23–27)
GLUCOSE BLDC GLUCOMTR-MCNC: 138 MG/DL (ref 70–105)
GLUCOSE BLDC GLUCOMTR-MCNC: 159 MG/DL (ref 70–105)
GLUCOSE BLDC GLUCOMTR-MCNC: 213 MG/DL (ref 70–105)
GLUCOSE BLDC GLUCOMTR-MCNC: 238 MG/DL (ref 70–105)
GLUCOSE BLDC GLUCOMTR-MCNC: 270 MG/DL (ref 70–105)
GLUCOSE BLDC GLUCOMTR-MCNC: 89 MG/DL (ref 70–105)
HCO3 BLDA-SCNC: 14.4 MMOL/L (ref 21–28)
HCO3 BLDA-SCNC: 24.9 MMOL/L (ref 22–26)
HCO3 BLDA-SCNC: 27.5 MMOL/L (ref 22–26)
HCT VFR BLDA CALC: 35 % (ref 38–51)
HCT VFR BLDA CALC: 35 % (ref 38–51)
HCT VFR BLDA CALC: 44 % (ref 38–51)
HEMODILUTION: NO
HGB BLDA-MCNC: 11.9 G/DL (ref 12–17)
HGB BLDA-MCNC: 11.9 G/DL (ref 12–17)
HGB BLDA-MCNC: 15 G/DL (ref 12–17)
INHALED O2 CONCENTRATION: 25 %
MODALITY: ABNORMAL
PCO2 BLDA: 25.6 MM HG (ref 35–48)
PCO2 BLDA: 47.3 MM HG (ref 35–45)
PCO2 BLDA: 64 MM HG (ref 35–45)
PH BLDA: 7.24 PH UNITS (ref 7.35–7.45)
PH BLDA: 7.33 PH UNITS (ref 7.35–7.45)
PH BLDA: 7.36 PH UNITS (ref 7.35–7.45)
PO2 BLD: 309 MM[HG] (ref 0–500)
PO2 BLDA: 182 MM HG (ref 80–105)
PO2 BLDA: 229 MM HG (ref 80–105)
PO2 BLDA: 77.2 MM HG (ref 83–108)
POTASSIUM BLDA-SCNC: 3.9 MMOL/L (ref 3.5–4.9)
POTASSIUM BLDA-SCNC: 4.2 MMOL/L (ref 3.5–4.9)
SAO2 % BLDCOA: 100 % (ref 95–98)
SAO2 % BLDCOA: 100 % (ref 95–98)
SAO2 % BLDCOA: 95.1 % (ref 94–98)
SODIUM BLD-SCNC: 137 MMOL/L (ref 138–146)
SODIUM BLD-SCNC: 138 MMOL/L (ref 138–146)

## 2024-06-06 PROCEDURE — 25010000002 PROPOFOL 200 MG/20ML EMULSION

## 2024-06-06 PROCEDURE — 76000 FLUOROSCOPY <1 HR PHYS/QHP: CPT

## 2024-06-06 PROCEDURE — 36600 WITHDRAWAL OF ARTERIAL BLOOD: CPT

## 2024-06-06 PROCEDURE — 25010000002 DEXAMETHASONE PER 1 MG: Performed by: NEUROLOGICAL SURGERY

## 2024-06-06 PROCEDURE — 25010000002 PROPOFOL 10 MG/ML EMULSION

## 2024-06-06 PROCEDURE — 82948 REAGENT STRIP/BLOOD GLUCOSE: CPT

## 2024-06-06 PROCEDURE — 25010000002 HYDRALAZINE PER 20 MG: Performed by: INTERNAL MEDICINE

## 2024-06-06 PROCEDURE — 85014 HEMATOCRIT: CPT

## 2024-06-06 PROCEDURE — 25010000002 CEFAZOLIN PER 500 MG: Performed by: NEUROLOGICAL SURGERY

## 2024-06-06 PROCEDURE — 63710000001 INSULIN REGULAR HUMAN PER 5 UNITS: Performed by: ANESTHESIOLOGY

## 2024-06-06 PROCEDURE — 85018 HEMOGLOBIN: CPT

## 2024-06-06 PROCEDURE — 63710000001 HYDROCODONE-ACETAMINOPHEN 5-325 MG TABLET

## 2024-06-06 PROCEDURE — 25010000002 PHENYLEPHRINE 10 MG/ML SOLUTION 5 ML VIAL

## 2024-06-06 PROCEDURE — 22551 ARTHRD ANT NTRBDY CERVICAL: CPT

## 2024-06-06 PROCEDURE — 25810000003 SODIUM CHLORIDE 0.9 % SOLUTION

## 2024-06-06 PROCEDURE — 22853 INSJ BIOMECHANICAL DEVICE: CPT

## 2024-06-06 PROCEDURE — 25810000003 SODIUM CHLORIDE 0.9 % SOLUTION 250 ML FLEX CONT

## 2024-06-06 PROCEDURE — 22552 ARTHRD ANT NTRBD CERVICAL EA: CPT | Performed by: NEUROLOGICAL SURGERY

## 2024-06-06 PROCEDURE — 22853 INSJ BIOMECHANICAL DEVICE: CPT | Performed by: NEUROLOGICAL SURGERY

## 2024-06-06 PROCEDURE — 25010000002 ONDANSETRON PER 1 MG

## 2024-06-06 PROCEDURE — 22845 INSERT SPINE FIXATION DEVICE: CPT | Performed by: NEUROLOGICAL SURGERY

## 2024-06-06 PROCEDURE — C1713 ANCHOR/SCREW BN/BN,TIS/BN: HCPCS | Performed by: NEUROLOGICAL SURGERY

## 2024-06-06 PROCEDURE — 25010000002 HYDROMORPHONE 1 MG/ML SOLUTION

## 2024-06-06 PROCEDURE — 84132 ASSAY OF SERUM POTASSIUM: CPT

## 2024-06-06 PROCEDURE — 22552 ARTHRD ANT NTRBD CERVICAL EA: CPT

## 2024-06-06 PROCEDURE — 25010000002 SUCCINYLCHOLINE PER 20 MG

## 2024-06-06 PROCEDURE — 82330 ASSAY OF CALCIUM: CPT

## 2024-06-06 PROCEDURE — 94799 UNLISTED PULMONARY SVC/PX: CPT

## 2024-06-06 PROCEDURE — 94640 AIRWAY INHALATION TREATMENT: CPT

## 2024-06-06 PROCEDURE — 0RB30ZZ EXCISION OF CERVICAL VERTEBRAL DISC, OPEN APPROACH: ICD-10-PCS | Performed by: NEUROLOGICAL SURGERY

## 2024-06-06 PROCEDURE — 25010000002 METHYLPREDNISOLONE PER 40 MG: Performed by: NEUROLOGICAL SURGERY

## 2024-06-06 PROCEDURE — 82803 BLOOD GASES ANY COMBINATION: CPT

## 2024-06-06 PROCEDURE — 25810000003 LACTATED RINGERS PER 1000 ML: Performed by: NEUROLOGICAL SURGERY

## 2024-06-06 PROCEDURE — 25010000002 MAGNESIUM SULFATE PER 500 MG OF MAGNESIUM

## 2024-06-06 PROCEDURE — A9270 NON-COVERED ITEM OR SERVICE: HCPCS

## 2024-06-06 PROCEDURE — 22845 INSERT SPINE FIXATION DEVICE: CPT

## 2024-06-06 PROCEDURE — 72040 X-RAY EXAM NECK SPINE 2-3 VW: CPT

## 2024-06-06 PROCEDURE — 0RG20A0 FUSION OF 2 OR MORE CERVICAL VERTEBRAL JOINTS WITH INTERBODY FUSION DEVICE, ANTERIOR APPROACH, ANTERIOR COLUMN, OPEN APPROACH: ICD-10-PCS | Performed by: NEUROLOGICAL SURGERY

## 2024-06-06 PROCEDURE — 94761 N-INVAS EAR/PLS OXIMETRY MLT: CPT

## 2024-06-06 PROCEDURE — 82947 ASSAY GLUCOSE BLOOD QUANT: CPT

## 2024-06-06 PROCEDURE — 84295 ASSAY OF SERUM SODIUM: CPT

## 2024-06-06 PROCEDURE — 22551 ARTHRD ANT NTRBDY CERVICAL: CPT | Performed by: NEUROLOGICAL SURGERY

## 2024-06-06 PROCEDURE — 25010000002 DEXAMETHASONE SODIUM PHOSPHATE 20 MG/5ML SOLUTION

## 2024-06-06 DEVICE — DEV CONTRL TISS STRATAFIX SPIRAL MNCRYL UD 3/0 PLS 30CM: Type: IMPLANTABLE DEVICE | Site: SPINE CERVICAL | Status: FUNCTIONAL

## 2024-06-06 DEVICE — COALITION MIS ANCHOR, 12MM
Type: IMPLANTABLE DEVICE | Site: SPINE CERVICAL | Status: FUNCTIONAL
Brand: COALITION MIS

## 2024-06-06 DEVICE — COALITION MIS SPACER, 14 X 16, 7&DEG;, 7MM
Type: IMPLANTABLE DEVICE | Site: SPINE CERVICAL | Status: FUNCTIONAL
Brand: COALITION MIS

## 2024-06-06 DEVICE — FLOSEAL WITH RECOTHROM - 10ML.
Type: IMPLANTABLE DEVICE | Site: SPINE CERVICAL | Status: FUNCTIONAL
Brand: FLOSEAL HEMOSTATIC MATRIX

## 2024-06-06 DEVICE — COALITION MIS SPACER, 14 X 16, 7&DEG;, 8MM
Type: IMPLANTABLE DEVICE | Site: SPINE CERVICAL | Status: FUNCTIONAL
Brand: COALITION MIS

## 2024-06-06 DEVICE — ALLOGRFT FIBR OSTEOAMP SELECT 1CC: Type: IMPLANTABLE DEVICE | Site: SPINE CERVICAL | Status: FUNCTIONAL

## 2024-06-06 DEVICE — I-FACTOR PUTTY, 1.0CC SYRINGE
Type: IMPLANTABLE DEVICE | Site: SPINE CERVICAL | Status: FUNCTIONAL
Brand: I-FACTOR PEPTIDE ENHANCED BONE GRAFT

## 2024-06-06 RX ORDER — SODIUM CHLORIDE 0.9 % (FLUSH) 0.9 %
10 SYRINGE (ML) INJECTION EVERY 12 HOURS SCHEDULED
Status: DISCONTINUED | OUTPATIENT
Start: 2024-06-06 | End: 2024-06-12 | Stop reason: HOSPADM

## 2024-06-06 RX ORDER — FLUMAZENIL 0.1 MG/ML
0.2 INJECTION INTRAVENOUS AS NEEDED
Status: DISCONTINUED | OUTPATIENT
Start: 2024-06-06 | End: 2024-06-06 | Stop reason: HOSPADM

## 2024-06-06 RX ORDER — NICOTINE POLACRILEX 4 MG
15 LOZENGE BUCCAL
Status: DISCONTINUED | OUTPATIENT
Start: 2024-06-06 | End: 2024-06-07

## 2024-06-06 RX ORDER — ACETAMINOPHEN 325 MG/1
650 TABLET ORAL EVERY 4 HOURS PRN
Status: DISCONTINUED | OUTPATIENT
Start: 2024-06-06 | End: 2024-06-12 | Stop reason: HOSPADM

## 2024-06-06 RX ORDER — ONDANSETRON 2 MG/ML
INJECTION INTRAMUSCULAR; INTRAVENOUS AS NEEDED
Status: DISCONTINUED | OUTPATIENT
Start: 2024-06-06 | End: 2024-06-06 | Stop reason: SURG

## 2024-06-06 RX ORDER — POLYETHYLENE GLYCOL 3350 17 G/17G
17 POWDER, FOR SOLUTION ORAL DAILY PRN
Status: DISCONTINUED | OUTPATIENT
Start: 2024-06-06 | End: 2024-06-12 | Stop reason: HOSPADM

## 2024-06-06 RX ORDER — IPRATROPIUM BROMIDE AND ALBUTEROL SULFATE 2.5; .5 MG/3ML; MG/3ML
3 SOLUTION RESPIRATORY (INHALATION)
Status: DISCONTINUED | OUTPATIENT
Start: 2024-06-06 | End: 2024-06-11

## 2024-06-06 RX ORDER — ONDANSETRON 4 MG/1
4 TABLET, ORALLY DISINTEGRATING ORAL EVERY 6 HOURS PRN
Status: DISCONTINUED | OUTPATIENT
Start: 2024-06-06 | End: 2024-06-07 | Stop reason: SDUPTHER

## 2024-06-06 RX ORDER — OXYCODONE HYDROCHLORIDE 5 MG/1
5 TABLET ORAL ONCE AS NEEDED
Status: DISCONTINUED | OUTPATIENT
Start: 2024-06-06 | End: 2024-06-06 | Stop reason: HOSPADM

## 2024-06-06 RX ORDER — SODIUM CHLORIDE 9 MG/ML
INJECTION, SOLUTION INTRAVENOUS CONTINUOUS PRN
Status: DISCONTINUED | OUTPATIENT
Start: 2024-06-06 | End: 2024-06-06 | Stop reason: SURG

## 2024-06-06 RX ORDER — PANTOPRAZOLE SODIUM 40 MG/1
40 TABLET, DELAYED RELEASE ORAL DAILY
Status: DISCONTINUED | OUTPATIENT
Start: 2024-06-06 | End: 2024-06-10

## 2024-06-06 RX ORDER — METOPROLOL SUCCINATE 25 MG/1
25 TABLET, EXTENDED RELEASE ORAL DAILY
Status: DISCONTINUED | OUTPATIENT
Start: 2024-06-07 | End: 2024-06-12 | Stop reason: HOSPADM

## 2024-06-06 RX ORDER — SODIUM CHLORIDE 9 MG/ML
40 INJECTION, SOLUTION INTRAVENOUS AS NEEDED
Status: DISCONTINUED | OUTPATIENT
Start: 2024-06-06 | End: 2024-06-12 | Stop reason: HOSPADM

## 2024-06-06 RX ORDER — NALOXONE HCL 0.4 MG/ML
0.4 VIAL (ML) INJECTION
Status: DISCONTINUED | OUTPATIENT
Start: 2024-06-06 | End: 2024-06-07

## 2024-06-06 RX ORDER — PROPOFOL 10 MG/ML
INJECTION, EMULSION INTRAVENOUS AS NEEDED
Status: DISCONTINUED | OUTPATIENT
Start: 2024-06-06 | End: 2024-06-06 | Stop reason: SURG

## 2024-06-06 RX ORDER — OXYCODONE HCL 10 MG/1
10 TABLET, FILM COATED, EXTENDED RELEASE ORAL ONCE
Status: COMPLETED | OUTPATIENT
Start: 2024-06-06 | End: 2024-06-06

## 2024-06-06 RX ORDER — AMLODIPINE BESYLATE 5 MG/1
10 TABLET ORAL
Status: DISCONTINUED | OUTPATIENT
Start: 2024-06-06 | End: 2024-06-11

## 2024-06-06 RX ORDER — DEXAMETHASONE SODIUM PHOSPHATE 4 MG/ML
4 INJECTION, SOLUTION INTRA-ARTICULAR; INTRALESIONAL; INTRAMUSCULAR; INTRAVENOUS; SOFT TISSUE EVERY 6 HOURS
Status: DISCONTINUED | OUTPATIENT
Start: 2024-06-06 | End: 2024-06-10

## 2024-06-06 RX ORDER — AMOXICILLIN 250 MG
2 CAPSULE ORAL 2 TIMES DAILY PRN
Status: DISCONTINUED | OUTPATIENT
Start: 2024-06-06 | End: 2024-06-12 | Stop reason: HOSPADM

## 2024-06-06 RX ORDER — CELECOXIB 200 MG/1
200 CAPSULE ORAL ONCE
Status: COMPLETED | OUTPATIENT
Start: 2024-06-06 | End: 2024-06-06

## 2024-06-06 RX ORDER — DIPHENHYDRAMINE HYDROCHLORIDE 50 MG/ML
12.5 INJECTION INTRAMUSCULAR; INTRAVENOUS ONCE AS NEEDED
Status: DISCONTINUED | OUTPATIENT
Start: 2024-06-06 | End: 2024-06-06 | Stop reason: HOSPADM

## 2024-06-06 RX ORDER — BISACODYL 5 MG/1
5 TABLET, DELAYED RELEASE ORAL DAILY PRN
Status: DISCONTINUED | OUTPATIENT
Start: 2024-06-06 | End: 2024-06-12 | Stop reason: HOSPADM

## 2024-06-06 RX ORDER — DEXTROSE MONOHYDRATE 25 G/50ML
25 INJECTION, SOLUTION INTRAVENOUS
Status: DISCONTINUED | OUTPATIENT
Start: 2024-06-06 | End: 2024-06-07

## 2024-06-06 RX ORDER — MAGNESIUM SULFATE HEPTAHYDRATE 500 MG/ML
INJECTION, SOLUTION INTRAMUSCULAR; INTRAVENOUS AS NEEDED
Status: DISCONTINUED | OUTPATIENT
Start: 2024-06-06 | End: 2024-06-06 | Stop reason: SURG

## 2024-06-06 RX ORDER — UREA 10 %
5 LOTION (ML) TOPICAL NIGHTLY PRN
Status: DISCONTINUED | OUTPATIENT
Start: 2024-06-06 | End: 2024-06-12 | Stop reason: HOSPADM

## 2024-06-06 RX ORDER — INSULIN LISPRO 100 [IU]/ML
3 INJECTION, SOLUTION INTRAVENOUS; SUBCUTANEOUS
Status: DISCONTINUED | OUTPATIENT
Start: 2024-06-06 | End: 2024-06-07

## 2024-06-06 RX ORDER — SODIUM CHLORIDE 0.9 % (FLUSH) 0.9 %
10 SYRINGE (ML) INJECTION AS NEEDED
Status: DISCONTINUED | OUTPATIENT
Start: 2024-06-06 | End: 2024-06-06 | Stop reason: HOSPADM

## 2024-06-06 RX ORDER — IBUPROFEN 600 MG/1
1 TABLET ORAL
Status: DISCONTINUED | OUTPATIENT
Start: 2024-06-06 | End: 2024-06-07

## 2024-06-06 RX ORDER — SODIUM CHLORIDE 0.9 % (FLUSH) 0.9 %
10 SYRINGE (ML) INJECTION AS NEEDED
Status: DISCONTINUED | OUTPATIENT
Start: 2024-06-06 | End: 2024-06-12 | Stop reason: HOSPADM

## 2024-06-06 RX ORDER — SODIUM CHLORIDE, SODIUM LACTATE, POTASSIUM CHLORIDE, CALCIUM CHLORIDE 600; 310; 30; 20 MG/100ML; MG/100ML; MG/100ML; MG/100ML
1000 INJECTION, SOLUTION INTRAVENOUS CONTINUOUS
Status: DISCONTINUED | OUTPATIENT
Start: 2024-06-06 | End: 2024-06-06

## 2024-06-06 RX ORDER — SUCCINYLCHOLINE CHLORIDE 20 MG/ML
INJECTION INTRAMUSCULAR; INTRAVENOUS AS NEEDED
Status: DISCONTINUED | OUTPATIENT
Start: 2024-06-06 | End: 2024-06-06 | Stop reason: SURG

## 2024-06-06 RX ORDER — ACETAMINOPHEN 500 MG
1000 TABLET ORAL ONCE
Status: COMPLETED | OUTPATIENT
Start: 2024-06-06 | End: 2024-06-06

## 2024-06-06 RX ORDER — LABETALOL HYDROCHLORIDE 5 MG/ML
5 INJECTION, SOLUTION INTRAVENOUS
Status: DISCONTINUED | OUTPATIENT
Start: 2024-06-06 | End: 2024-06-06 | Stop reason: HOSPADM

## 2024-06-06 RX ORDER — INSULIN LISPRO 100 [IU]/ML
2-9 INJECTION, SOLUTION INTRAVENOUS; SUBCUTANEOUS
Status: DISCONTINUED | OUTPATIENT
Start: 2024-06-06 | End: 2024-06-07

## 2024-06-06 RX ORDER — DEXAMETHASONE SODIUM PHOSPHATE 4 MG/ML
INJECTION, SOLUTION INTRA-ARTICULAR; INTRALESIONAL; INTRAMUSCULAR; INTRAVENOUS; SOFT TISSUE AS NEEDED
Status: DISCONTINUED | OUTPATIENT
Start: 2024-06-06 | End: 2024-06-06 | Stop reason: SURG

## 2024-06-06 RX ORDER — BUDESONIDE 0.5 MG/2ML
0.5 INHALANT ORAL
Status: DISCONTINUED | OUTPATIENT
Start: 2024-06-06 | End: 2024-06-11

## 2024-06-06 RX ORDER — ATORVASTATIN CALCIUM 40 MG/1
80 TABLET, FILM COATED ORAL DAILY
Status: DISCONTINUED | OUTPATIENT
Start: 2024-06-06 | End: 2024-06-11

## 2024-06-06 RX ORDER — EPHEDRINE SULFATE 5 MG/ML
5 INJECTION INTRAVENOUS ONCE AS NEEDED
Status: DISCONTINUED | OUTPATIENT
Start: 2024-06-06 | End: 2024-06-06 | Stop reason: HOSPADM

## 2024-06-06 RX ORDER — HYDRALAZINE HYDROCHLORIDE 20 MG/ML
5 INJECTION INTRAMUSCULAR; INTRAVENOUS
Status: DISCONTINUED | OUTPATIENT
Start: 2024-06-06 | End: 2024-06-06 | Stop reason: HOSPADM

## 2024-06-06 RX ORDER — IPRATROPIUM BROMIDE AND ALBUTEROL SULFATE 2.5; .5 MG/3ML; MG/3ML
3 SOLUTION RESPIRATORY (INHALATION) ONCE AS NEEDED
Status: DISCONTINUED | OUTPATIENT
Start: 2024-06-06 | End: 2024-06-06 | Stop reason: HOSPADM

## 2024-06-06 RX ORDER — LIDOCAINE HYDROCHLORIDE 20 MG/ML
INJECTION, SOLUTION EPIDURAL; INFILTRATION; INTRACAUDAL; PERINEURAL AS NEEDED
Status: DISCONTINUED | OUTPATIENT
Start: 2024-06-06 | End: 2024-06-06 | Stop reason: SURG

## 2024-06-06 RX ORDER — DIPHENHYDRAMINE HYDROCHLORIDE 50 MG/ML
12.5 INJECTION INTRAMUSCULAR; INTRAVENOUS
Status: DISCONTINUED | OUTPATIENT
Start: 2024-06-06 | End: 2024-06-06 | Stop reason: HOSPADM

## 2024-06-06 RX ORDER — CYCLOBENZAPRINE HCL 10 MG
10 TABLET ORAL 3 TIMES DAILY PRN
Status: DISCONTINUED | OUTPATIENT
Start: 2024-06-06 | End: 2024-06-12 | Stop reason: HOSPADM

## 2024-06-06 RX ORDER — BISACODYL 10 MG
10 SUPPOSITORY, RECTAL RECTAL DAILY PRN
Status: DISCONTINUED | OUTPATIENT
Start: 2024-06-06 | End: 2024-06-12 | Stop reason: HOSPADM

## 2024-06-06 RX ORDER — LIDOCAINE HYDROCHLORIDE AND EPINEPHRINE 10; 10 MG/ML; UG/ML
INJECTION, SOLUTION INFILTRATION; PERINEURAL AS NEEDED
Status: DISCONTINUED | OUTPATIENT
Start: 2024-06-06 | End: 2024-06-06 | Stop reason: HOSPADM

## 2024-06-06 RX ORDER — METHYLPREDNISOLONE ACETATE 40 MG/ML
INJECTION, SUSPENSION INTRA-ARTICULAR; INTRALESIONAL; INTRAMUSCULAR; SOFT TISSUE AS NEEDED
Status: DISCONTINUED | OUTPATIENT
Start: 2024-06-06 | End: 2024-06-06 | Stop reason: HOSPADM

## 2024-06-06 RX ORDER — EPHEDRINE SULFATE 5 MG/ML
INJECTION INTRAVENOUS AS NEEDED
Status: DISCONTINUED | OUTPATIENT
Start: 2024-06-06 | End: 2024-06-06 | Stop reason: SURG

## 2024-06-06 RX ORDER — ONDANSETRON 2 MG/ML
4 INJECTION INTRAMUSCULAR; INTRAVENOUS ONCE AS NEEDED
Status: DISCONTINUED | OUTPATIENT
Start: 2024-06-06 | End: 2024-06-06 | Stop reason: HOSPADM

## 2024-06-06 RX ORDER — HYDRALAZINE HYDROCHLORIDE 20 MG/ML
10 INJECTION INTRAMUSCULAR; INTRAVENOUS EVERY 6 HOURS PRN
Status: DISCONTINUED | OUTPATIENT
Start: 2024-06-06 | End: 2024-06-12 | Stop reason: HOSPADM

## 2024-06-06 RX ORDER — NALOXONE HCL 0.4 MG/ML
0.4 VIAL (ML) INJECTION AS NEEDED
Status: DISCONTINUED | OUTPATIENT
Start: 2024-06-06 | End: 2024-06-06 | Stop reason: HOSPADM

## 2024-06-06 RX ORDER — REMIFENTANIL HYDROCHLORIDE 1 MG/ML
INJECTION, POWDER, LYOPHILIZED, FOR SOLUTION INTRAVENOUS AS NEEDED
Status: DISCONTINUED | OUTPATIENT
Start: 2024-06-06 | End: 2024-06-06 | Stop reason: SURG

## 2024-06-06 RX ORDER — HYDROCODONE BITARTRATE AND ACETAMINOPHEN 5; 325 MG/1; MG/1
1 TABLET ORAL EVERY 4 HOURS PRN
Status: DISPENSED | OUTPATIENT
Start: 2024-06-06 | End: 2024-06-11

## 2024-06-06 RX ORDER — OXYCODONE HYDROCHLORIDE 5 MG/1
10 TABLET ORAL EVERY 4 HOURS PRN
Status: DISCONTINUED | OUTPATIENT
Start: 2024-06-06 | End: 2024-06-06 | Stop reason: HOSPADM

## 2024-06-06 RX ORDER — ACETAMINOPHEN 160 MG/5ML
650 SOLUTION ORAL EVERY 4 HOURS PRN
Status: DISCONTINUED | OUTPATIENT
Start: 2024-06-06 | End: 2024-06-12 | Stop reason: HOSPADM

## 2024-06-06 RX ORDER — ACETAMINOPHEN 650 MG/1
650 SUPPOSITORY RECTAL EVERY 4 HOURS PRN
Status: DISCONTINUED | OUTPATIENT
Start: 2024-06-06 | End: 2024-06-12 | Stop reason: HOSPADM

## 2024-06-06 RX ORDER — LIDOCAINE HYDROCHLORIDE 10 MG/ML
0.5 INJECTION, SOLUTION INFILTRATION; PERINEURAL ONCE AS NEEDED
Status: DISCONTINUED | OUTPATIENT
Start: 2024-06-06 | End: 2024-06-06 | Stop reason: HOSPADM

## 2024-06-06 RX ADMIN — DEXAMETHASONE SODIUM PHOSPHATE 4 MG: 4 INJECTION, SOLUTION INTRAMUSCULAR; INTRAVENOUS at 17:11

## 2024-06-06 RX ADMIN — EPHEDRINE SULFATE 10 MG: 5 INJECTION INTRAVENOUS at 07:42

## 2024-06-06 RX ADMIN — DEXAMETHASONE SODIUM PHOSPHATE 4 MG: 4 INJECTION, SOLUTION INTRAMUSCULAR; INTRAVENOUS at 22:18

## 2024-06-06 RX ADMIN — HYDROMORPHONE HYDROCHLORIDE 0.5 MG: 1 INJECTION, SOLUTION INTRAMUSCULAR; INTRAVENOUS; SUBCUTANEOUS at 09:08

## 2024-06-06 RX ADMIN — BUDESONIDE 0.5 MG: 0.5 INHALANT RESPIRATORY (INHALATION) at 19:35

## 2024-06-06 RX ADMIN — DEXAMETHASONE SODIUM PHOSPHATE 4 MG: 4 INJECTION, SOLUTION INTRAMUSCULAR; INTRAVENOUS at 08:13

## 2024-06-06 RX ADMIN — SUCCINYLCHOLINE CHLORIDE 160 MG: 20 INJECTION, SOLUTION INTRAMUSCULAR; INTRAVENOUS at 07:27

## 2024-06-06 RX ADMIN — PROPOFOL INJECTABLE EMULSION 150 MCG/KG/MIN: 10 INJECTION, EMULSION INTRAVENOUS at 07:32

## 2024-06-06 RX ADMIN — REMIFENTANIL HYDROCHLORIDE 0.15 MCG/KG/MIN: 5 INJECTION, POWDER, LYOPHILIZED, FOR SOLUTION INTRAVENOUS at 08:05

## 2024-06-06 RX ADMIN — HYDROMORPHONE HYDROCHLORIDE 0.5 MG: 1 INJECTION, SOLUTION INTRAMUSCULAR; INTRAVENOUS; SUBCUTANEOUS at 11:42

## 2024-06-06 RX ADMIN — ACETAMINOPHEN 1000 MG: 500 TABLET, FILM COATED ORAL at 06:48

## 2024-06-06 RX ADMIN — LIDOCAINE HYDROCHLORIDE 100 MG: 20 INJECTION, SOLUTION EPIDURAL; INFILTRATION; INTRACAUDAL; PERINEURAL at 07:27

## 2024-06-06 RX ADMIN — Medication 10 ML: at 22:19

## 2024-06-06 RX ADMIN — HYDROMORPHONE HYDROCHLORIDE 0.5 MG: 1 INJECTION, SOLUTION INTRAMUSCULAR; INTRAVENOUS; SUBCUTANEOUS at 09:37

## 2024-06-06 RX ADMIN — MAGNESIUM SULFATE HEPTAHYDRATE 0.4 G: 500 INJECTION, SOLUTION INTRAMUSCULAR; INTRAVENOUS at 08:45

## 2024-06-06 RX ADMIN — PROPOFOL 180 MG: 10 INJECTION, EMULSION INTRAVENOUS at 07:27

## 2024-06-06 RX ADMIN — REMIFENTANIL HYDROCHLORIDE 0.1 MCG/KG/MIN: 5 INJECTION, POWDER, LYOPHILIZED, FOR SOLUTION INTRAVENOUS at 07:32

## 2024-06-06 RX ADMIN — IPRATROPIUM BROMIDE AND ALBUTEROL SULFATE 3 ML: .5; 3 SOLUTION RESPIRATORY (INHALATION) at 19:30

## 2024-06-06 RX ADMIN — PHENYLEPHRINE HYDROCHLORIDE 0.5 MCG/KG/MIN: 10 INJECTION INTRAVENOUS at 08:13

## 2024-06-06 RX ADMIN — INSULIN HUMAN 10 UNITS: 100 INJECTION, SOLUTION PARENTERAL at 06:52

## 2024-06-06 RX ADMIN — HYDROMORPHONE HYDROCHLORIDE 0.25 MG: 1 INJECTION, SOLUTION INTRAMUSCULAR; INTRAVENOUS; SUBCUTANEOUS at 17:23

## 2024-06-06 RX ADMIN — IPRATROPIUM BROMIDE AND ALBUTEROL SULFATE 3 ML: .5; 3 SOLUTION RESPIRATORY (INHALATION) at 15:41

## 2024-06-06 RX ADMIN — OXYCODONE HYDROCHLORIDE 10 MG: 10 TABLET, FILM COATED, EXTENDED RELEASE ORAL at 06:48

## 2024-06-06 RX ADMIN — SODIUM CHLORIDE, POTASSIUM CHLORIDE, SODIUM LACTATE AND CALCIUM CHLORIDE 1000 ML: 600; 310; 30; 20 INJECTION, SOLUTION INTRAVENOUS at 06:20

## 2024-06-06 RX ADMIN — Medication 10 ML: at 17:11

## 2024-06-06 RX ADMIN — ONDANSETRON 4 MG: 2 INJECTION INTRAMUSCULAR; INTRAVENOUS at 09:37

## 2024-06-06 RX ADMIN — CELECOXIB 200 MG: 200 CAPSULE ORAL at 06:48

## 2024-06-06 RX ADMIN — MAGNESIUM SULFATE HEPTAHYDRATE 0.4 G: 500 INJECTION, SOLUTION INTRAMUSCULAR; INTRAVENOUS at 08:25

## 2024-06-06 RX ADMIN — PROPOFOL 20 MG: 10 INJECTION, EMULSION INTRAVENOUS at 07:32

## 2024-06-06 RX ADMIN — SODIUM CHLORIDE: 9 INJECTION, SOLUTION INTRAVENOUS at 07:31

## 2024-06-06 RX ADMIN — MAGNESIUM SULFATE HEPTAHYDRATE 0.2 G: 500 INJECTION, SOLUTION INTRAMUSCULAR; INTRAVENOUS at 09:15

## 2024-06-06 RX ADMIN — SODIUM CHLORIDE 2 G: 900 INJECTION INTRAVENOUS at 07:14

## 2024-06-06 RX ADMIN — HYDRALAZINE HYDROCHLORIDE 10 MG: 20 INJECTION INTRAMUSCULAR; INTRAVENOUS at 17:11

## 2024-06-06 RX ADMIN — HYDROMORPHONE HYDROCHLORIDE 0.5 MG: 1 INJECTION, SOLUTION INTRAMUSCULAR; INTRAVENOUS; SUBCUTANEOUS at 11:12

## 2024-06-06 RX ADMIN — PHENYLEPHRINE HYDROCHLORIDE 0.5 MCG/KG/MIN: 10 INJECTION INTRAVENOUS at 07:49

## 2024-06-06 RX ADMIN — HYDROCODONE BITARTRATE AND ACETAMINOPHEN 1 TABLET: 5; 325 TABLET ORAL at 11:42

## 2024-06-06 RX ADMIN — HYDROMORPHONE HYDROCHLORIDE 0.25 MG: 1 INJECTION, SOLUTION INTRAMUSCULAR; INTRAVENOUS; SUBCUTANEOUS at 22:26

## 2024-06-06 NOTE — ANESTHESIA PROCEDURE NOTES
Airway  Urgency: elective    Date/Time: 6/6/2024 7:31 AM  Airway not difficult    General Information and Staff    Patient location during procedure: OR  Anesthesiologist: Cruz Baird MD  CRNA/CAA: Gali Hough CRNA    Indications and Patient Condition    Preoxygenated: yes  MILS maintained throughout  Mask difficulty assessment: 0 - not attempted    Final Airway Details  Final airway type: endotracheal airway      Successful airway: ETT  Cuffed: yes   Successful intubation technique: video laryngoscopy  Facilitating devices/methods: intubating stylet  Endotracheal tube insertion site: oral  Blade: Rodriguez  Blade size: 4  ETT size (mm): 7.5  Cormack-Lehane Classification: grade I - full view of glottis  Placement verified by: chest auscultation and capnometry   Measured from: lips  ETT/EBT  to lips (cm): 23  Number of attempts at approach: 1  Assessment: lips, teeth, and gum same as pre-op and atraumatic intubation    Additional Comments  Neutral neck alignment, no excessive neck extension. Intubated by Amanda De Leon SRNA

## 2024-06-06 NOTE — ANESTHESIA PREPROCEDURE EVALUATION
Anesthesia Evaluation     NPO Solid Status: > 8 hours  NPO Liquid Status: > 2 hours           Airway   Mallampati: II  TM distance: >3 FB  Neck ROM: limited  Possible difficult intubation  Dental - normal exam     Pulmonary - normal exam   Cardiovascular - normal exam    (+) past MI  3-6 months, CAD, angina with exertion, DVT resolved, hyperlipidemia,  carotid artery disease      Neuro/Psych  (+) headaches, numbness, psychiatric history Anxiety  GI/Hepatic/Renal/Endo    (+) GERD well controlled, renal disease-, diabetes mellitus type 2    Musculoskeletal     (+) neck pain  Abdominal  - normal exam    Bowel sounds: normal.   Substance History      OB/GYN          Other   arthritis,                 Anesthesia Plan    ASA 3     general, [place] and ERAS Protocol   total IV anesthesia  intravenous induction     Anesthetic plan, risks, benefits, and alternatives have been provided, discussed and informed consent has been obtained with: patient.  Pre-procedure education provided  Plan discussed with CRNA.    CODE STATUS:

## 2024-06-06 NOTE — ANESTHESIA POSTPROCEDURE EVALUATION
Patient: Gurinder Amezcua    Procedure Summary       Date: 06/06/24 Room / Location: [facility] OR  / [facility] MAIN OR    Anesthesia Start: 0722 Anesthesia Stop: 1010    Procedure: Cervical 3-5 anterior cervical discectomy and fusion (Spine Cervical) Diagnosis:       Cervical myelopathy      (Cervical myelopathy [G95.9])    Surgeons: Bang Mirza IV, MD Provider: Cruz Baird MD    Anesthesia Type: general, Marah, ERAS Protocol ASA Status: 3            Anesthesia Type: general, Marah, ERAS Protocol    Vitals  Vitals Value Taken Time   /89 06/06/24 1122   Temp 97.2 °F (36.2 °C) 06/06/24 1041   Pulse 74 06/06/24 1126   Resp 12 06/06/24 1113   SpO2 92 % 06/06/24 1126   Vitals shown include unfiled device data.        Post Anesthesia Care and Evaluation    Patient location during evaluation: PACU  Patient participation: complete - patient participated  Level of consciousness: awake  Pain scale: See nurse's notes for pain score.  Pain management: adequate    Airway patency: patent  Anesthetic complications: No anesthetic complications  PONV Status: none  Cardiovascular status: acceptable  Respiratory status: acceptable and spontaneous ventilation  Hydration status: acceptable    Comments: Patient seen and examined postoperatively; vital signs stable; SpO2 greater than or equal to 90%; cardiopulmonary status stable; nausea/vomiting adequately controlled; pain adequately controlled; no apparent anesthesia complications; patient discharged from anesthesia care when discharge criteria were met

## 2024-06-06 NOTE — OP NOTE
CERVICAL DISCECTOMY ANTERIOR FUSION WITH INSTRUMENTATION  Procedure Report    Patient Name:  Gurinder Amezcua  YOB: 1950    Date of Surgery:  6/6/2024     Indications: 74-year-old male status post C5-6 ACDF many years ago with severe adjacent segment disease with central and foraminal stenosis and signs and symptoms of progressive radiculopathy and cervical myelopathy refractory to conservative measures.  Given this patient was taken to the OR for C3-5 ACDF.  Patient understood the risks of surgery including bleeding infection CSF leak nerve damage spinal cord injury vascular injury stroke injury to neck structures including esophagus trachea recurrent laryngeal nerve injury permanent hoarseness and/or dysphagia hardware failure pseudoarthrosis need for future operation among many other risks and he agreed to undergo the procedure    Pre-op Diagnosis:   Cervical myelopathy [G95.9]       Post-Op Diagnosis Codes:     * Cervical myelopathy [G95.9]    Procedure/CPT® Codes:      Procedure(s):  C3-4 and C4-5 anterior cervical discectomy for arthrodesis  C3-4 and C4-5 placement of globus peek interbody cage  C3-C5 anterior instrumentation with globus plates and fixation pins        Spinal Surgery Levels Completed:2 Levels      Staff:  Surgeon(s):  Bang Mirza IV, MD    Assistant: Issa Justin PA    Anesthesia: General    Estimated Blood Loss:  50cc    Implants:    Implant Name Type Inv. Item Serial No.  Lot No. LRB No. Used Action   DEV CONTRL TISS STRATAFIX SPIRAL MNCRYL UD 3/0 PLS 30CM - [id number] Implant DEV CONTRL TISS STRATAFIX SPIRAL MNCRYL UD 3/0 PLS 30CM  [employer]  DIV OF J AND J [id number] N/A 1 Implanted   KT SEAL HEMOS ABS FLOSEAL MATRX 1.5/FAST/PREP 5000/IU 10ML - [id number] Implant KT SEAL HEMOS ABS FLOSEAL MATRX 1.5/FAST/PREP 5000/IU 10ML  [facility] [id number] N/A 1 Implanted   GRFT BONE IFACTOR PUTTY 1ML - [id number] Implant GRFT BONE IFACTOR PUTTY 1ML   [facility] [id number] N/A 1 Implanted   ALLOGRFT FIBR OSTEOAMP SELECT 1CC - [id number] - [id number] Implant ALLOGRFT FIBR OSTEOAMP SELECT 1CC [id number] . N/A 1 Implanted   KT SEAL HEMOS ABS FLOSEAL MATRX 1.5/FAST/PREP 5000/IU 10ML - [id number] Implant KT SEAL HEMOS ABS FLOSEAL MATRX 1.5/FAST/PREP 5000/IU 10ML  [facility] [id number] N/A 1 Implanted   ANCHR COALITIONMIS 12MM - [id number] Implant ANCHR COALITIONMIS 12MM  [employer] . N/A 4 Implanted   SPACR ANT CERV COALITIONMIS TI 7DEG [id number] - [id number] Implant SPACR ANT CERV COALITIONMIS TI 7DEG [id number]  [employer] . N/A 1 Implanted   SPACR ANT CERV COALITIONMIS TI 7DEG [id number] - [id number] Implant SPACR ANT CERV COALITIONMIS TI 7DEG [id number]  [employer] . N/A 1 Implanted       Specimen:          None        Findings: None    Complications: None    Description of Procedure: Patient was brought to the OR and placed under general endotracheal anesthesia.  He was positioned supine on a regular or table with his neck slightly extended and supported.  Patient was prepped and draped in the usual fashion.  Incision was planned over the C4 vertebral body under lateral and AP fluoroscopy.  Incision was made and carried down to the deep dermal and fat layers with monopolar cautery.  Self-retaining retractor was placed exposing the platysma.  Platysma was  from underlying neck structures and cut sharply with Metzenbaum scissors.  Planes the neck were followed down to the anterior cervical spine.  C3-4 disc space was identified under lateral fluoroscopy and the longus coli muscles were detached from C3-C5 with bipolar cautery and monopolar cautery set at a low setting.  Shadow line retractor system was placed at C3-4 and [place] pins were placed into the C3 and C4 vertebral bodies under fluoroscopic guidance.  [place] retractor was placed and opened thus opening the disc space.  Curettes were used to remove soft disc and  cartilaginous endplate.  3 Kerrison and high-speed drill was used to remove anterior osteophytes.  Microscope was brought to the field.  Remaining cartilaginous endplate was removed from the endplates with high-speed drill.  High-speed drill was used to remove posterior osteophytes.  Posterior longitudinal ligament was opened with a 2 Kerrison and removed as well as remaining posterior osteophytes thus completing central decompression.  Foraminotomies were performed bilaterally with 2 Kerrison.  Hemostasis was achieved with bipolar cautery and Gelfoam powder and the disc space was thoroughly irrigated.  Globus peek interbody cage was passed into the disc space under fluoroscopic guidance.  It was filled with iFactor and allograft for fusion.  Anterior instrumentation was performed with globus titanium plate and fixation pins under fluoroscopic guidance.  Locking cap was final tightened.  Retractor systems were removed down to the C4-5 level in the same series of steps were undertaken with discectomy and endplate preparation, central and foraminal decompression, hemostasis and placement of globus peek interbody cage filled with iFactor and allograft and anterior instrumentation with globus titanium plate and fixation pins under fluoroscopic guidance.  Locking cap was again tightened.  Retractors were removed.  Final x-rays demonstrated good positioning of all hardware.  Hemostasis was achieved with bipolar cautery and Gelfoam powder and the wound was thoroughly irrigated.  Steroid was placed over the esophagus.  Platysma was closed with 3-0 Vicryl sutures, the deep dermal layer with 3-0 Vicryl sutures and the skin was closed with running subcuticular Monocryl.  Dermabond was placed over the wound.  There were no changes in neuromonitoring throughout the case                Assistant: Issa Justin PA  was responsible for performing the following activities: Retraction, Suction, Irrigation, Suturing, Closing, and  Placing Dressing and their skilled assistance was necessary for the success of this case.    Bang Mirza IV, MD     Date: 6/6/2024  Time: 09:45 EDT

## 2024-06-06 NOTE — CONSULTS
History and Physical   Gurinder Amezcua : 1950 MRN:[id number] LOS:0     Reason for admission: Cervical myelopathy     Assessment / Plan     Patient with underlying carotid artery occlusion DVT depression diabetes mellitus migraine hyperlipidemia peripheral neuropathy was here for cervical 3-5 anterior cervical decitabine infusion.  Blood pressure in the high side.  Will adjust BP med. And there is ABG with hypercapnia - will repeat ABG and if needed put on NIPPV.  Resp tx ordered. Insulin regimen is adjusted.     Given patient dysphagia, patient is put on n.p.o.  SLP to see. CT neck is on hold after discussing with spine surgeon as he is expecting him to be dysphagia after the surgery.  But if things get worse we will get CT neck.  Decadron is added by spine surgery team.     Pain and DC defer to spine team.              Nutrition: Diet: Regular/House; Fluid Consistency: Thin (IDDSI 0)     DVT Prophylaxis:   Mechanical Order History:        Ordered        24 1413  Place Sequential Compression Device  Once            24 1413  Maintain Sequential Compression Device  Continuous            24 0546  SCD (Sequential Compression Device) - Place on Patient in Pre-Op  Once                          Pharmalogical Order History:       None             History of Present illness     74-year-old male with anxiety carotid artery occlusion DVT depression diabetes mellitus migraine hyperlipidemia peripheral neuropathy presented to hospital for the Cervical 3-5 anterior cervical discectomy and fusion.  Hospitalist team is consulted for underlying medical condition.        Subjective / Review of systems     Review of Systems   Pain in the neck cannot swallow and coughing a lot    Past Medical/Surgical/Social/Family History & Allergies     Past Medical History:   Diagnosis Date    Ankylosing spondylitis of site in spine     Spine surgery    Anxiety     Carotid artery occlusion     Cataracts, bilateral  04/2022    Cervical disc disorder     Chronic pain disorder     Coronary artery disease of native artery of native heart with stable angina pectoris 12/28/2023    Deep vein thrombosis 2018 approx    none since    Depression 6/19    Death of wife    Diabetes mellitus 2017 approx    Type 2    Headache Always    Heart attack 06/17/2021    Low back pain     Lumbosacral disc disease     Migraine Unknown    Mixed hyperlipidemia 03/08/2018    Neck pain Feb 2023    Peripheral neuropathy     Rheumatoid arthritis Unknown    Shingles     Spinal stenosis     Thoracic disc disorder     Trigeminal neuralgia     Ureteral stone with hydronephrosis 11/24/2016      Past Surgical History:   Procedure Laterality Date    BACK SURGERY  2019    4 rods in back    BRAIN SURGERY      CARDIAC CATHETERIZATION  06/17/2021    CARDIAC CATHETERIZATION N/A 01/02/2024    Procedure: Left Heart Cath with angiogram;  Surgeon: Akin Brown MD;  [facility]: [facility] CATH INVASIVE [facility];  Service: Cardiovascular;  Laterality: N/A;    CARDIAC CATHETERIZATION N/A 01/02/2024    Procedure: Left ventriculography;  Surgeon: Akin Brown MD;  Location:  [facility] CATH INVASIVE LOCATION;  Service: Cardiovascular;  Laterality: N/A;    CAROTID STENT      CORONARY STENT PLACEMENT  06/17/2021    CRANIOTOMY      CYBERKNIFE      ENDOSCOPY      EPIDURAL BLOCK      HERNIA REPAIR      KNEE ARTHROSCOPY      LAMINECTOMY      NECK SURGERY  Unknown    SPINAL CORD STIMULATOR IMPLANT  Previously    SPINAL FUSION  Unknown    SPINE SURGERY      TRIGEMINAL NERVE DECOMPRESSION      TRIGGER POINT INJECTION        Social History     Socioeconomic History    Marital status:    Tobacco Use    Smoking status: Never     Passive exposure: Never    Smokeless tobacco: Never   Vaping Use    Vaping status: Never Used   Substance and Sexual Activity    Alcohol use: Never     Alcohol/week: 4.0 standard drinks of alcohol     Comment: or less per week    Drug use: Never    Sexual activity:  Never      Family History   Problem Relation Age of Onset    Diabetes Mother             Arthritis Mother     Heart attack Father             Aneurysm Father       Allergies   Allergen Reactions    Adhesive Tape Itching     tegraderm patches causes itching    Latex Unknown - High Severity    Methocarbamol Itching and Rash     Arms etc. No hives.       Pregabalin Confusion and Other (See Comments)     Hypnotic driving episode  Dizziness and inability to focus  Dizziness and inability to focus  Other reaction(s): Confusion  lyrica      Silver Rash        Home Medications     Prior to Admission medications    Medication Sig Start Date End Date Taking? Authorizing Provider   aspirin 81 MG EC tablet Take 1 tablet by mouth Daily.   Yes Hailey Jacobs MD   atorvastatin (LIPITOR) 80 MG tablet Take 1 tablet by mouth Daily.   Yes Hailey Jacobs MD   empagliflozin (Jardiance) 10 MG tablet tablet  24  Yes Hailey Jacobs MD Lyumjev KwikPen 100 UNIT/ML solution pen-injector Inject 12 Units under the skin into the appropriate area as directed Every Morning. 24  Yes Hailey Jacobs MD   metFORMIN ER (GLUCOPHAGE-XR) 500 MG 24 hr tablet Take 2 tablets by mouth Daily With Breakfast. 3/14/24  Yes Hailey Jacobs MD   metoprolol succinate XL (TOPROL-XL) 25 MG 24 hr tablet Take 1 tablet by mouth Daily. 24  Yes Akin Brown MD   pantoprazole (PROTONIX) 40 MG EC tablet Take 1 tablet by mouth Daily. 10/15/22  Yes Hailey Jacobs MD Toujeo SoloStar 300 UNIT/ML solution pen-injector injection Inject 30 Units under the skin into the appropriate area as directed Daily. Inject 25 units QHS  Patient taking differently: Inject 40 Units under the skin into the appropriate area as directed Daily. 22  Yes La Mccormick MD   B-D UF III MINI PEN NEEDLES 31G X 5 MM misc TEST EVERY DAY 10/3/22   La Mccormick MD   Lancets (freestyle) lancets FreeStyle Lancets     Provider, MD Hailey   metFORMIN ER (GLUCOPHAGE-XR) 750 MG 24 hr tablet Take 2 tabs before breakfast.  Patient not taking: Reported on 5/20/2024 6/8/22   La Mccormick MD      Objective / Physical Exam   Vital signs:  Temp: 97.7 °F (36.5 °C)  BP: 122/77  Heart Rate: 74  Resp: 11  SpO2: 97 %  Weight: 101 kg (222 lb)    Admission Weight: Weight: 99.8 kg (220 lb)    Physical Exam   Physical Exam  HENT:      Head: Normocephalic and atraumatic.      Nose: Nose normal.   There is surgery site with clean dressing in the front of the neck   Eyes:      Extraocular Movements: Extraocular movements intact.      Conjunctivae/sclera: Conjunctivae normal.      Pupils: Pupils are equal, round, and reactive to light.   Cardiovascular:      Rate and Rhythm: normal       Pulses: Normal pulses.      Heart sounds: Normal heart sounds.   Pulmonary:      Effort: normal      Breath sounds: normal   Abdominal:      General: Abdomen is flat. Bowel sounds are normal.      Palpations: Abdomen is soft.   Musculoskeletal:         General: Normal range of motion.      Cervical back: Normal range of motion and neck supple.        Labs     Results from last 7 days   Lab Units 06/06/24  0949 06/06/24  0837   HEMATOCRIT POC % 35* 35*             Current Medications   Scheduled Meds:atorvastatin, 80 mg, Oral, Daily  insulin lispro, 2-9 Units, Subcutaneous, 4x Daily AC & at Bedtime  [START ON 6/7/2024] metoprolol succinate XL, 25 mg, Oral, Daily  pantoprazole, 40 mg, Oral, Daily  sodium chloride, 10 mL, Intravenous, Q12H         Continuous Infusions:      Erika Snyder MD  [facility]   06/06/24   14:43 EDT

## 2024-06-07 ENCOUNTER — APPOINTMENT (OUTPATIENT)
Dept: GENERAL RADIOLOGY | Facility: HOSPITAL | Age: 74
End: 2024-06-07
Payer: MEDICARE

## 2024-06-07 LAB
ANION GAP SERPL CALCULATED.3IONS-SCNC: 11.5 MMOL/L (ref 5–15)
BASOPHILS # BLD AUTO: 0.01 10*3/MM3 (ref 0–0.2)
BASOPHILS NFR BLD AUTO: 0.2 % (ref 0–1.5)
BUN SERPL-MCNC: 16 MG/DL (ref 8–23)
BUN/CREAT SERPL: 19.3 (ref 7–25)
CALCIUM SPEC-SCNC: 9.1 MG/DL (ref 8.6–10.5)
CHLORIDE SERPL-SCNC: 102 MMOL/L (ref 98–107)
CO2 SERPL-SCNC: 21.5 MMOL/L (ref 22–29)
CREAT SERPL-MCNC: 0.83 MG/DL (ref 0.76–1.27)
DEPRECATED RDW RBC AUTO: 43.3 FL (ref 37–54)
EGFRCR SERPLBLD CKD-EPI 2021: 92.4 ML/MIN/1.73
EOSINOPHIL # BLD AUTO: 0 10*3/MM3 (ref 0–0.4)
EOSINOPHIL NFR BLD AUTO: 0 % (ref 0.3–6.2)
ERYTHROCYTE [DISTWIDTH] IN BLOOD BY AUTOMATED COUNT: 13.2 % (ref 12.3–15.4)
GLUCOSE BLDC GLUCOMTR-MCNC: 235 MG/DL (ref 70–105)
GLUCOSE BLDC GLUCOMTR-MCNC: 267 MG/DL (ref 70–105)
GLUCOSE BLDC GLUCOMTR-MCNC: 267 MG/DL (ref 70–105)
GLUCOSE BLDC GLUCOMTR-MCNC: 275 MG/DL (ref 70–105)
GLUCOSE BLDC GLUCOMTR-MCNC: 293 MG/DL (ref 70–105)
GLUCOSE SERPL-MCNC: 282 MG/DL (ref 65–99)
HCT VFR BLD AUTO: 40.6 % (ref 37.5–51)
HGB BLD-MCNC: 13.5 G/DL (ref 13–17.7)
IMM GRANULOCYTES # BLD AUTO: 0.02 10*3/MM3 (ref 0–0.05)
IMM GRANULOCYTES NFR BLD AUTO: 0.3 % (ref 0–0.5)
LYMPHOCYTES # BLD AUTO: 0.53 10*3/MM3 (ref 0.7–3.1)
LYMPHOCYTES NFR BLD AUTO: 8.5 % (ref 19.6–45.3)
MCH RBC QN AUTO: 29.7 PG (ref 26.6–33)
MCHC RBC AUTO-ENTMCNC: 33.3 G/DL (ref 31.5–35.7)
MCV RBC AUTO: 89.4 FL (ref 79–97)
MONOCYTES # BLD AUTO: 0.07 10*3/MM3 (ref 0.1–0.9)
MONOCYTES NFR BLD AUTO: 1.1 % (ref 5–12)
NEUTROPHILS NFR BLD AUTO: 5.64 10*3/MM3 (ref 1.7–7)
NEUTROPHILS NFR BLD AUTO: 89.9 % (ref 42.7–76)
NRBC BLD AUTO-RTO: 0 /100 WBC (ref 0–0.2)
PLATELET # BLD AUTO: 134 10*3/MM3 (ref 140–450)
PMV BLD AUTO: 10 FL (ref 6–12)
POTASSIUM SERPL-SCNC: 4.6 MMOL/L (ref 3.5–5.2)
RBC # BLD AUTO: 4.54 10*6/MM3 (ref 4.14–5.8)
SODIUM SERPL-SCNC: 135 MMOL/L (ref 136–145)
WBC NRBC COR # BLD AUTO: 6.27 10*3/MM3 (ref 3.4–10.8)

## 2024-06-07 PROCEDURE — A9270 NON-COVERED ITEM OR SERVICE: HCPCS | Performed by: NEUROLOGICAL SURGERY

## 2024-06-07 PROCEDURE — 25810000003 SODIUM CHLORIDE 0.9 % SOLUTION: Performed by: NEUROLOGICAL SURGERY

## 2024-06-07 PROCEDURE — 94761 N-INVAS EAR/PLS OXIMETRY MLT: CPT

## 2024-06-07 PROCEDURE — 97165 OT EVAL LOW COMPLEX 30 MIN: CPT

## 2024-06-07 PROCEDURE — 63710000001 INSULIN LISPRO (HUMAN) PER 5 UNITS: Performed by: HOSPITALIST

## 2024-06-07 PROCEDURE — 94799 UNLISTED PULMONARY SVC/PX: CPT

## 2024-06-07 PROCEDURE — 92610 EVALUATE SWALLOWING FUNCTION: CPT

## 2024-06-07 PROCEDURE — 63710000001 INSULIN GLARGINE PER 5 UNITS: Performed by: INTERNAL MEDICINE

## 2024-06-07 PROCEDURE — 72040 X-RAY EXAM NECK SPINE 2-3 VW: CPT

## 2024-06-07 PROCEDURE — 82948 REAGENT STRIP/BLOOD GLUCOSE: CPT | Performed by: HOSPITALIST

## 2024-06-07 PROCEDURE — 99024 POSTOP FOLLOW-UP VISIT: CPT

## 2024-06-07 PROCEDURE — 80048 BASIC METABOLIC PNL TOTAL CA: CPT

## 2024-06-07 PROCEDURE — 25010000002 DEXAMETHASONE PER 1 MG: Performed by: NEUROLOGICAL SURGERY

## 2024-06-07 PROCEDURE — 63710000001 INSULIN LISPRO (HUMAN) PER 5 UNITS: Performed by: NEUROLOGICAL SURGERY

## 2024-06-07 PROCEDURE — 82948 REAGENT STRIP/BLOOD GLUCOSE: CPT

## 2024-06-07 PROCEDURE — A9270 NON-COVERED ITEM OR SERVICE: HCPCS

## 2024-06-07 PROCEDURE — 25010000002 MORPHINE PER 10 MG: Performed by: HOSPITALIST

## 2024-06-07 PROCEDURE — A9270 NON-COVERED ITEM OR SERVICE: HCPCS | Performed by: INTERNAL MEDICINE

## 2024-06-07 PROCEDURE — 94664 DEMO&/EVAL PT USE INHALER: CPT

## 2024-06-07 PROCEDURE — A9270 NON-COVERED ITEM OR SERVICE: HCPCS | Performed by: HOSPITALIST

## 2024-06-07 PROCEDURE — 97162 PT EVAL MOD COMPLEX 30 MIN: CPT

## 2024-06-07 PROCEDURE — 85025 COMPLETE CBC W/AUTO DIFF WBC: CPT

## 2024-06-07 PROCEDURE — 25010000002 ONDANSETRON PER 1 MG: Performed by: HOSPITALIST

## 2024-06-07 PROCEDURE — 63710000001 INSULIN LISPRO (HUMAN) PER 5 UNITS

## 2024-06-07 PROCEDURE — 25010000002 HYDROMORPHONE 1 MG/ML SOLUTION: Performed by: HOSPITALIST

## 2024-06-07 PROCEDURE — 25010000002 HYDROMORPHONE 1 MG/ML SOLUTION

## 2024-06-07 RX ORDER — DEXTROSE MONOHYDRATE 25 G/50ML
25 INJECTION, SOLUTION INTRAVENOUS
Status: DISCONTINUED | OUTPATIENT
Start: 2024-06-07 | End: 2024-06-11

## 2024-06-07 RX ORDER — IBUPROFEN 600 MG/1
1 TABLET ORAL
Status: DISCONTINUED | OUTPATIENT
Start: 2024-06-07 | End: 2024-06-11

## 2024-06-07 RX ORDER — ONDANSETRON 2 MG/ML
4 INJECTION INTRAMUSCULAR; INTRAVENOUS EVERY 6 HOURS PRN
Status: DISCONTINUED | OUTPATIENT
Start: 2024-06-07 | End: 2024-06-12

## 2024-06-07 RX ORDER — SODIUM CHLORIDE 9 MG/ML
100 INJECTION, SOLUTION INTRAVENOUS CONTINUOUS
Status: DISCONTINUED | OUTPATIENT
Start: 2024-06-07 | End: 2024-06-12 | Stop reason: HOSPADM

## 2024-06-07 RX ORDER — FENTANYL 25 UG/1
1 PATCH TRANSDERMAL
Status: DISCONTINUED | OUTPATIENT
Start: 2024-06-07 | End: 2024-06-07

## 2024-06-07 RX ORDER — NICOTINE POLACRILEX 4 MG
15 LOZENGE BUCCAL
Status: DISCONTINUED | OUTPATIENT
Start: 2024-06-07 | End: 2024-06-07

## 2024-06-07 RX ORDER — MORPHINE SULFATE 2 MG/ML
2 INJECTION, SOLUTION INTRAMUSCULAR; INTRAVENOUS EVERY 4 HOURS PRN
Status: DISCONTINUED | OUTPATIENT
Start: 2024-06-07 | End: 2024-06-12

## 2024-06-07 RX ORDER — DEXTROSE MONOHYDRATE 25 G/50ML
25 INJECTION, SOLUTION INTRAVENOUS
Status: DISCONTINUED | OUTPATIENT
Start: 2024-06-07 | End: 2024-06-07

## 2024-06-07 RX ORDER — IBUPROFEN 600 MG/1
1 TABLET ORAL
Status: DISCONTINUED | OUTPATIENT
Start: 2024-06-07 | End: 2024-06-07

## 2024-06-07 RX ORDER — INSULIN LISPRO 100 [IU]/ML
5 INJECTION, SOLUTION INTRAVENOUS; SUBCUTANEOUS
Status: DISCONTINUED | OUTPATIENT
Start: 2024-06-07 | End: 2024-06-07

## 2024-06-07 RX ORDER — NALOXONE HCL 0.4 MG/ML
0.4 VIAL (ML) INJECTION
Status: DISCONTINUED | OUTPATIENT
Start: 2024-06-07 | End: 2024-06-12 | Stop reason: HOSPADM

## 2024-06-07 RX ORDER — INSULIN LISPRO 100 [IU]/ML
2-9 INJECTION, SOLUTION INTRAVENOUS; SUBCUTANEOUS EVERY 6 HOURS SCHEDULED
Status: DISCONTINUED | OUTPATIENT
Start: 2024-06-07 | End: 2024-06-07

## 2024-06-07 RX ORDER — MORPHINE SULFATE 2 MG/ML
1 INJECTION, SOLUTION INTRAMUSCULAR; INTRAVENOUS EVERY 4 HOURS PRN
Status: DISCONTINUED | OUTPATIENT
Start: 2024-06-07 | End: 2024-06-12

## 2024-06-07 RX ORDER — ONDANSETRON 4 MG/1
4 TABLET, ORALLY DISINTEGRATING ORAL EVERY 6 HOURS PRN
Status: DISCONTINUED | OUTPATIENT
Start: 2024-06-07 | End: 2024-06-12

## 2024-06-07 RX ORDER — NICOTINE POLACRILEX 4 MG
15 LOZENGE BUCCAL
Status: DISCONTINUED | OUTPATIENT
Start: 2024-06-07 | End: 2024-06-11

## 2024-06-07 RX ORDER — INSULIN LISPRO 100 [IU]/ML
2-9 INJECTION, SOLUTION INTRAVENOUS; SUBCUTANEOUS
Status: DISCONTINUED | OUTPATIENT
Start: 2024-06-07 | End: 2024-06-07

## 2024-06-07 RX ORDER — INSULIN LISPRO 100 [IU]/ML
3-14 INJECTION, SOLUTION INTRAVENOUS; SUBCUTANEOUS EVERY 6 HOURS SCHEDULED
Status: DISCONTINUED | OUTPATIENT
Start: 2024-06-07 | End: 2024-06-09

## 2024-06-07 RX ADMIN — IPRATROPIUM BROMIDE AND ALBUTEROL SULFATE 3 ML: .5; 3 SOLUTION RESPIRATORY (INHALATION) at 19:05

## 2024-06-07 RX ADMIN — ONDANSETRON 4 MG: 2 INJECTION INTRAMUSCULAR; INTRAVENOUS at 13:11

## 2024-06-07 RX ADMIN — INSULIN LISPRO 6 UNITS: 100 INJECTION, SOLUTION INTRAVENOUS; SUBCUTANEOUS at 09:01

## 2024-06-07 RX ADMIN — BUDESONIDE 0.5 MG: 0.5 INHALANT RESPIRATORY (INHALATION) at 08:05

## 2024-06-07 RX ADMIN — SODIUM CHLORIDE 100 ML/HR: 900 INJECTION, SOLUTION INTRAVENOUS at 22:16

## 2024-06-07 RX ADMIN — DEXAMETHASONE SODIUM PHOSPHATE 4 MG: 4 INJECTION, SOLUTION INTRAMUSCULAR; INTRAVENOUS at 04:47

## 2024-06-07 RX ADMIN — HYDROMORPHONE HYDROCHLORIDE 0.5 MG: 1 INJECTION, SOLUTION INTRAMUSCULAR; INTRAVENOUS; SUBCUTANEOUS at 13:11

## 2024-06-07 RX ADMIN — IPRATROPIUM BROMIDE AND ALBUTEROL SULFATE 3 ML: .5; 3 SOLUTION RESPIRATORY (INHALATION) at 08:00

## 2024-06-07 RX ADMIN — MORPHINE SULFATE 2 MG: 2 INJECTION, SOLUTION INTRAMUSCULAR; INTRAVENOUS at 22:22

## 2024-06-07 RX ADMIN — INSULIN LISPRO 5 UNITS: 100 INJECTION, SOLUTION INTRAVENOUS; SUBCUTANEOUS at 18:09

## 2024-06-07 RX ADMIN — HYDROMORPHONE HYDROCHLORIDE 0.25 MG: 1 INJECTION, SOLUTION INTRAMUSCULAR; INTRAVENOUS; SUBCUTANEOUS at 04:47

## 2024-06-07 RX ADMIN — DEXAMETHASONE SODIUM PHOSPHATE 4 MG: 4 INJECTION, SOLUTION INTRAMUSCULAR; INTRAVENOUS at 22:22

## 2024-06-07 RX ADMIN — IPRATROPIUM BROMIDE AND ALBUTEROL SULFATE 3 ML: .5; 3 SOLUTION RESPIRATORY (INHALATION) at 14:55

## 2024-06-07 RX ADMIN — HYDROMORPHONE HYDROCHLORIDE 0.25 MG: 1 INJECTION, SOLUTION INTRAMUSCULAR; INTRAVENOUS; SUBCUTANEOUS at 09:01

## 2024-06-07 RX ADMIN — Medication 10 ML: at 21:13

## 2024-06-07 RX ADMIN — MORPHINE SULFATE 2 MG: 2 INJECTION, SOLUTION INTRAMUSCULAR; INTRAVENOUS at 18:09

## 2024-06-07 RX ADMIN — Medication 10 ML: at 09:03

## 2024-06-07 RX ADMIN — IPRATROPIUM BROMIDE AND ALBUTEROL SULFATE 3 ML: .5; 3 SOLUTION RESPIRATORY (INHALATION) at 11:28

## 2024-06-07 RX ADMIN — INSULIN GLARGINE 10 UNITS: 100 INJECTION, SOLUTION SUBCUTANEOUS at 00:29

## 2024-06-07 RX ADMIN — DEXAMETHASONE SODIUM PHOSPHATE 4 MG: 4 INJECTION, SOLUTION INTRAMUSCULAR; INTRAVENOUS at 18:09

## 2024-06-07 RX ADMIN — BUDESONIDE 0.5 MG: 0.5 INHALANT RESPIRATORY (INHALATION) at 19:00

## 2024-06-07 RX ADMIN — INSULIN LISPRO 6 UNITS: 100 INJECTION, SOLUTION INTRAVENOUS; SUBCUTANEOUS at 12:52

## 2024-06-07 RX ADMIN — DEXAMETHASONE SODIUM PHOSPHATE 4 MG: 4 INJECTION, SOLUTION INTRAMUSCULAR; INTRAVENOUS at 11:45

## 2024-06-07 RX ADMIN — SODIUM CHLORIDE 100 ML/HR: 900 INJECTION, SOLUTION INTRAVENOUS at 12:12
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
## 2024-06-07 NOTE — THERAPY EVALUATION
Patient Name: Gurinder Amezcua  : 1950    MRN: [id number]                              Today's Date: 2024       Admit Date: 2024    Visit Dx:     ICD-10-CM ICD-9-CM   1. Cervical myelopathy  G95.9 721.1     Patient Active Problem List   Diagnosis    Type 2 diabetes mellitus with diabetic polyneuropathy, with long-term current use of insulin    Dyslipidemia    Acute deep vein thrombosis (DVT) of femoral vein of right lower extremity    Arthritis    Esophageal obstruction    Other esophagitis without bleeding    Cerebral infarction, unspecified    Chronic back pain    Chronic fatigue    Degenerative disc disease, lumbar    Diaphragmatic hernia without obstruction or gangrene    Dysphagia    Encounter for screening colonoscopy    Lumbar disc disease with radiculopathy    Lower leg DVT (deep venous thromboembolism), chronic, right    History of iron deficiency    History of DVT (deep vein thrombosis)    Herniated nucleus pulposus of lumbosacral region    Hearing loss    Gastro-esophageal reflux disease without esophagitis    Gastroesophageal reflux disease without esophagitis    Gastric foreign body    Ureteral stone with hydronephrosis    Trigeminal neuralgia    Recurrent kidney stones    Nephrolithiasis    Polyp of stomach and duodenum    Mixed hyperlipidemia    Major depressive disorder, single episode, unspecified    Lumbosacral spondylosis without myelopathy    Maxillary pain    Vitamin D deficiency    Type 2 diabetes mellitus with hyperglycemia    Coronary artery disease of native artery of native heart with stable angina pectoris    Abnormal nuclear stress test    Cervical myelopathy    S/P cervical spinal fusion     Past Medical History:   Diagnosis Date    Ankylosing spondylitis of site in spine     Spine surgery    Anxiety     Carotid artery occlusion     Cataracts, bilateral 2022    Cervical disc disorder     Chronic pain disorder     Coronary artery disease of native artery of native  heart with stable angina pectoris 12/28/2023    Deep vein thrombosis 2018 approx    none since    Depression 6/19    Death of wife    Diabetes mellitus 2017 approx    Type 2    Headache Always    Heart attack 06/17/2021    Low back pain     Lumbosacral disc disease     Migraine Unknown    Mixed hyperlipidemia 03/08/2018    Neck pain Feb 2023    Peripheral neuropathy     Rheumatoid arthritis Unknown    Shingles     Spinal stenosis     Thoracic disc disorder     Trigeminal neuralgia     Ureteral stone with hydronephrosis 11/24/2016     Past Surgical History:   Procedure Laterality Date    ANTERIOR CERVICAL DISCECTOMY W/ FUSION N/A 6/6/2024    Procedure: Cervical 3-5 anterior cervical discectomy and fusion;  Surgeon: Bang Mirza IV, MD;  Location: [facility] MAIN OR;  Service: Neurosurgery;  Laterality: N/A;    BACK SURGERY  2019    4 rods in back    BRAIN SURGERY      CARDIAC CATHETERIZATION  06/17/2021    CARDIAC CATHETERIZATION N/A 01/02/2024    Procedure: Left Heart Cath with angiogram;  Surgeon: Akin Bronw MD;  Location: [facility] CATH INVASIVE LOCATION;  Service: Cardiovascular;  Laterality: N/A;    CARDIAC CATHETERIZATION N/A 01/02/2024    Procedure: Left ventriculography;  Surgeon: Akin Brown MD;  Location: [facility] CATH INVASIVE LOCATION;  Service: Cardiovascular;  Laterality: N/A;    CAROTID STENT      CORONARY STENT PLACEMENT  06/17/2021    CRANIOTOMY      CYBERKNIFE      ENDOSCOPY      EPIDURAL BLOCK      HERNIA REPAIR      KNEE ARTHROSCOPY      LAMINECTOMY      NECK SURGERY  Unknown    SPINAL CORD STIMULATOR IMPLANT  Previously    SPINAL FUSION  Unknown    SPINE SURGERY      TRIGEMINAL NERVE DECOMPRESSION      TRIGGER POINT INJECTION        General Information       Row Name 06/07/24 1656          OT Time and Intention    Document Type evaluation  -LS     Mode of Treatment occupational therapy  -LS       Row Name 06/07/24 1656          General Information    Patient Profile Reviewed yes  -LS     Prior Level  of Function independent:;ADL's;driving;all household mobility;community mobility  -     Existing Precautions/Restrictions fall;spinal  -     Barriers to Rehab medically complex  -       Row Name 06/07/24 1656          Living Environment    People in Home alone  -       Row Name 06/07/24 1656          Home Main Entrance    Number of Stairs, Main Entrance two  -LS     Stair Railings, Main Entrance railings safe and in good condition  -       Row Name 06/07/24 1656          Stairs Within Home, Primary    Number of Stairs, Within Home, Primary none  -       Row Name 06/07/24 1656          Cognition    Orientation Status (Cognition) oriented x 4  -       Row Name 06/07/24 1656          Safety Issues, Functional Mobility    Impairments Affecting Function (Mobility) balance;endurance/activity tolerance;other (see comments);pain;strength  -               User Key  (r) = Recorded By, (t) = Taken By, (c) = Cosigned By      Initials Name Provider Type     Jose Armas OT Occupational Therapist                     Mobility/ADL's       Row Name 06/07/24 1657          Bed Mobility    Bed Mobility sit-supine  -     Sit-Supine [place] (Bed Mobility) standby assist  -     Assistive Device (Bed Mobility) bed rails;head of bed elevated  -       Row Name 06/07/24 1657          Transfers    Transfers sit-stand transfer;bed-chair transfer  -       Row Name 06/07/24 1657          Bed-Chair Transfer    Bed-Chair [place] (Transfers) standby assist;verbal cues  -     Assistive Device (Bed-Chair Transfers) walker, front-wheeled  -LS       Row Name 06/07/24 1657          Sit-Stand Transfer    Sit-Stand [place] (Transfers) standby assist;verbal cues  -     Assistive Device (Sit-Stand Transfers) walker, front-wheeled  -LS       Row Name 06/07/24 1657          Functional Mobility    Functional Mobility- Ind. Level standby assist  -     Functional Mobility- Device walker, front-wheeled  -LS      Patient was able to Ambulate yes  -       Row Name 06/07/24 1657          Activities of Daily Living    BADL Assessment/Intervention other (see comments)  MAintains ADL IND  -               User Key  (r) = Recorded By, (t) = Taken By, (c) = Cosigned By      Initials Name Provider Type    Jose Stout OT Occupational Therapist                   Obj/Interventions       Row Name 06/07/24 1657          Sensory Assessment (Somatosensory)    Sensory Assessment (Somatosensory) sensation intact  -       Row Name 06/07/24 1657          Vision Assessment/Intervention    Visual Impairment/Limitations corrective lenses full-time  -       Row Name 06/07/24 1657          Range of Motion Comprehensive    General Range of Motion bilateral lower extremity ROM WFL  -       Row Name 06/07/24 1657          Strength Comprehensive (MMT)    General Manual Muscle Testing (MMT) Assessment no strength deficits identified  -       Row Name 06/07/24 1657          Balance    Balance Assessment sitting static balance;standing static balance;sitting dynamic balance;standing dynamic balance  -LS     Static Sitting Balance independent  -LS     Dynamic Sitting Balance independent  -LS     Position, Sitting Balance sitting edge of bed;unsupported  -LS     Static Standing Balance standby assist  -LS     Dynamic Standing Balance standby assist  -LS     Position/Device Used, Standing Balance supported  -               User Key  (r) = Recorded By, (t) = Taken By, (c) = Cosigned By      Initials Name Provider Type    Jose Stout OT Occupational Therapist                   Goals/Plan    No documentation.                  Clinical Impression       [facility] Name 06/07/24 1658          Pain Assessment    Pretreatment Pain Rating 7/10  -LS     Posttreatment Pain Rating 7/10  -LS     Pain Location - Side/Orientation Right  -LS     Pain Location - face;neck  -LS     Pain Intervention(s) Repositioned  -       Row Name 06/07/24 1658           Plan of Care Review    Plan of Care Reviewed With patient  -LS     Outcome Evaluation 74-year-old male status post C5-6 ACDF many years ago with severe adjacent segment disease with central and foraminal stenosis and signs and symptoms of progressive radiculopathy and cervical myelopathy refractory to conservative measures. Pt decided on surgery, undergoing C3-C5 ACDF with Dr. Mirza on 6/6/24. PMH significant for carotid artery occlusion, DVT, depression, diabetes mellitus, hyperlipidemia, and peripheral neuropathy. Patient also has a history of refractory trigeminal neuralgia. At baseline, pt lives alone in [facility] with 2 MONICA with OLAMIDE railings, PLOF (I)/Mod(I) with straight cane and/or Rwx or no AD. Pt reports he is still driving. Pt denies fall history. Pt reports he has grab bars in the bathroom with shower seat. This date, pt appears to be near his functional baseline with function and mobility. Using a RW, pt SBA for ambulation in candelario, completing stairs. He also retaind ADL IND at this time. OT will sign off.  -       Row Name 06/07/24 1658          Therapy Assessment/Plan (OT)    Criteria for Skilled Therapeutic Interventions Met (OT) no;no problems identified which require skilled intervention  -     Therapy Frequency (OT) evaluation only  -LS     Predicted Duration of Therapy Intervention (OT) eval only  -       Row Name 06/07/24 1658          Therapy Plan Review/Discharge Plan (OT)    Anticipated Discharge Disposition (OT) home  -       Row Name 06/07/24 1658          Vital Signs    O2 Delivery Pre Treatment room air  -LS     O2 Delivery Intra Treatment room air  -LS     O2 Delivery Post Treatment room air  -LS     Pre Patient Position Supine  -LS     Intra Patient Position Standing  -LS     Post Patient Position Sitting  -       Row Name 06/07/24 1658          Positioning and Restraints    Pre-Treatment Position sitting in chair/recliner  -LS     Post Treatment Position chair  -LS     In Chair  notified nsg;reclined;call light within reach;encouraged to call for assist;exit alarm on  -LS               User Key  (r) = Recorded By, (t) = Taken By, (c) = Cosigned By      Initials Name Provider Type    Jose Stout OT Occupational Therapist                   Outcome Measures       Row Name 06/07/24 1701          How much help from another is currently needed...    Putting on and taking off regular lower body clothing? 4  -LS     Bathing (including washing, rinsing, and drying) 4  -LS     Toileting (which includes using toilet bed pan or urinal) 4  -LS     Putting on and taking off regular upper body clothing 4  -LS     Taking care of personal grooming (such as brushing teeth) 4  -LS     Eating meals 4  -LS     AM-PAC 6 Clicks Score (OT) 24  -LS       Row Name 06/07/24 0800          How much help from another person do you currently need...    Turning from your back to your side while in flat bed without using bedrails? 3  -CC     Moving from lying on back to sitting on the side of a flat bed without bedrails? 3  -CC     Moving to and from a bed to a chair (including a wheelchair)? 2  -CC     Standing up from a chair using your arms (e.g., wheelchair, bedside chair)? 2  -CC     Climbing 3-5 steps with a railing? 2  -CC     To walk in hospital room? 2  -CC     AM-PAC 6 Clicks Score (PT) 14  -CC     Highest Level of Mobility Goal 4 --> Transfer to chair/commode  -CC       Row Name 06/07/24 1701          Functional Assessment    Outcome Measure Options AM-PAC 6 Clicks Daily Activity (OT)  -LS               User Key  (r) = Recorded By, (t) = Taken By, (c) = Cosigned By      Initials Name Provider Type    Jose Stout OT Occupational Therapist    CC Velvet Tavera LPN Licensed Nurse                    Occupational Therapy Education       Title: PT OT SLP Therapies (Done)       Topic: Occupational Therapy (Done)       Point: ADL training (Done)       Description:   Instruct learner(s) on proper safety  adaptation and remediation techniques during self care or transfers.   Instruct in proper use of assistive devices.                  Learning Progress Summary             Patient Acceptance, E,TB, VU by  at 6/7/2024 1701                         Point: Precautions (Done)       Description:   Instruct learner(s) on prescribed precautions during self-care and functional transfers.                  Learning Progress Summary             Patient Acceptance, E,TB, VU by  at 6/7/2024 1701                         Point: Body mechanics (Done)       Description:   Instruct learner(s) on proper positioning and spine alignment during self-care, functional mobility activities and/or exercises.                  Learning Progress Summary             Patient Acceptance, E,TB, VU by  at 6/7/2024 1701                                         User Key       Initials Effective Dates Name Provider Type Discipline     09/22/22 -  Jose Armas OT Occupational Therapist OT                  OT Recommendation and Plan  Therapy Frequency (OT): evaluation only  Plan of Care Review  Plan of Care Reviewed With: patient  Outcome Evaluation: 74-year-old male status post C5-6 ACDF many years ago with severe adjacent segment disease with central and foraminal stenosis and signs and symptoms of progressive radiculopathy and cervical myelopathy refractory to conservative measures. Pt decided on surgery, undergoing C3-C5 ACDF with Dr. Mirza on 6/6/24. PMH significant for carotid artery occlusion, DVT, depression, diabetes mellitus, hyperlipidemia, and peripheral neuropathy. Patient also has a history of refractory trigeminal neuralgia. At baseline, pt lives alone in [facility] with 2 [place] with OLAMIDE railings, PLOF (I)/Mod(I) with straight cane and/or Rwx or no AD. Pt reports he is still driving. Pt denies fall history. Pt reports he has grab bars in the bathroom with shower seat. This date, pt appears to be near his functional baseline with function and  mobility. Using a RW, pt SBA for ambulation in candelario, completing stairs. He also retaind ADL IND at this time. OT will sign off.     Time Calculation:         Time Calculation- OT       Row Name 06/07/24 1702             Time Calculation- OT    OT Start Time 1327  -LS      OT Stop Time 1400  -LS      OT Time Calculation (min) 33 min  -LS      OT Received On 06/07/24  -LS         Untimed Charges    OT Eval/Re-eval Minutes 33  -LS         Total Minutes    Untimed Charges Total Minutes 33  -LS       Total Minutes 33  -LS                User Key  (r) = Recorded By, (t) = Taken By, (c) = Cosigned By      Initials Name Provider Type    Jose Stout OT Occupational Therapist                  Therapy Charges for Today       Code Description Service Date Service Provider Modifiers Qty    [id number] HC OT EVAL LOW COMPLEXITY 4 6/7/2024 Jose Armas OT GO 1                 Jose Armas OT  6/7/2024
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
## 2024-06-07 NOTE — PLAN OF CARE
Goal Outcome Evaluation:              Outcome Evaluation: Pt resting in bed. Pain controlled per MAR. Remains NPO due to failed swallow study. Plan of care on going.

## 2024-06-07 NOTE — PROGRESS NOTES
[facility] MEDICINE SERVICE  DAILY PROGRESS NOTE    NAME: Gurinder Amezcua  : 1950  MRN: [id number]      LOS: 0 days     PROVIDER OF SERVICE: Timothy Duane Brammell, MD    Chief Complaint: Cervical myelopathy    Subjective:     Interval History:  History taken from: patient  Patient Complaints: Patient primarily with pain control issues.  He has a longstanding history of right-sided trigeminal neuralgia being followed at [facility].  He states that he has tried multiple medications as well as surgical procedures in the past.  He now has his anterior throat swelling in association with his cervical laminectomy and has been began on Decadron with elevated blood sugars.  He denies any severe shortness of breath.  He is tolerating his oral secretions at present.  He remains NPO.  He denies any gastric or bowel issues.  Had bowel movement.  Urinating without issue.  Denies any other additional acute issues.      Review of Systems:   Review of Systems   All other systems reviewed and are negative.      Objective:     Vital Signs  Temp:  [97.4 °F (36.3 °C)-98.7 °F (37.1 °C)] 98.4 °F (36.9 °C)  Heart Rate:  [] 102  Resp:  [11-20] 20  BP: (121-174)/(68-99) 159/74  Flow (L/min):  [1-2] 1   Body mass index is 29.3 kg/m².    Physical Exam  Physical Exam  Vitals reviewed.   Constitutional:       General: He is not in acute distress.     Appearance: Normal appearance.   HENT:      Head:      Comments: Postop neck with bandage in place anteriorly  Cardiovascular:      Rate and Rhythm: Normal rate.   Pulmonary:      Effort: Pulmonary effort is normal.      Breath sounds: Normal breath sounds.   Abdominal:      General: Bowel sounds are normal.      Palpations: Abdomen is soft.   Musculoskeletal:         General: No swelling.   Neurological:      Mental Status: He is alert. Mental status is at baseline.      Comments: Full exam deferred to neurosurgery         Scheduled Meds   amLODIPine, 10 mg,  Oral, Q24H  atorvastatin, 80 mg, Oral, Daily  budesonide, 0.5 mg, Nebulization, BID - RT  fentaNYL, 1 patch, Transdermal, Q72H   And  [START ON 6/8/2024] Check Fentanyl Patch Placement, 1 each, Does not apply, Q12H  dexAMETHasone, 4 mg, Intravenous, Q6H  insulin glargine, 25 Units, Subcutaneous, Nightly  insulin lispro, 2-9 Units, Subcutaneous, 4x Daily With Meals & Nightly  [Held by provider] insulin lispro, 5 Units, Subcutaneous, TID With Meals  ipratropium-albuterol, 3 mL, Nebulization, 4x Daily - RT  metoprolol succinate XL, 25 mg, Oral, Daily  pantoprazole, 40 mg, Oral, Daily  sodium chloride, 10 mL, Intravenous, Q12H       PRN Meds     acetaminophen **OR** acetaminophen **OR** acetaminophen    senna-docusate sodium **AND** polyethylene glycol **AND** bisacodyl **AND** bisacodyl    cyclobenzaprine    dextrose    dextrose    glucagon (human recombinant)    hydrALAZINE    HYDROcodone-acetaminophen    HYDROmorphone **AND** naloxone    melatonin    ondansetron ODT **OR** ondansetron    Pharmacy Consult - Steroid Insulin Protocol    sodium chloride    sodium chloride   Infusions  Pharmacy Consult - Steroid Insulin Protocol,   sodium chloride, 100 mL/hr, Last Rate: 100 mL/hr (06/07/24 1212)          Diagnostic Data    Results from last 7 days   Lab Units 06/07/24  0006   WBC 10*3/mm3 6.27   HEMOGLOBIN g/dL 13.5   HEMATOCRIT % 40.6   PLATELETS 10*3/mm3 134*   GLUCOSE mg/dL 282*   CREATININE mg/dL 0.83   BUN mg/dL 16   SODIUM mmol/L 135*   POTASSIUM mmol/L 4.6   ANION GAP mmol/L 11.5       No radiology results for the last day      I reviewed the patient's new clinical results.    Assessment:    Postsurgical cervical surgery  Trigeminal neuralgia  Pain control  Dysphagia postop.  Insulin requiring diabetes with elevated blood sugar       Plan.  Discussed his pain control.  Will try transdermal fentanyl patch along with his as needed medication.  Discussed risk of oversedation.  Neurosurgery as per is a swallowing  issues patient was noted with U- 300  insulin at home.  Active and Resolved Problems  Active Hospital Problems    Diagnosis  POA    **Cervical myelopathy [G95.9]  Unknown    S/P cervical spinal fusion [Z98.1]  Not Applicable      Resolved Hospital Problems   No resolved problems to display.           DVT prophylaxis:  Mechanical DVT prophylaxis orders are present.         Code status is   Code Status and Medical Interventions:   Ordered at: 06/07/24 1227     Level Of Support Discussed With:    Patient     Code Status (Patient has no pulse and is not breathing):    CPR (Attempt to Resuscitate)     Medical Interventions (Patient has pulse or is breathing):    Full Support       Plan for disposition:home in 2 days    Time: 30 minutes    Signature: Electronically signed by Timothy Duane Brammell, MD, 06/07/24, 13:34 EDT.  [facility], operated by [facility] Hospitalist Team
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
## 2024-06-07 NOTE — PLAN OF CARE
Goal Outcome Evaluation:   Patient was seen for dysphagia evaluation per MD order. Patient had cervial 3-5 anterior fusion. Patient has history of GERD, recurrent laryngeal nerve injury permanent hoarseness as a result. Patient denies any swallowing issues before surgery. Patient denies any difficulty with taking medication either. Patient has his own natural teeth with several missing. Oral mechanism examination revealed patient demonstrated full ROM and mobility of lingual and labial structures. Palatal movement was present. Noted diminished gag reflex. Properly positioned patient upright in bed near 90 degree hip flexion prior to trials. Provided trials of ice chip x 1. Patient displayed coughing x 1. Patient exhibited effortful swallow. Provided 1/4 tsp of water. Patient coughed x 2. Patient displayed effortful swallow as he is having much difficulty with swallowing. Suspect patient continues to have much swelling. It is recommmended that patient remain NPO. ST will continue ongoing reevaluation of swallow function as swelling dissipates. Patient may receive ice chips and/or small sips of water for oral gratification in accordance with [place] Protocol.  The rationale to recommend water when a PO diet cannot appropriately/functionally sustain nutrition (or if thickened liquids are prescribed due to aspiration of thin liquids) is because water is low risk for aspiration pna when compared to aspiration of food or other liquids.    Benefits of a water protocol include but are not limited to:     Oral gratification   Engagement of oropharyngeal swallow musculature   Decrease likelihood of dehydration      Guidelines for proper implementation include:    Thorough oral care prior to consuming water  Upright at 90 degree hip flexion  Small sips at slow rate  Monitor for any changes in respiratory status and discontinue if distress noted    The [employer] Water Protocol is a research based protocol established  in 1984.                              SLP Swallowing Diagnosis: functional oral phase, pharyngeal dysphagia (06/07/24 0900)

## 2024-06-07 NOTE — PLAN OF CARE
Goal Outcome Evaluation:  Plan of Care Reviewed With: patient           Outcome Evaluation: 74-year-old male status post C5-6 ACDF many years ago with severe adjacent segment disease with central and foraminal stenosis and signs and symptoms of progressive radiculopathy and cervical myelopathy refractory to conservative measures. Pt decided on surgery, undergoing C3-C5 ACDF with Dr. Mirza on 6/6/24. PMH significant for carotid artery occlusion, DVT, depression, diabetes mellitus, hyperlipidemia, and peripheral neuropathy. Patient also has a history of refractory trigeminal neuralgia. At baseline, pt lives alone in [facility] with 2 [place] with OLAMIDE railings, PLOF (I)/Mod(I) with straight cane and/or Rwx or no AD. Pt reports he is still driving. Pt denies fall history. Pt reports he has grab bars in the bathroom with shower seat. This date, pt appears to be near his functional baseline with function and mobility. Using a RW, pt SBA for ambulation in cand[place]ario, completing stairs. He also retaind ADL IND at this time. OT will sign off.      Anticipated Discharge Disposition (OT): home

## 2024-06-07 NOTE — CASE MANAGEMENT/SOCIAL WORK
Discharge Planning Assessment   [facility]     Patient Name: Gurinder Amezcua  MRN: [id number]  Today's Date: 6/7/2024    Admit Date: 6/6/2024    Plan: Home. Family can transport at discharge.   Discharge Needs Assessment       Row Name 06/07/24 1331       Living Environment    People in Home alone    Current Living Arrangements home    Potentially Unsafe Housing Conditions none    In the past 12 months has the electric, gas, oil, or water company threatened to shut off services in your home? No    Primary Care Provided by self    Provides Primary Care For no one    Family Caregiver if Needed child(mk), adult    Family Caregiver Names son, Horace    Quality of Family Relationships helpful;involved;supportive    Able to Return to Prior Arrangements yes       Resource/Environmental Concerns    Resource/Environmental Concerns none    Transportation Concerns none       Transportation Needs    In the past 12 months, has lack of transportation kept you from medical appointments or from getting medications? no    In the past 12 months, has lack of transportation kept you from meetings, work, or from getting things needed for daily living? No       Food Insecurity    Within the past 12 months, you worried that your food would run out before you got the money to buy more. Never true    Within the past 12 months, the food you bought just didn't last and you didn't have money to get more. Never true       Transition Planning    Patient/Family Anticipates Transition to home    Patient/Family Anticipated Services at Transition none    Transportation Anticipated car, drives self;family or friend will provide       Discharge Needs Assessment    Readmission Within the Last 30 Days no previous admission in last 30 days    Equipment Currently Used at Home cane, straight;other (see comments)  has RW however not currently using    Concerns to be Addressed care coordination/care conferences;discharge planning    Anticipated Changes Related  to Illness none    Equipment Needed After Discharge none    Provided Post Acute Provider List? N/A    N/A Provider List Comment denies dc needs.  Does not want home health or outpatient at this time.                   Discharge Plan       Row Name 06/07/24 1829       Plan    Plan Home. Family can transport at discharge.    Patient/Family in Agreement with Plan yes    Plan Comments Patient lives at home with alone.  Son, Horace lives about 10 and is able to stay with him at night if needed  . Patient does drive, family  will transport at discharge. Patient performs ADL. PCP and pharmacy confirmed. Denies financial assistance needs for medication and/or food. Denies HH and/or rehab needs.   I spoke with patient about home health and he declined.  Also spoke about OP PT and he stated he has used [facility] on [address] but felt like he wanted to follow up with Neurosurgery prior to starting any therapy.  DC barriers:  POD #1 anterior cervical fusion.  problems swallowing.  speech to eval in the morning 06.08.24, decadron                  Continued Care and Services - Admitted Since 6/6/2024    No active coordination exists for this encounter.       Expected Discharge Date and Time       Expected Discharge Date Expected Discharge Time    Jun 8, 2024            Demographic Summary       Row Name 06/07/24 1334       General Information    Admission Type other (see comments)  outpatient    Arrived From home    Referral Source admission list    Reason for Consult discharge planning    Preferred Language English       Contact Information    Permission Granted to Share Info With                    Functional Status       Row Name 06/07/24 133       Functional Status    Usual Activity Tolerance good    Current Activity Tolerance good       Functional Status, IADL    Medications independent    Meal Preparation independent    Housekeeping independent    Laundry independent    Shopping independent        Mental Status    General Appearance WDL WDL       Mental Status Summary    Recent Changes in Mental Status/Cognitive Functioning no changes                 Carrie Christian, RN

## 2024-06-08 LAB
GLUCOSE BLDC GLUCOMTR-MCNC: 177 MG/DL (ref 70–105)
GLUCOSE BLDC GLUCOMTR-MCNC: 207 MG/DL (ref 70–105)
GLUCOSE BLDC GLUCOMTR-MCNC: 219 MG/DL (ref 70–105)
GLUCOSE BLDC GLUCOMTR-MCNC: 222 MG/DL (ref 70–105)

## 2024-06-08 PROCEDURE — 94799 UNLISTED PULMONARY SVC/PX: CPT

## 2024-06-08 PROCEDURE — 82948 REAGENT STRIP/BLOOD GLUCOSE: CPT | Performed by: HOSPITALIST

## 2024-06-08 PROCEDURE — 25010000002 DEXAMETHASONE PER 1 MG: Performed by: NEUROLOGICAL SURGERY

## 2024-06-08 PROCEDURE — 25810000003 SODIUM CHLORIDE 0.9 % SOLUTION: Performed by: NEUROLOGICAL SURGERY

## 2024-06-08 PROCEDURE — 63710000001 INSULIN LISPRO (HUMAN) PER 5 UNITS: Performed by: HOSPITALIST

## 2024-06-08 PROCEDURE — A9270 NON-COVERED ITEM OR SERVICE: HCPCS | Performed by: HOSPITALIST

## 2024-06-08 PROCEDURE — 82948 REAGENT STRIP/BLOOD GLUCOSE: CPT

## 2024-06-08 PROCEDURE — 92526 ORAL FUNCTION THERAPY: CPT

## 2024-06-08 PROCEDURE — 63710000001 INSULIN GLARGINE PER 5 UNITS: Performed by: HOSPITALIST

## 2024-06-08 PROCEDURE — 25010000002 MORPHINE PER 10 MG: Performed by: HOSPITALIST

## 2024-06-08 RX ADMIN — MORPHINE SULFATE 2 MG: 2 INJECTION, SOLUTION INTRAMUSCULAR; INTRAVENOUS at 05:33

## 2024-06-08 RX ADMIN — Medication 10 ML: at 09:50

## 2024-06-08 RX ADMIN — INSULIN GLARGINE 25 UNITS: 100 INJECTION, SOLUTION SUBCUTANEOUS at 00:21

## 2024-06-08 RX ADMIN — INSULIN LISPRO 5 UNITS: 100 INJECTION, SOLUTION INTRAVENOUS; SUBCUTANEOUS at 12:25

## 2024-06-08 RX ADMIN — BUDESONIDE 0.5 MG: 0.5 INHALANT RESPIRATORY (INHALATION) at 07:12

## 2024-06-08 RX ADMIN — IPRATROPIUM BROMIDE AND ALBUTEROL SULFATE 3 ML: .5; 3 SOLUTION RESPIRATORY (INHALATION) at 11:04

## 2024-06-08 RX ADMIN — INSULIN LISPRO 5 UNITS: 100 INJECTION, SOLUTION INTRAVENOUS; SUBCUTANEOUS at 05:22

## 2024-06-08 RX ADMIN — DEXAMETHASONE SODIUM PHOSPHATE 4 MG: 4 INJECTION, SOLUTION INTRAMUSCULAR; INTRAVENOUS at 12:07

## 2024-06-08 RX ADMIN — SODIUM CHLORIDE 100 ML/HR: 900 INJECTION, SOLUTION INTRAVENOUS at 09:58

## 2024-06-08 RX ADMIN — BUDESONIDE 0.5 MG: 0.5 INHALANT RESPIRATORY (INHALATION) at 18:50

## 2024-06-08 RX ADMIN — DEXAMETHASONE SODIUM PHOSPHATE 4 MG: 4 INJECTION, SOLUTION INTRAMUSCULAR; INTRAVENOUS at 17:06

## 2024-06-08 RX ADMIN — Medication 10 ML: at 20:47

## 2024-06-08 RX ADMIN — IPRATROPIUM BROMIDE AND ALBUTEROL SULFATE 3 ML: .5; 3 SOLUTION RESPIRATORY (INHALATION) at 07:07

## 2024-06-08 RX ADMIN — DEXAMETHASONE SODIUM PHOSPHATE 4 MG: 4 INJECTION, SOLUTION INTRAMUSCULAR; INTRAVENOUS at 05:22

## 2024-06-08 RX ADMIN — INSULIN GLARGINE 20 UNITS: 100 INJECTION, SOLUTION SUBCUTANEOUS at 09:49

## 2024-06-08 RX ADMIN — MORPHINE SULFATE 2 MG: 2 INJECTION, SOLUTION INTRAMUSCULAR; INTRAVENOUS at 09:58

## 2024-06-08 RX ADMIN — INSULIN LISPRO 3 UNITS: 100 INJECTION, SOLUTION INTRAVENOUS; SUBCUTANEOUS at 18:57

## 2024-06-08 RX ADMIN — MORPHINE SULFATE 2 MG: 2 INJECTION, SOLUTION INTRAMUSCULAR; INTRAVENOUS at 17:05

## 2024-06-08 RX ADMIN — IPRATROPIUM BROMIDE AND ALBUTEROL SULFATE 3 ML: .5; 3 SOLUTION RESPIRATORY (INHALATION) at 14:45

## 2024-06-08 RX ADMIN — INSULIN GLARGINE 40 UNITS: 100 INJECTION, SOLUTION SUBCUTANEOUS at 20:46

## 2024-06-08 RX ADMIN — IPRATROPIUM BROMIDE AND ALBUTEROL SULFATE 3 ML: .5; 3 SOLUTION RESPIRATORY (INHALATION) at 18:51

## 2024-06-08 NOTE — PLAN OF CARE
Goal Outcome Evaluation:                        Pt aox4 ., npo due to failing swallow study, throat as boarder dressing clean dry and intact. Pt will remain npo until retest of swallow study. Pt on 2mg IV morphine. Care plan ongoing                       No

## 2024-06-08 NOTE — PROGRESS NOTES
NEUROSURGERY PROGRESS NOTE     LOS: 0 days   Patient Care Team:  Marissa Prasad MD as PCP - General (Family Medicine)  Akin Brown MD as Consulting Physician (Cardiology)    Chief Complaint:  No chief complaint on file.  Neck pain, swallowing difficulties    Subjective     Interval History: NAEO.  Pain much better controlled on morphine which was started yesterday afternoon but he still describes 6/10 pain around his incision.  He still is not able to swallow and barium x-ray is pending    While in the room and during my examination of the patient I wore a mask and eye protection.  I washed my hands before and after this patient encounter.  The patient was also wearing a mask.     History taken from: patient    Objective      Vital Signs  Temp:  [97.6 °F (36.4 °C)-98.4 °F (36.9 °C)] 97.6 °F (36.4 °C)  Heart Rate:  [] 68  Resp:  [14-24] 16  BP: (155-159)/(74-88) 156/88  Body mass index is 29.3 kg/m².    Intake/Output last 3 shifts:  I/O last 3 completed shifts:  In: 1200 [I.V.:1200]  Out: 3650 [Urine:3650]    Intake/Output this shift:  I/O this shift:  In: 0   Out: 650 [Urine:650]    Physical    GENERAL: No acute distress. Appears well nourished. Appears stated age.  HEENT: Atraumatic and normocephalic  CARDIO: RRR on monitoring. Pulses 2+  PULM: breathing nonlabored on room air  NEURO: AAOx3. EOMI. PERRL. CNi. Strength 5/5 in all extremities. Sensation intact to light touch. Reflexes 2+ throughout.  WOUND: Incision CDI    Results Review:  I reviewed the patient's new clinical results.    Labs:    Lab Results (last 24 hours)       Procedure Component Value Units Date/Time    POC Glucose Q6H [[id number]]  (Abnormal) Collected: 06/07/24 1214    Specimen: Blood Updated: 06/07/24 1217     Glucose 275 mg/dL      Comment: Serial Number: [id number]:  [id number]       POC Glucose Once [[id number]]  (Abnormal) Collected: 06/07/24 1759    Specimen: Blood Updated: 06/07/24 1804     Glucose 235 mg/dL       "Comment: Serial Number: [id number]:  [id number]       POC Glucose Q6H [[id number]]  (Abnormal) Collected: 06/08/24 0012    Specimen: Blood Updated: 06/08/24 0014     Glucose 222 mg/dL      Comment: Serial Number: [id number]:  [id number]       POC Glucose Once [[id number]]  (Abnormal) Collected: 06/08/24 0515    Specimen: Blood Updated: 06/08/24 0518     Glucose 219 mg/dL      Comment: Serial Number: [id number]:  [id number]               Imaging:    No new neuroimaging.    Current Medications:   Scheduled Meds:amLODIPine, 10 mg, Oral, Q24H  atorvastatin, 80 mg, Oral, Daily  budesonide, 0.5 mg, Nebulization, BID - RT  dexAMETHasone, 4 mg, Intravenous, Q6H  insulin glargine, 40 Units, Subcutaneous, Nightly  insulin lispro, 3-14 Units, Subcutaneous, Q6H  ipratropium-albuterol, 3 mL, Nebulization, 4x Daily - RT  metoprolol succinate XL, 25 mg, Oral, Daily  pantoprazole, 40 mg, Oral, Daily  sodium chloride, 10 mL, Intravenous, Q12H      Continuous Infusions:Pharmacy Consult - Steroid Insulin Protocol,   sodium chloride, 100 mL/hr, Last Rate: 100 mL/hr (06/08/24 0958)        Assessment & Plan       Cervical myelopathy    S/P cervical spinal fusion      Assessment/Plan:  Gurinder Amezcua is a 74 y.o. male with C3-4, C4-5 ACDF with postoperative swallowing difficulties    Neuro: Continue routine neuro checks.  Continue current pain and bowel regimen.  Continue working with speech therapy.  Barium swallow pending.  Wound/Incision: Continue dressing.  Cardio: Goal Normotensive  Pulm: Room air.  Activity: No restrictions to activity., Encourage ambulation., Work with PT/OT.  Diet: Advance diet as tolerated.  DVT Prophylaxis: Prophylactic Lovenox  GI Prophylaxis: Not indicated due to low risk.  Dispo:  Continue floor cares      Tee Farrell MD  06/08/24  11:59 EDT    \"Dictated utilizing Dragon dictation\".      "

## 2024-06-08 NOTE — THERAPY TREATMENT NOTE
Acute Care - Speech Language Pathology   Swallow Treatment Note CHRISTINA Fitzgerald     Patient Name: Gurinder Amezcua  : 1950  MRN: [id number]  Today's Date: 2024               Admit Date: 2024    Visit Dx:     ICD-10-CM ICD-9-CM   1. Cervical myelopathy  G95.9 721.1     Patient Active Problem List   Diagnosis    Type 2 diabetes mellitus with diabetic polyneuropathy, with long-term current use of insulin    Dyslipidemia    Acute deep vein thrombosis (DVT) of femoral vein of right lower extremity    Arthritis    Esophageal obstruction    Other esophagitis without bleeding    Cerebral infarction, unspecified    Chronic back pain    Chronic fatigue    Degenerative disc disease, lumbar    Diaphragmatic hernia without obstruction or gangrene    Dysphagia    Encounter for screening colonoscopy    Lumbar disc disease with radiculopathy    Lower leg DVT (deep venous thromboembolism), chronic, right    History of iron deficiency    History of DVT (deep vein thrombosis)    Herniated nucleus pulposus of lumbosacral region    Hearing loss    Gastro-esophageal reflux disease without esophagitis    Gastroesophageal reflux disease without esophagitis    Gastric foreign body    Ureteral stone with hydronephrosis    Trigeminal neuralgia    Recurrent kidney stones    Nephrolithiasis    Polyp of stomach and duodenum    Mixed hyperlipidemia    Major depressive disorder, single episode, unspecified    Lumbosacral spondylosis without myelopathy    Maxillary pain    Vitamin D deficiency    Type 2 diabetes mellitus with hyperglycemia    Coronary artery disease of native artery of native heart with stable angina pectoris    Abnormal nuclear stress test    Cervical myelopathy    S/P cervical spinal fusion     Past Medical History:   Diagnosis Date    Ankylosing spondylitis of site in spine     Spine surgery    Anxiety     Carotid artery occlusion     Cataracts, bilateral 2022    Cervical disc disorder     Chronic pain disorder      Coronary artery disease of native artery of native heart with stable angina pectoris 12/28/2023    Deep vein thrombosis 2018 approx    none since    Depression 6/19    Death of wife    Diabetes mellitus 2017 approx    Type 2    Headache Always    Heart attack 06/17/2021    Low back pain     Lumbosacral disc disease     Migraine Unknown    Mixed hyperlipidemia 03/08/2018    Neck pain Feb 2023    Peripheral neuropathy     Rheumatoid arthritis Unknown    Shingles     Spinal stenosis     Thoracic disc disorder     Trigeminal neuralgia     Ureteral stone with hydronephrosis 11/24/2016     Past Surgical History:   Procedure Laterality Date    ANTERIOR CERVICAL DISCECTOMY W/ FUSION N/A 6/6/2024    Procedure: Cervical 3-5 anterior cervical discectomy and fusion;  Surgeon: Bang Mirza IV, MD;  Location: [facility] MAIN OR;  Service: Neurosurgery;  Laterality: N/A;    BACK SURGERY  2019    4 rods in back    BRAIN SURGERY      CARDIAC CATHETERIZATION  06/17/2021    CARDIAC CATHETERIZATION N/A 01/02/2024    Procedure: Left Heart Cath with angiogram;  Surgeon: Akin Brown MD;  Location: [facility] CATH INVASIVE LOCATION;  Service: Cardiovascular;  Laterality: N/A;    CARDIAC CATHETERIZATION N/A 01/02/2024    Procedure: Left ventriculography;  Surgeon: Akin Brown MD;  Location: [facility] CATH INVASIVE LOCATION;  Service: Cardiovascular;  Laterality: N/A;    CAROTID STENT      CORONARY STENT PLACEMENT  06/17/2021    CRANIOTOMY      CYBERKNIFE      ENDOSCOPY      EPIDURAL BLOCK      HERNIA REPAIR      KNEE ARTHROSCOPY      LAMINECTOMY      NECK SURGERY  Unknown    SPINAL CORD STIMULATOR IMPLANT  Previously    SPINAL FUSION  Unknown    SPINE SURGERY      TRIGEMINAL NERVE DECOMPRESSION      TRIGGER POINT INJECTION           EDUCATION  The patient has been educated in the following areas:   NPO rationale.        SLP GOALS       Row Name 06/08/24 1400       (LTG) Swallow    (LTG) Swallow Patient will tolerate safest and least  "restrictive diet without complications from aspiration.  -MS    [place] (Swallow Long Term Goal) independently (over 90% accuracy)  -MS    Time Frame (Swallow Long Term Goal) by discharge  -MS    Progress/Outcomes (Swallow Long Term Goal) continuing progress toward goal  -MS       (STG) Swallow 1    (STG) Swallow 1 Patient will participate in ongoing reevaluation clinically and including instrumental assessment if indicated in anticipation of initiation of  PO diet.  -MS    [place] (Swallow Short Term Goal 1) independently (over 90% accuracy)  -MS    Time Frame (Swallow Short Term Goal 1) by discharge  -MS    Progress/Outcomes (Swallow Short Term Goal 1) continuing progress toward goal  -MS    Comment (Swallow Short Term Goal 1) Attempted re-evaluation of swallow this date. Upon room entry, pt was awake lying in bed. Currently NPO due to severe swallowing difficulty post cervical surgery. Pt was oriented x5. Pt was still very swollen in neck area. Verbalized he has had no water since Wednesday and waiting for his VFSS. SLP educated on FWP w/ very small amount and waiting for swelling to go down before VFSS. Pt agreeable to water via toothette. Pt positioned himself to sitting at 90 degrees in bed. Throat clear x2 during trial of water via toothette and delayed swallow due to pain. Pt asked if he could have a sip from the cup. Pt able to hold cup and take a sip. He demonstrated delayed swallow and coughing right after the sip. He verbalized \"it hurts so bad to swallow\". SLP educated on sticking to having water via toothette for now, due to pain of swallowing and coughing. Left water and toothette at bedside for him to use. Told pt we would continue to follow and check on him. Pt verbalized agreement.   -MS     SLP Recommendation and Plan:     It is recommended that the patient continue to remain NPO with the exception of water VIA TOOTHETTE and very small sips only as per the [place] Water Protocol (see " below).   He should have frequent oral care  ST will continue to follow as per current plan of care and with additional goals/recommendations as indicated     The rationale to recommend water/ice chips when a PO diet cannot appropriately/functionally sustain nutrition is because water is low risk for aspiration pna when compared to aspiration of food or other liquids.       Benefits of a water protocol include but are not limited to:      Oral gratification   Engagement of oropharyngeal swallow musculature   Decrease likelihood of dehydration       Guidelines for proper implementation include:  Thorough oral care prior to consuming water  Upright at 90 degree hip flexion  Small sips at slow rate     Monitor for any changes in respiratory status and discontinue if distress noted     The [place] [employer] Water Protocol is a research based protocol established in 1984.    MS              User Key  (r) = Recorded By, (t) = Taken By, (c) = Cosigned By      Initials Name Provider Type    Tee Singleton, SLP    Speech and Language Pathologist                            Tee Rodriguez, RONEY  6/8/2024

## 2024-06-08 NOTE — PROGRESS NOTES
[facility] MEDICINE SERVICE  DAILY PROGRESS NOTE    NAME: Gurinder Amezcua  : 1950  MRN: [id number]      LOS: 0 days     PROVIDER OF SERVICE: Timothy Duane Brammell, MD    Chief Complaint: Cervical myelopathy    Subjective:     Interval History:  History taken from: patient  Patient Complaints: Patient with pain improved with morphine of his face.  Still has some postsurgical pain.  Not been cleared for oral intake.  Not had bowel movement.  Denies any shortness of breath.  Denies any other additional acute issues.      Review of Systems:   Review of Systems    Objective:     Vital Signs  Temp:  [97.6 °F (36.4 °C)-98.1 °F (36.7 °C)] 97.8 °F (36.6 °C)  Heart Rate:  [] 90  Resp:  [14-24] 17  BP: (151-157)/(81-88) 151/87   Body mass index is 29.3 kg/m².    Physical Exam  Physical Exam    Scheduled Meds   amLODIPine, 10 mg, Oral, Q24H  atorvastatin, 80 mg, Oral, Daily  budesonide, 0.5 mg, Nebulization, BID - RT  dexAMETHasone, 4 mg, Intravenous, Q6H  insulin glargine, 40 Units, Subcutaneous, Nightly  insulin lispro, 3-14 Units, Subcutaneous, Q6H  ipratropium-albuterol, 3 mL, Nebulization, 4x Daily - RT  metoprolol succinate XL, 25 mg, Oral, Daily  pantoprazole, 40 mg, Oral, Daily  sodium chloride, 10 mL, Intravenous, Q12H       PRN Meds     acetaminophen **OR** acetaminophen **OR** acetaminophen    senna-docusate sodium **AND** polyethylene glycol **AND** bisacodyl **AND** bisacodyl    cyclobenzaprine    dextrose    dextrose    glucagon (human recombinant)    hydrALAZINE    HYDROcodone-acetaminophen    melatonin    Morphine **OR** Morphine    [DISCONTINUED] HYDROmorphone **AND** naloxone    ondansetron ODT **OR** ondansetron    Pharmacy Consult - Steroid Insulin Protocol    sodium chloride    sodium chloride   Infusions  Pharmacy Consult - Steroid Insulin Protocol,   sodium chloride, 100 mL/hr, Last Rate: 100 mL/hr (24 0958)          Diagnostic Data    Results from last 7 days   Lab Units  06/07/24  0006   WBC 10*3/mm3 6.27   HEMOGLOBIN g/dL 13.5   HEMATOCRIT % 40.6   PLATELETS 10*3/mm3 134*   GLUCOSE mg/dL 282*   CREATININE mg/dL 0.83   BUN mg/dL 16   SODIUM mmol/L 135*   POTASSIUM mmol/L 4.6   ANION GAP mmol/L 11.5       No radiology results for the last day          Assessment:    Postsurgical from cervical spinal surgery  Trigeminal neuralgia  Pain control  Dysphagia postop  Insulin requiring diabetes with elevated blood sugar on steroids   Plan: Increased insulin regimen with additional long-acting insulin this morning.  Follow-up pain control.  Follow-up with speech when directed by neurosurgery.  No other acute issues.      Active and Resolved Problems  Active Hospital Problems    Diagnosis  POA    **Cervical myelopathy [G95.9]  Unknown    S/P cervical spinal fusion [Z98.1]  Not Applicable      Resolved Hospital Problems   No resolved problems to display.           DVT prophylaxis:  Mechanical DVT prophylaxis orders are present.         Code status is   Code Status and Medical Interventions:   Ordered at: 06/07/24 1227     Level Of Support Discussed With:    Patient     Code Status (Patient has no pulse and is not breathing):    CPR (Attempt to Resuscitate)     Medical Interventions (Patient has pulse or is breathing):    Full Support       Plan for disposition:home in 2 days    Time: 30 minutes    Signature: Electronically signed by Timothy Duane Brammell, MD, 06/08/24, 13:19 EDT.  [facility] Hospitalist Team

## 2024-06-08 NOTE — PLAN OF CARE
"Goal Outcome Evaluation:    Attempted re-evaluation of swallow this date. Upon room entry, pt was awake lying in bed. Currently NPO due to severe swallowing difficulty post cervical surgery. Pt was oriented x5. Pt was still very swollen in neck area. Verbalized he has had no water since Wednesday and waiting for his VFSS. SLP educated on FWP w/ very small amount and waiting for swelling to go down before VFSS. Pt agreeable to water via toothette. Pt positioned himself to sitting at 90 degrees in bed. Throat clear x2 during trial of water via toothette and delayed swallow due to pain. Pt asked if he could have a sip from the cup. Pt able to hold cup and take a sip. He demonstrated delayed swallow and coughing right after the sip. He verbalized \"it hurts so bad to swallow\". SLP educated on sticking to having water via toothette for now, due to pain of swallowing and coughing. Left water and toothette at bedside for him to use. Told pt we would continue to follow and check on him. Pt verbalized agreement.   -MS     SLP Recommendation and Plan:     It is recommended that the patient continue to remain NPO with the exception of water VIA TOOTHETTE and very small sips only as per the [place] Water Protocol (see below).   He should have frequent oral care  ST will continue to follow as per current plan of care and with additional goals/recommendations as indicated     The rationale to recommend water/ice chips when a PO diet cannot appropriately/functionally sustain nutrition is because water is low risk for aspiration pna when compared to aspiration of food or other liquids.       Benefits of a water protocol include but are not limited to:      Oral gratification   Engagement of oropharyngeal swallow musculature   Decrease likelihood of dehydration       Guidelines for proper implementation include:  Thorough oral care prior to consuming water  Upright at 90 degree hip flexion  Small sips at slow rate     Monitor for any " changes in respiratory status and discontinue if distress noted     The [place] Free Water Protocol is a research based protocol established in 1984.    MS

## 2024-06-09 LAB
GLUCOSE BLDC GLUCOMTR-MCNC: 175 MG/DL (ref 70–105)
GLUCOSE BLDC GLUCOMTR-MCNC: 178 MG/DL (ref 70–105)
GLUCOSE BLDC GLUCOMTR-MCNC: 192 MG/DL (ref 70–105)
GLUCOSE BLDC GLUCOMTR-MCNC: 198 MG/DL (ref 70–105)

## 2024-06-09 PROCEDURE — 92526 ORAL FUNCTION THERAPY: CPT

## 2024-06-09 PROCEDURE — 82948 REAGENT STRIP/BLOOD GLUCOSE: CPT | Performed by: HOSPITALIST

## 2024-06-09 PROCEDURE — 94761 N-INVAS EAR/PLS OXIMETRY MLT: CPT

## 2024-06-09 PROCEDURE — 63710000001 INSULIN LISPRO (HUMAN) PER 5 UNITS: Performed by: HOSPITALIST

## 2024-06-09 PROCEDURE — 25810000003 SODIUM CHLORIDE 0.9 % SOLUTION: Performed by: NEUROLOGICAL SURGERY

## 2024-06-09 PROCEDURE — 94799 UNLISTED PULMONARY SVC/PX: CPT

## 2024-06-09 PROCEDURE — 94664 DEMO&/EVAL PT USE INHALER: CPT

## 2024-06-09 PROCEDURE — 82948 REAGENT STRIP/BLOOD GLUCOSE: CPT

## 2024-06-09 PROCEDURE — 63710000001 INSULIN GLARGINE PER 5 UNITS: Performed by: HOSPITALIST

## 2024-06-09 PROCEDURE — 25010000002 DEXAMETHASONE PER 1 MG: Performed by: NEUROLOGICAL SURGERY

## 2024-06-09 PROCEDURE — 25010000002 MORPHINE PER 10 MG: Performed by: HOSPITALIST

## 2024-06-09 RX ORDER — INSULIN LISPRO 100 [IU]/ML
4-24 INJECTION, SOLUTION INTRAVENOUS; SUBCUTANEOUS EVERY 6 HOURS SCHEDULED
Status: DISCONTINUED | OUTPATIENT
Start: 2024-06-09 | End: 2024-06-11

## 2024-06-09 RX ADMIN — INSULIN LISPRO 4 UNITS: 100 INJECTION, SOLUTION INTRAVENOUS; SUBCUTANEOUS at 14:14

## 2024-06-09 RX ADMIN — INSULIN GLARGINE 40 UNITS: 100 INJECTION, SOLUTION SUBCUTANEOUS at 20:54

## 2024-06-09 RX ADMIN — MORPHINE SULFATE 2 MG: 2 INJECTION, SOLUTION INTRAMUSCULAR; INTRAVENOUS at 06:07

## 2024-06-09 RX ADMIN — BUDESONIDE 0.5 MG: 0.5 INHALANT RESPIRATORY (INHALATION) at 20:10

## 2024-06-09 RX ADMIN — IPRATROPIUM BROMIDE AND ALBUTEROL SULFATE 3 ML: .5; 3 SOLUTION RESPIRATORY (INHALATION) at 15:50

## 2024-06-09 RX ADMIN — DEXAMETHASONE SODIUM PHOSPHATE 4 MG: 4 INJECTION, SOLUTION INTRAMUSCULAR; INTRAVENOUS at 00:33

## 2024-06-09 RX ADMIN — IPRATROPIUM BROMIDE AND ALBUTEROL SULFATE 3 ML: .5; 3 SOLUTION RESPIRATORY (INHALATION) at 10:51

## 2024-06-09 RX ADMIN — DEXAMETHASONE SODIUM PHOSPHATE 4 MG: 4 INJECTION, SOLUTION INTRAMUSCULAR; INTRAVENOUS at 05:47

## 2024-06-09 RX ADMIN — BUDESONIDE 0.5 MG: 0.5 INHALANT RESPIRATORY (INHALATION) at 07:19

## 2024-06-09 RX ADMIN — MORPHINE SULFATE 2 MG: 2 INJECTION, SOLUTION INTRAMUSCULAR; INTRAVENOUS at 21:12

## 2024-06-09 RX ADMIN — INSULIN LISPRO 3 UNITS: 100 INJECTION, SOLUTION INTRAVENOUS; SUBCUTANEOUS at 08:52

## 2024-06-09 RX ADMIN — DEXAMETHASONE SODIUM PHOSPHATE 4 MG: 4 INJECTION, SOLUTION INTRAMUSCULAR; INTRAVENOUS at 20:54

## 2024-06-09 RX ADMIN — DEXAMETHASONE SODIUM PHOSPHATE 4 MG: 4 INJECTION, SOLUTION INTRAMUSCULAR; INTRAVENOUS at 14:22

## 2024-06-09 RX ADMIN — Medication 10 ML: at 21:09

## 2024-06-09 RX ADMIN — MORPHINE SULFATE 2 MG: 2 INJECTION, SOLUTION INTRAMUSCULAR; INTRAVENOUS at 15:00

## 2024-06-09 RX ADMIN — SODIUM CHLORIDE 100 ML/HR: 900 INJECTION, SOLUTION INTRAVENOUS at 21:06

## 2024-06-09 RX ADMIN — Medication 10 ML: at 09:15

## 2024-06-09 RX ADMIN — IPRATROPIUM BROMIDE AND ALBUTEROL SULFATE 3 ML: .5; 3 SOLUTION RESPIRATORY (INHALATION) at 07:25

## 2024-06-09 RX ADMIN — INSULIN LISPRO 4 UNITS: 100 INJECTION, SOLUTION INTRAVENOUS; SUBCUTANEOUS at 18:20

## 2024-06-09 RX ADMIN — IPRATROPIUM BROMIDE AND ALBUTEROL SULFATE 3 ML: .5; 3 SOLUTION RESPIRATORY (INHALATION) at 20:15

## 2024-06-09 RX ADMIN — INSULIN LISPRO 3 UNITS: 100 INJECTION, SOLUTION INTRAVENOUS; SUBCUTANEOUS at 00:33

## 2024-06-09 NOTE — PROGRESS NOTES
NEUROSURGERY PROGRESS NOTE     LOS: 1 day   Patient Care Team:  Marissa Prasad MD as PCP - General (Family Medicine)  Akin Brown MD as Consulting Physician (Cardiology)    Chief Complaint:  No chief complaint on file.  Neck pain, swallowing difficulties    Subjective     Interval History: NAEO.  Pain slightly improved.  Still unable to swallow.  Cleared by speech for sponges and ice chips.    While in the room and during my examination of the patient I wore a mask and eye protection.  I washed my hands before and after this patient encounter.  The patient was also wearing a mask.     History taken from: patient    Objective      Vital Signs  Temp:  [97.4 °F (36.3 °C)-97.9 °F (36.6 °C)] 97.4 °F (36.3 °C)  Heart Rate:  [64-88] 76  Resp:  [16-24] 16  BP: (118-154)/(68-74) 118/68  Body mass index is 26.09 kg/m².    Intake/Output last 3 shifts:  I/O last 3 completed shifts:  In: 1200 [I.V.:1200]  Out: 3425 [Urine:3425]    Intake/Output this shift:  No intake/output data recorded.    Physical    GENERAL: No acute distress. Appears well nourished. Appears stated age.  HEENT: Atraumatic and normocephalic  CARDIO: RRR on monitoring. Pulses 2+  PULM: breathing nonlabored on room air  NEURO: AAOx3. EOMI. PERRL. CNi. Strength 5/5 in all extremities. Sensation intact to light touch. Reflexes 2+ throughout.  WOUND: Incision CDI    Results Review:  I reviewed the patient's new clinical results.    Labs:    Lab Results (last 24 hours)       Procedure Component Value Units Date/Time    POC Glucose Q6H [[id number]]  (Abnormal) Collected: 06/08/24 1802    Specimen: Blood Updated: 06/08/24 1803     Glucose 177 mg/dL      Comment: Serial Number: [id number]:  [id number]       POC Glucose Q6H [[id number]]  (Abnormal) Collected: 06/09/24 0028    Specimen: Blood Updated: 06/09/24 0030     Glucose 192 mg/dL      Comment: Serial Number: [id number]:  [id number]       POC Glucose Once [[id number]]  (Abnormal) Collected:  "06/09/24 0552    Specimen: Blood Updated: 06/09/24 0554     Glucose 198 mg/dL      Comment: Serial Number: [id number]:  [id number]       POC Glucose Q6H [[id number]]  (Abnormal) Collected: 06/09/24 1221    Specimen: Blood Updated: 06/09/24 1224     Glucose 175 mg/dL      Comment: Serial Number: [id number]:  [id number]               Imaging:    No new neuroimaging.    Current Medications:   Scheduled Meds:amLODIPine, 10 mg, Oral, Q24H  atorvastatin, 80 mg, Oral, Daily  budesonide, 0.5 mg, Nebulization, BID - RT  dexAMETHasone, 4 mg, Intravenous, Q6H  insulin glargine, 40 Units, Subcutaneous, Nightly  insulin lispro, 4-24 Units, Subcutaneous, Q6H  ipratropium-albuterol, 3 mL, Nebulization, 4x Daily - RT  metoprolol succinate XL, 25 mg, Oral, Daily  pantoprazole, 40 mg, Oral, Daily  sodium chloride, 10 mL, Intravenous, Q12H      Continuous Infusions:Pharmacy Consult - Steroid Insulin Protocol,   sodium chloride, 100 mL/hr, Last Rate: 100 mL/hr (06/08/24 0958)        Assessment & Plan       Cervical myelopathy    S/P cervical spinal fusion      Assessment/Plan:  Gurinder Amezcua is a 74 y.o. male with C3-4, C4-5 ACDF with postoperative swallowing difficulties    Neuro: Continue routine neuro checks.  Continue current pain and bowel regimen.  Continue working with speech therapy.  Barium swallow pending.  Wound/Incision: Continue dressing.  Cardio: Goal Normotensive  Pulm: Room air.  Activity: No restrictions to activity., Encourage ambulation., Work with PT/OT.  Diet: Advance diet as tolerated.  DVT Prophylaxis: Prophylactic Lovenox  GI Prophylaxis: Not indicated due to low risk.  Dispo:  Continue floor cares      Tee Farrell MD  06/09/24  13:17 EDT    \"Dictated utilizing Dragon dictation\".      "

## 2024-06-09 NOTE — PLAN OF CARE
Goal Outcome Evaluation:               Pt has swallow study done today pt is to have ice chips and sips of water. Blood sugars treated via the MAR . Dressing change today , surgical site looks clean dry and intact. Pt ambulated around the unit Care plan ongoing

## 2024-06-09 NOTE — THERAPY RE-EVALUATION
Acute Care - Speech Language Pathology   Swallow Re-Evaluation CHRISTINA Fitzgerald     Patient Name: Gurinder Amezcua  : 1950  MRN: [id number]  Today's Date: 2024               Admit Date: 2024    Visit Dx:     ICD-10-CM ICD-9-CM   1. Cervical myelopathy  G95.9 721.1     Patient Active Problem List   Diagnosis    Type 2 diabetes mellitus with diabetic polyneuropathy, with long-term current use of insulin    Dyslipidemia    Acute deep vein thrombosis (DVT) of femoral vein of right lower extremity    Arthritis    Esophageal obstruction    Other esophagitis without bleeding    Cerebral infarction, unspecified    Chronic back pain    Chronic fatigue    Degenerative disc disease, lumbar    Diaphragmatic hernia without obstruction or gangrene    Dysphagia    Encounter for screening colonoscopy    Lumbar disc disease with radiculopathy    Lower leg DVT (deep venous thromboembolism), chronic, right    History of iron deficiency    History of DVT (deep vein thrombosis)    Herniated nucleus pulposus of lumbosacral region    Hearing loss    Gastro-esophageal reflux disease without esophagitis    Gastroesophageal reflux disease without esophagitis    Gastric foreign body    Ureteral stone with hydronephrosis    Trigeminal neuralgia    Recurrent kidney stones    Nephrolithiasis    Polyp of stomach and duodenum    Mixed hyperlipidemia    Major depressive disorder, single episode, unspecified    Lumbosacral spondylosis without myelopathy    Maxillary pain    Vitamin D deficiency    Type 2 diabetes mellitus with hyperglycemia    Coronary artery disease of native artery of native heart with stable angina pectoris    Abnormal nuclear stress test    Cervical myelopathy    S/P cervical spinal fusion     Past Medical History:   Diagnosis Date    Ankylosing spondylitis of site in spine     Spine surgery    Anxiety     Carotid artery occlusion     Cataracts, bilateral 2022    Cervical disc disorder     Chronic pain disorder      Coronary artery disease of native artery of native heart with stable angina pectoris 12/28/2023    Deep vein thrombosis 2018 approx    none since    Depression 6/19    Death of wife    Diabetes mellitus 2017 approx    Type 2    Headache Always    Heart attack 06/17/2021    Low back pain     Lumbosacral disc disease     Migraine Unknown    Mixed hyperlipidemia 03/08/2018    Neck pain Feb 2023    Peripheral neuropathy     Rheumatoid arthritis Unknown    Shingles     Spinal stenosis     Thoracic disc disorder     Trigeminal neuralgia     Ureteral stone with hydronephrosis 11/24/2016     Past Surgical History:   Procedure Laterality Date    ANTERIOR CERVICAL DISCECTOMY W/ FUSION N/A 6/6/2024    Procedure: Cervical 3-5 anterior cervical discectomy and fusion;  Surgeon: Bang Mirza IV, MD;  Location: [facility] MAIN OR;  Service: Neurosurgery;  Laterality: N/A;    BACK SURGERY  2019    4 rods in back    BRAIN SURGERY      CARDIAC CATHETERIZATION  06/17/2021    CARDIAC CATHETERIZATION N/A 01/02/2024    Procedure: Left Heart Cath with angiogram;  Surgeon: Akin Brown MD;  Location: [facility] CATH INVASIVE LOCATION;  Service: Cardiovascular;  Laterality: N/A;    CARDIAC CATHETERIZATION N/A 01/02/2024    Procedure: Left ventriculography;  Surgeon: Akin Brown MD;  Location: [facility] CATH INVASIVE LOCATION;  Service: Cardiovascular;  Laterality: N/A;    CAROTID STENT      CORONARY STENT PLACEMENT  06/17/2021    CRANIOTOMY      CYBERKNIFE      ENDOSCOPY      EPIDURAL BLOCK      HERNIA REPAIR      KNEE ARTHROSCOPY      LAMINECTOMY      NECK SURGERY  Unknown    SPINAL CORD STIMULATOR IMPLANT  Previously    SPINAL FUSION  Unknown    SPINE SURGERY      TRIGEMINAL NERVE DECOMPRESSION      TRIGGER POINT INJECTION         SLP Recommendation and Plan               EDUCATION  The patient has been educated in the following areas:   Dysphagia (Swallowing Impairment) Oral Care/Hydration NPO rationale.        SLP GOALS       Row Name 06/09/24  1600       (LTG) Swallow    (LTG) Swallow Patient will tolerate safest and least restrictive diet without complications from aspiration.  -CB    [place] (Swallow Long Term Goal) independently (over 90% accuracy)  -CB    Time Frame (Swallow Long Term Goal) by discharge  -CB    Progress/Outcomes (Swallow Long Term Goal) continuing progress toward goal  -CB       (STG) Swallow 1    (STG) Swallow 1 Patient will participate in ongoing reevaluation clinically and including instrumental assessment if indicated in anticipation of initiation of  PO diet.  -CB    [place] (Swallow Short Term Goal 1) independently (over 90% accuracy)  -CB    Time Frame (Swallow Short Term Goal 1) by discharge  -CB    Progress/Outcomes (Swallow Short Term Goal 1) continuing progress toward goal  -CB    Comment (Swallow Short Term Goal 1) Patient was seen for reevaluation of swallow function. Patient expressed his frustration of care and frustrated that his stay was longer than expected. Patient was sitting on side of bed during assessment. Provided trials of ice chips x 2. Patient noted to clear throat x 2. Provided trials of thins via spoon x 3. Patient noted to exhibit intermittent clearing of throat and/or cough x 2. Vocal quality was clear. Provided trials of puree x 3. Patient sensated that majority of material cleared  his throat but noted some residue that required mulitple swallows  to improve further clearing. Suspect patient still has some swelling but much improved since initial evaluation. Patient was c/o nursing was not allowing him to have ice chips and water as initially instructed.  Reassured him that instructions for [place] Water Protocol will be reiterated to nursing so he can have ice chips and water when requested. Patient is not appropriate for PO at this time and should remain NPO. Further evaluation of swallow with VFSS is recommended prior to PO in the morning.  -CB              User Key  (r) = Recorded By, (t)  = Taken By, (c) = Cosigned By      Initials Name Provider Type    Jovanna Mckinley, SLP Speech and Language Pathologist                               Time Calculation:                RONEY Donis  6/9/2024

## 2024-06-09 NOTE — PROGRESS NOTES
[facility] MEDICINE SERVICE  DAILY PROGRESS NOTE    NAME: Gurinder Amezcua  : 1950  MRN: [id number]      LOS: 1 day     PROVIDER OF SERVICE: Timothy Duane Brammell, MD    Chief Complaint: Cervical myelopathy    Subjective:     Interval History:  History taken from:   Patient Complaints:   Patient Denies: Patient patiently awaiting return of his swallowing.  He feels his pain is improved with his morphine.  He denies any other additional acute issues.  Blood sugars somewhat improved.  Getting his IV steroids.    Review of Systems:   Review of Systems    Objective:     Vital Signs  Temp:  [97.4 °F (36.3 °C)-97.9 °F (36.6 °C)] 97.4 °F (36.3 °C)  Heart Rate:  [64-88] 76  Resp:  [16-24] 16  BP: (118-154)/(68-74) 118/68   Body mass index is 26.09 kg/m².    Physical Exam  Physical Exam    Scheduled Meds   amLODIPine, 10 mg, Oral, Q24H  atorvastatin, 80 mg, Oral, Daily  budesonide, 0.5 mg, Nebulization, BID - RT  dexAMETHasone, 4 mg, Intravenous, Q6H  insulin glargine, 40 Units, Subcutaneous, Nightly  insulin lispro, 4-24 Units, Subcutaneous, Q6H  ipratropium-albuterol, 3 mL, Nebulization, 4x Daily - RT  metoprolol succinate XL, 25 mg, Oral, Daily  pantoprazole, 40 mg, Oral, Daily  sodium chloride, 10 mL, Intravenous, Q12H       PRN Meds     acetaminophen **OR** acetaminophen **OR** acetaminophen    senna-docusate sodium **AND** polyethylene glycol **AND** bisacodyl **AND** bisacodyl    cyclobenzaprine    dextrose    dextrose    glucagon (human recombinant)    hydrALAZINE    HYDROcodone-acetaminophen    melatonin    Morphine **OR** Morphine    [DISCONTINUED] HYDROmorphone **AND** naloxone    ondansetron ODT **OR** ondansetron    Pharmacy Consult - Steroid Insulin Protocol    sodium chloride    sodium chloride   Infusions  Pharmacy Consult - Steroid Insulin Protocol,   sodium chloride, 100 mL/hr, Last Rate: 100 mL/hr (24 0958)          Diagnostic Data    Results from last 7 days   Lab Units  06/07/24  0006   WBC 10*3/mm3 6.27   HEMOGLOBIN g/dL 13.5   HEMATOCRIT % 40.6   PLATELETS 10*3/mm3 134*   GLUCOSE mg/dL 282*   CREATININE mg/dL 0.83   BUN mg/dL 16   SODIUM mmol/L 135*   POTASSIUM mmol/L 4.6   ANION GAP mmol/L 11.5       No radiology results for the last day          Assessment/:    Postsurgical from cervical spinal procedure.  Ongoing dysphagia with use of IV steroids for edema.  Elevated blood sugars with steroid therapy  Chronic trigeminal neuralgia  Pain control  Dysphagia postop     Plan: Follow-up blood sugars.  Will need alteration of his insulin once a steroid therapy is complete.  Swallowing per neurosurgery.  Active and Resolved Problems  Active Hospital Problems    Diagnosis  POA    **Cervical myelopathy [G95.9]  Unknown    S/P cervical spinal fusion [Z98.1]  Not Applicable      Resolved Hospital Problems   No resolved problems to display.           DVT prophylaxis:  Mechanical DVT prophylaxis orders are present.         Code status is   Code Status and Medical Interventions:   Ordered at: 06/07/24 1227     Level Of Support Discussed With:    Patient     Code Status (Patient has no pulse and is not breathing):    CPR (Attempt to Resuscitate)     Medical Interventions (Patient has pulse or is breathing):    Full Support       Plan for disposition:home in 2 days    Time: 30 minutes    Signature: Electronically signed by Timothy Duane Brammell, MD, 06/09/24, 13:15 EDT.  [facility] Hospitalist Team

## 2024-06-09 NOTE — PLAN OF CARE
Goal Outcome Evaluation:               Pt in bed appears to be resting, no c/o pain at this time. Border gauze in place. Remains NPO. Call light in reach, Plan continues

## 2024-06-09 NOTE — PLAN OF CARE
Goal Outcome Evaluation:      Patient was seen for reevaluation of swallow function. Patient expressed his frustration of care and frustrated that his stay was longer than expected. Patient was sitting on side of bed during assessment. Provided trials of ice chips x 2. Patient noted to clear throat x 2. Provided trials of thins via spoon x 3. Patient noted to exhibit intermittent clearing of throat and/or cough x 2. Vocal quality was clear. Provided trials of puree x 3. Patient sensated that majority of material cleared  his throat but noted some residue that required mulitple swallows  to improve further clearing. Suspect patient still has some swelling but much improved since initial evaluation. Patient was c/o nursing was not allowing him to have ice chips and water as initially instructed.  Reassured him that instructions for [employer] Water Protocol will be reiterated to nursing so he can have ice chips and water when requested. Patient is not appropriate for PO at this time and should remain NPO. Further evaluation of swallow with VFSS is recommended prior to PO in the morning.    The rationale to recommend water when a PO diet cannot appropriately/functionally sustain nutrition (or if thickened liquids are prescribed due to aspiration of thin liquids) is because water is low risk for aspiration pna when compared to aspiration of food or other liquids.    Benefits of a water protocol include but are not limited to:     Oral gratification   Engagement of oropharyngeal swallow musculature   Decrease likelihood of dehydration      Guidelines for proper implementation include:    Thorough oral care prior to consuming water  Upright at 90 degree hip flexion  Small sips at slow rate  Monitor for any changes in respiratory status and discontinue if distress noted    The [place] [employer] Water Protocol is a research based protocol established in 1984.

## 2024-06-10 ENCOUNTER — INPATIENT HOSPITAL (OUTPATIENT)
Dept: URBAN - METROPOLITAN AREA HOSPITAL 84 | Facility: HOSPITAL | Age: 74
End: 2024-06-10

## 2024-06-10 ENCOUNTER — APPOINTMENT (OUTPATIENT)
Dept: GENERAL RADIOLOGY | Facility: HOSPITAL | Age: 74
End: 2024-06-10
Payer: MEDICARE

## 2024-06-10 DIAGNOSIS — K62.5 HEMORRHAGE OF ANUS AND RECTUM: ICD-10-CM

## 2024-06-10 PROBLEM — R63.30 FEEDING DIFFICULTIES: Status: ACTIVE | Noted: 2024-05-24

## 2024-06-10 LAB
ALBUMIN SERPL-MCNC: 3.8 G/DL (ref 3.5–5.2)
ALBUMIN/GLOB SERPL: 1.5 G/DL
ALP SERPL-CCNC: 80 U/L (ref 39–117)
ALT SERPL W P-5'-P-CCNC: 17 U/L (ref 1–41)
ANION GAP SERPL CALCULATED.3IONS-SCNC: 7.3 MMOL/L (ref 5–15)
ANION GAP SERPL CALCULATED.3IONS-SCNC: 8.6 MMOL/L (ref 5–15)
APTT PPP: 25.4 SECONDS (ref 24–31)
AST SERPL-CCNC: 15 U/L (ref 1–40)
BASOPHILS # BLD AUTO: 0.01 10*3/MM3 (ref 0–0.2)
BASOPHILS # BLD AUTO: 0.01 10*3/MM3 (ref 0–0.2)
BASOPHILS NFR BLD AUTO: 0.2 % (ref 0–1.5)
BASOPHILS NFR BLD AUTO: 0.2 % (ref 0–1.5)
BILIRUB SERPL-MCNC: 0.8 MG/DL (ref 0–1.2)
BUN SERPL-MCNC: 23 MG/DL (ref 8–23)
BUN SERPL-MCNC: 23 MG/DL (ref 8–23)
BUN/CREAT SERPL: 31.5 (ref 7–25)
BUN/CREAT SERPL: 33.3 (ref 7–25)
CALCIUM SPEC-SCNC: 8.7 MG/DL (ref 8.6–10.5)
CALCIUM SPEC-SCNC: 8.8 MG/DL (ref 8.6–10.5)
CHLORIDE SERPL-SCNC: 106 MMOL/L (ref 98–107)
CHLORIDE SERPL-SCNC: 107 MMOL/L (ref 98–107)
CO2 SERPL-SCNC: 22.4 MMOL/L (ref 22–29)
CO2 SERPL-SCNC: 24.7 MMOL/L (ref 22–29)
CREAT SERPL-MCNC: 0.69 MG/DL (ref 0.76–1.27)
CREAT SERPL-MCNC: 0.73 MG/DL (ref 0.76–1.27)
DEPRECATED RDW RBC AUTO: 44.4 FL (ref 37–54)
DEPRECATED RDW RBC AUTO: 44.5 FL (ref 37–54)
EGFRCR SERPLBLD CKD-EPI 2021: 96.1 ML/MIN/1.73
EGFRCR SERPLBLD CKD-EPI 2021: 97.7 ML/MIN/1.73
EOSINOPHIL # BLD AUTO: 0 10*3/MM3 (ref 0–0.4)
EOSINOPHIL # BLD AUTO: 0 10*3/MM3 (ref 0–0.4)
EOSINOPHIL NFR BLD AUTO: 0 % (ref 0.3–6.2)
EOSINOPHIL NFR BLD AUTO: 0 % (ref 0.3–6.2)
ERYTHROCYTE [DISTWIDTH] IN BLOOD BY AUTOMATED COUNT: 13.3 % (ref 12.3–15.4)
ERYTHROCYTE [DISTWIDTH] IN BLOOD BY AUTOMATED COUNT: 13.3 % (ref 12.3–15.4)
GLOBULIN UR ELPH-MCNC: 2.5 GM/DL
GLUCOSE BLDC GLUCOMTR-MCNC: 107 MG/DL (ref 70–105)
GLUCOSE BLDC GLUCOMTR-MCNC: 145 MG/DL (ref 70–105)
GLUCOSE BLDC GLUCOMTR-MCNC: 146 MG/DL (ref 70–105)
GLUCOSE BLDC GLUCOMTR-MCNC: 185 MG/DL (ref 70–105)
GLUCOSE BLDC GLUCOMTR-MCNC: 89 MG/DL (ref 70–105)
GLUCOSE SERPL-MCNC: 184 MG/DL (ref 65–99)
GLUCOSE SERPL-MCNC: 96 MG/DL (ref 65–99)
HCT VFR BLD AUTO: 41.7 % (ref 37.5–51)
HCT VFR BLD AUTO: 43.6 % (ref 37.5–51)
HGB BLD-MCNC: 13.9 G/DL (ref 13–17.7)
HGB BLD-MCNC: 14.3 G/DL (ref 13–17.7)
IMM GRANULOCYTES # BLD AUTO: 0.02 10*3/MM3 (ref 0–0.05)
IMM GRANULOCYTES # BLD AUTO: 0.02 10*3/MM3 (ref 0–0.05)
IMM GRANULOCYTES NFR BLD AUTO: 0.3 % (ref 0–0.5)
IMM GRANULOCYTES NFR BLD AUTO: 0.3 % (ref 0–0.5)
INR PPP: 1.1 (ref 0.93–1.1)
LYMPHOCYTES # BLD AUTO: 0.77 10*3/MM3 (ref 0.7–3.1)
LYMPHOCYTES # BLD AUTO: 1.4 10*3/MM3 (ref 0.7–3.1)
LYMPHOCYTES NFR BLD AUTO: 12.3 % (ref 19.6–45.3)
LYMPHOCYTES NFR BLD AUTO: 22.2 % (ref 19.6–45.3)
MCH RBC QN AUTO: 29.5 PG (ref 26.6–33)
MCH RBC QN AUTO: 30.1 PG (ref 26.6–33)
MCHC RBC AUTO-ENTMCNC: 32.8 G/DL (ref 31.5–35.7)
MCHC RBC AUTO-ENTMCNC: 33.3 G/DL (ref 31.5–35.7)
MCV RBC AUTO: 90.1 FL (ref 79–97)
MCV RBC AUTO: 90.3 FL (ref 79–97)
MONOCYTES # BLD AUTO: 0.37 10*3/MM3 (ref 0.1–0.9)
MONOCYTES # BLD AUTO: 0.63 10*3/MM3 (ref 0.1–0.9)
MONOCYTES NFR BLD AUTO: 10 % (ref 5–12)
MONOCYTES NFR BLD AUTO: 5.9 % (ref 5–12)
NEUTROPHILS NFR BLD AUTO: 4.26 10*3/MM3 (ref 1.7–7)
NEUTROPHILS NFR BLD AUTO: 5.11 10*3/MM3 (ref 1.7–7)
NEUTROPHILS NFR BLD AUTO: 67.3 % (ref 42.7–76)
NEUTROPHILS NFR BLD AUTO: 81.3 % (ref 42.7–76)
NRBC BLD AUTO-RTO: 0 /100 WBC (ref 0–0.2)
NRBC BLD AUTO-RTO: 0 /100 WBC (ref 0–0.2)
PLATELET # BLD AUTO: 111 10*3/MM3 (ref 140–450)
PLATELET # BLD AUTO: 124 10*3/MM3 (ref 140–450)
PMV BLD AUTO: 10.3 FL (ref 6–12)
PMV BLD AUTO: 10.3 FL (ref 6–12)
POTASSIUM SERPL-SCNC: 3.7 MMOL/L (ref 3.5–5.2)
POTASSIUM SERPL-SCNC: 4.1 MMOL/L (ref 3.5–5.2)
PROT SERPL-MCNC: 6.3 G/DL (ref 6–8.5)
PROTHROMBIN TIME: 11.9 SECONDS (ref 9.6–11.7)
RBC # BLD AUTO: 4.62 10*6/MM3 (ref 4.14–5.8)
RBC # BLD AUTO: 4.84 10*6/MM3 (ref 4.14–5.8)
SODIUM SERPL-SCNC: 137 MMOL/L (ref 136–145)
SODIUM SERPL-SCNC: 139 MMOL/L (ref 136–145)
WBC NRBC COR # BLD AUTO: 6.28 10*3/MM3 (ref 3.4–10.8)
WBC NRBC COR # BLD AUTO: 6.32 10*3/MM3 (ref 3.4–10.8)

## 2024-06-10 PROCEDURE — 74230 X-RAY XM SWLNG FUNCJ C+: CPT

## 2024-06-10 PROCEDURE — 94761 N-INVAS EAR/PLS OXIMETRY MLT: CPT

## 2024-06-10 PROCEDURE — 92611 MOTION FLUOROSCOPY/SWALLOW: CPT

## 2024-06-10 PROCEDURE — 25010000002 DEXAMETHASONE PER 1 MG: Performed by: NEUROLOGICAL SURGERY

## 2024-06-10 PROCEDURE — 99024 POSTOP FOLLOW-UP VISIT: CPT

## 2024-06-10 PROCEDURE — 25010000002 MORPHINE PER 10 MG: Performed by: HOSPITALIST

## 2024-06-10 PROCEDURE — 85025 COMPLETE CBC W/AUTO DIFF WBC: CPT | Performed by: HOSPITALIST

## 2024-06-10 PROCEDURE — 80053 COMPREHEN METABOLIC PANEL: CPT | Performed by: HOSPITALIST

## 2024-06-10 PROCEDURE — 86850 RBC ANTIBODY SCREEN: CPT | Performed by: NEUROLOGICAL SURGERY

## 2024-06-10 PROCEDURE — 99232 SBSQ HOSP IP/OBS MODERATE 35: CPT | Mod: FS | Performed by: NURSE PRACTITIONER

## 2024-06-10 PROCEDURE — 94799 UNLISTED PULMONARY SVC/PX: CPT

## 2024-06-10 PROCEDURE — 85730 THROMBOPLASTIN TIME PARTIAL: CPT | Performed by: NEUROLOGICAL SURGERY

## 2024-06-10 PROCEDURE — 86900 BLOOD TYPING SEROLOGIC ABO: CPT | Performed by: NEUROLOGICAL SURGERY

## 2024-06-10 PROCEDURE — 85025 COMPLETE CBC W/AUTO DIFF WBC: CPT | Performed by: NURSE PRACTITIONER

## 2024-06-10 PROCEDURE — 82948 REAGENT STRIP/BLOOD GLUCOSE: CPT | Performed by: HOSPITALIST

## 2024-06-10 PROCEDURE — 86901 BLOOD TYPING SEROLOGIC RH(D): CPT | Performed by: NEUROLOGICAL SURGERY

## 2024-06-10 PROCEDURE — 94664 DEMO&/EVAL PT USE INHALER: CPT

## 2024-06-10 PROCEDURE — 85610 PROTHROMBIN TIME: CPT | Performed by: NURSE PRACTITIONER

## 2024-06-10 PROCEDURE — 63710000001 INSULIN LISPRO (HUMAN) PER 5 UNITS: Performed by: HOSPITALIST

## 2024-06-10 PROCEDURE — 82948 REAGENT STRIP/BLOOD GLUCOSE: CPT

## 2024-06-10 PROCEDURE — 25810000003 SODIUM CHLORIDE 0.9 % SOLUTION: Performed by: NEUROLOGICAL SURGERY

## 2024-06-10 RX ORDER — DEXAMETHASONE SODIUM PHOSPHATE 4 MG/ML
4 INJECTION, SOLUTION INTRA-ARTICULAR; INTRALESIONAL; INTRAMUSCULAR; INTRAVENOUS; SOFT TISSUE EVERY 12 HOURS
Status: DISCONTINUED | OUTPATIENT
Start: 2024-06-10 | End: 2024-06-12

## 2024-06-10 RX ORDER — PANTOPRAZOLE SODIUM 40 MG/10ML
40 INJECTION, POWDER, LYOPHILIZED, FOR SOLUTION INTRAVENOUS
Status: DISCONTINUED | OUTPATIENT
Start: 2024-06-10 | End: 2024-06-12 | Stop reason: HOSPADM

## 2024-06-10 RX ADMIN — SODIUM CHLORIDE 100 ML/HR: 900 INJECTION, SOLUTION INTRAVENOUS at 05:32

## 2024-06-10 RX ADMIN — DEXAMETHASONE SODIUM PHOSPHATE 4 MG: 4 INJECTION, SOLUTION INTRAMUSCULAR; INTRAVENOUS at 09:00

## 2024-06-10 RX ADMIN — BUDESONIDE 0.5 MG: 0.5 INHALANT RESPIRATORY (INHALATION) at 07:13

## 2024-06-10 RX ADMIN — Medication 10 ML: at 23:54

## 2024-06-10 RX ADMIN — MORPHINE SULFATE 2 MG: 2 INJECTION, SOLUTION INTRAMUSCULAR; INTRAVENOUS at 23:26

## 2024-06-10 RX ADMIN — BARIUM SULFATE 50 ML: 400 SUSPENSION ORAL at 10:51

## 2024-06-10 RX ADMIN — IPRATROPIUM BROMIDE AND ALBUTEROL SULFATE 3 ML: .5; 3 SOLUTION RESPIRATORY (INHALATION) at 14:45

## 2024-06-10 RX ADMIN — MORPHINE SULFATE 2 MG: 2 INJECTION, SOLUTION INTRAMUSCULAR; INTRAVENOUS at 09:19

## 2024-06-10 RX ADMIN — INSULIN LISPRO 4 UNITS: 100 INJECTION, SOLUTION INTRAVENOUS; SUBCUTANEOUS at 00:15

## 2024-06-10 RX ADMIN — DEXAMETHASONE SODIUM PHOSPHATE 4 MG: 4 INJECTION, SOLUTION INTRAMUSCULAR; INTRAVENOUS at 23:02

## 2024-06-10 RX ADMIN — MORPHINE SULFATE 2 MG: 2 INJECTION, SOLUTION INTRAMUSCULAR; INTRAVENOUS at 16:12

## 2024-06-10 RX ADMIN — DEXAMETHASONE SODIUM PHOSPHATE 4 MG: 4 INJECTION, SOLUTION INTRAMUSCULAR; INTRAVENOUS at 03:51

## 2024-06-10 RX ADMIN — IPRATROPIUM BROMIDE AND ALBUTEROL SULFATE 3 ML: .5; 3 SOLUTION RESPIRATORY (INHALATION) at 07:13

## 2024-06-10 NOTE — PLAN OF CARE
Goal Outcome Evaluation:      Pt alert and oriented. Pt complaints of pain managed with IV medication. Pt pending barium swallow later today. Glucose checked Q6, treated with insulin. Pt receiving IV fluids. VS stable. Plan of care ongoing.

## 2024-06-10 NOTE — PLAN OF CARE
"Goal Outcome Evaluation:   VFSS procedure completed this date. The patient was seen bedside earlier today and states he is consuming ice chips/water sips and doing \"better\" with this however still states \"he has difficulty with water\". He reported he ate \"3 tsps of applesauce last night and got most of it down\". He continues to complain of ice, water, applesauce getting \"hung up in his throat\". He has no nutrition since last Wednesday (June 5, 2024) and is currently NPO with the exception of ice chips/water sips only as per the [facility] Water Protocol. A Video Swallow Study was completed this date. The patient was seated up at a 90 degree angle and viewed in the lateral projection. He was able to self feed all trials today consisting of thin liquid by spoon x 4 trials and puree (applesauce) x 2 trials. Labial seal on spoon is adequate. He clears the utensil well. No anterior labial spillage noted. Functional oral transit and bolus control noted with both thin and puree consistencies. Swallow initiation is timely. Upon initiation of the swallow he presents with weak/reduced base of tongue retraction, limited anterior hyoid excursion, decreased to absent epiglottic deflection, reduced laryngeal elevation and incomplete laryngeal vestibular closure. This results in vallecular and pyriform sinus residue, inability to clear and penetration with ultimate aspiration of all consistencies assessed. He is able to make multiple spontaneous dry swallows however this is ineffective in clearing the residue and results in continued penetration/aspiration (an 8 on the Rosenbeck Penetration-Aspiration Scale). He does report a continues globus sensation. He is able to throat clear/dry swallow and utilize a head turn to the right and left, however none of these techniques are effective at this time.(See image below).  A prominent cricopharyngeal muscle was noted with moderate obstruction to the bolus flow.     IMPRESSIONS: The patient " presents with a severe dysphagia as evidenced by the above mentioned difficulties    RECOMMENDATIONS:  ST unable to make safe/appropriate po diet recommendations at this time due to severity of dysphagia. It is recommended that he remain NPO with the exception of ice chips/water sips as per the [employer] Water Protocol for now. He will require alternative means of nutrition, extensive dysphagia rehab and repeat instrumental assessment of swallow in approximately 4-6 weeks or as indicated clinically. Lengthy discussion with the patient and nursing staff following the procedure. ST will continue to follow while in-house for swallow therapy, patient/caregiver education, and additional goals/recommendations as indicated.     The rationale to recommend water/ice chips when a PO diet cannot appropriately/functionally sustain nutrition is because water is low risk for aspiration Pneumonia when compared to aspiration of food or other liquids.      Benefits of a water protocol include but are not limited to:     Oral gratification   Engagement of oropharyngeal swallow musculature   Decrease likelihood of dehydration      Guidelines for proper implementation include:  Thorough oral care prior to consuming water  Upright at 90 degree hip flexion  Small sips at slow rate    Monitor for any changes in respiratory status and discontinue if distress noted    The [employer] Water Protocol is a research based protocol established in 1984.                   Anticipated Discharge Disposition (SLP): inpatient rehabilitation facility          SLP Swallowing Diagnosis: severe, pharyngeal dysphagia, functional oral phase (06/10/24 1100)

## 2024-06-10 NOTE — PROGRESS NOTES
[facility] MEDICINE SERVICE  DAILY PROGRESS NOTE    NAME: Gurinder Amezcua  : 1950  MRN: [id number]      LOS: 2 days     PROVIDER OF SERVICE: Alfredo Jiménez MD    Chief Complaint: Cervical myelopathy    Subjective:     Interval History:  History taken from: patient chart RN  Dysphagia no significant improvement since yesterday   Awaiting barium swallow eval     Review of Systems:   Review of Systems  All negative except as above  Objective:     Vital Signs  Temp:  [97.3 °F (36.3 °C)-97.9 °F (36.6 °C)] 97.9 °F (36.6 °C)  Heart Rate:  [64-82] 64  Resp:  [12-18] 12  BP: (114-156)/(70-76) 156/76   Body mass index is 26.09 kg/m².    Physical Exam  Physical Exam  AOx3 NAD  RRR S1-S2 audible  Lungs CTA  Abdomen soft nontender nondistended  Scheduled Meds   amLODIPine, 10 mg, Oral, Q24H  atorvastatin, 80 mg, Oral, Daily  Barium Sulfate, 1 teaspoon(s), Oral, Once in imaging  budesonide, 0.5 mg, Nebulization, BID - RT  dexAMETHasone, 4 mg, Intravenous, Q12H  [Held by provider] insulin glargine, 40 Units, Subcutaneous, Nightly  insulin lispro, 4-24 Units, Subcutaneous, Q6H  ipratropium-albuterol, 3 mL, Nebulization, 4x Daily - RT  metoprolol succinate XL, 25 mg, Oral, Daily  pantoprazole, 40 mg, Oral, Daily  sodium chloride, 10 mL, Intravenous, Q12H       PRN Meds     acetaminophen **OR** acetaminophen **OR** acetaminophen    senna-docusate sodium **AND** polyethylene glycol **AND** bisacodyl **AND** bisacodyl    cyclobenzaprine    dextrose    dextrose    glucagon (human recombinant)    hydrALAZINE    HYDROcodone-acetaminophen    melatonin    Morphine **OR** Morphine    [DISCONTINUED] HYDROmorphone **AND** naloxone    ondansetron ODT **OR** ondansetron    Pharmacy Consult - Steroid Insulin Protocol    sodium chloride    sodium chloride   Infusions  Pharmacy Consult - Steroid Insulin Protocol,   sodium chloride, 100 mL/hr, Last Rate: 100 mL/hr (06/10/24 0532)          Diagnostic Data    Results from last 7  days   Lab Units 06/10/24  0021   WBC 10*3/mm3 6.28   HEMOGLOBIN g/dL 13.9   HEMATOCRIT % 41.7   PLATELETS 10*3/mm3 124*   GLUCOSE mg/dL 184*   CREATININE mg/dL 0.69*   BUN mg/dL 23   SODIUM mmol/L 137   POTASSIUM mmol/L 4.1   ANION GAP mmol/L 8.6       No radiology results for the last day      I reviewed the patient's new clinical results.  I reviewed the patient's new imaging results and agree with the interpretation.    Assessment/Plan:     Active and Resolved Problems  Active Hospital Problems    Diagnosis  POA    **Cervical myelopathy [G95.9]  Unknown    S/P cervical spinal fusion [Z98.1]  Not Applicable      Resolved Hospital Problems   No resolved problems to display.       #Cervical myelopathy status post C3-5 ACDF  Management per primary neurosurgery    #Dysphagia  Status postop  SLP following status post barium swallow study today to remain n.p.o.  Consult GI for feeding tube  Remains on IV dexamethasone dose decreased from every 6 to 12 per LANCE      #DM2  Blood glucose trend reviewed  Hold OHA  Hold Lantus  ISS lispro moderate correctional scale every 6 while NPO    #Hypertension  P.o. antihypertensives on hold as patient is strict n.p.o.  CTM    Will continue to follow along      DVT prophylaxis:  Mechanical DVT prophylaxis orders are present.         Code status is   Code Status and Medical Interventions:   Ordered at: 06/07/24 1227     Level Of Support Discussed With:    Patient     Code Status (Patient has no pulse and is not breathing):    CPR (Attempt to Resuscitate)     Medical Interventions (Patient has pulse or is breathing):    Full Support         Time: 30 minutes    Signature: Electronically signed by Alfredo Jiménez MD, 06/10/24, 13:17 EDT.  [facility] Hospitalist Team

## 2024-06-10 NOTE — SIGNIFICANT NOTE
06/10/24 1459   OTHER   Discipline physical therapist   Rehab Time/Intention   Session Not Performed patient/family declined treatment;other (see comments)  (Pt reports recently ambulating unit with staff. Pt also reporting frustration regarding his condition and wishes to stay in bed.)   Therapy Assessment/Plan (PT)   Criteria for Skilled Interventions Met (PT) yes;skilled treatment is necessary   Recommendation   PT - Next Appointment 06/11/24

## 2024-06-10 NOTE — PROGRESS NOTES
Neurosurgery Progress Note    Date: 06/10/24     Patient: Gurinder Amezcua   Sex: male   : 1950      SUBJECTIVE    CC: Post op day 4 cervical 3-5 anterior cervical discectomy and fusion     Interval history:  Patient is sitting up in bed eating ice chips. Patient continues to have dysphagia, but admits to some improvement. Patient denies any pain, numbness/tingling or weakness in his upper extremities. Patient continues to have headaches, but have been an issue prior to surgery.       Current Medications:  Scheduled Meds:amLODIPine, 10 mg, Oral, Q24H  atorvastatin, 80 mg, Oral, Daily  budesonide, 0.5 mg, Nebulization, BID - RT  dexAMETHasone, 4 mg, Intravenous, Q6H  insulin glargine, 40 Units, Subcutaneous, Nightly  insulin lispro, 4-24 Units, Subcutaneous, Q6H  ipratropium-albuterol, 3 mL, Nebulization, 4x Daily - RT  metoprolol succinate XL, 25 mg, Oral, Daily  pantoprazole, 40 mg, Oral, Daily  sodium chloride, 10 mL, Intravenous, Q12H      Continuous Infusions:Pharmacy Consult - Steroid Insulin Protocol,   sodium chloride, 100 mL/hr, Last Rate: 100 mL/hr (06/10/24 0532)      PRN Meds:   acetaminophen **OR** acetaminophen **OR** acetaminophen    senna-docusate sodium **AND** polyethylene glycol **AND** bisacodyl **AND** bisacodyl    cyclobenzaprine    dextrose    dextrose    glucagon (human recombinant)    hydrALAZINE    HYDROcodone-acetaminophen    melatonin    Morphine **OR** Morphine    [DISCONTINUED] HYDROmorphone **AND** naloxone    ondansetron ODT **OR** ondansetron    Pharmacy Consult - Steroid Insulin Protocol    sodium chloride    sodium chloride      OBJECTIVE  Vitals:    06/10/24 0714 06/10/24 0715 06/10/24 0716 06/10/24 0717   BP:       BP Location:       Patient Position:       Pulse: 71 71 74 74   Resp: 18  18 18   Temp:       TempSrc:       SpO2: 96% 96% 96% 96%   Weight:       Height:           Physical exam    General  - WD/WN male, appears their stated age  - Awake, cooperative, in no  acute distress  HEENT  - Normocephalic, atraumatic  - PERRLA, EOM intact  Respiratory  - Normal respiratory rate and effort  Musculoskeletal  - No joint redness, swelling, or tenderness  Skin  - Warm and dry, no bleeding, bruising, or rash. Surgical incision CDI with dressing.   NEUROLOGIC  - A/O x3, GCS 4-6-5  - CN II-XII grossly intact  - Moves all extremities symmetrically and w/ 5/5 strength  - Sensation intact throughout  - Negative pronator drift  - Normal finger-nose coordination      Results review  CBC          6/6/2024    08:37 6/6/2024    09:49 6/6/2024    15:57 6/7/2024    00:06 6/10/2024    00:21   CBC   WBC    6.27  6.28    RBC    4.54  4.62    Hemoglobin 11.9  11.9  15.0  13.5  13.9    Hematocrit 35  35  44  40.6  41.7    MCV    89.4  90.3    MCH    29.7  30.1    MCHC    33.3  33.3    RDW    13.2  13.3    Platelets    134  124        BMP          5/28/2024    06:40 6/7/2024    00:06 6/10/2024    00:21   BMP   BUN 19  16  23    Creatinine 0.94  0.83  0.69    Sodium 142  135  137    Potassium 3.7  4.6  4.1    Chloride 105  102  106    CO2 26.0  21.5  22.4    Calcium 9.2  9.1  8.7          XR Spine Cervical 2 or 3 View    Result Date: 6/6/2024  This procedure was auto-finalized with no dictation required.               ASSESSMENT/PLAN  This is a 74 y.o. male who underwent a C3-5 ACDF who is experiencing dysphagia post-op. Patient states that his dyaphagia has improved, and is better when he is laying on his right side. Patient states he is handling his secretions better this morning. Patient is scheduled to have a barium swallow test today. Patient is to remain NPO until speech clears him. Please reach out with any questions or concerns.     Plan:  Status post C3-5 ACDF  - Continue pain management per hospitalist   - Continue bowel regimen  - NPO  - Barium swallow test   - PT/OT    I discussed my assessment and recommendations with Dr. Yuki Wilkinson PA-C  06/10/24  08:07 EDT      Part of this  note may be an electronic transcription/translation of spoken language to printed text using the Dragon Dictation System.
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
## 2024-06-10 NOTE — CONSULTS
GI CONSULT  NOTE:    Referring Provider:  Dr. Jiménez    Chief complaint: Feeding difficulty, dysphagia    Subjective .     History of present illness: Gurinder Amezcua is a 74 y.o. male with history of DVT, depression, DMII, hyperlipidemia, hernia repair, and appendectomy who presented on 6/6 for ACDF.  Patient was admitted following ACDF.  He has been struggling with dysphagia since his surgery and did undergo video swallow evaluation earlier today.  During video swallow evaluation, it was determined by speech therapy that the patient is not safe for oral intake.  Therefore, GI was asked to evaluate for possible PEG tube placement.  The patient denies any dysphagia prior to surgery.  However, he reports significant difficulty to both solids and liquids at this time.  He has been having some intermittent nausea, but denies any vomiting.  No heartburn.  Denies any NSAID use.  He is not having any abdominal pain.  Reports regular bowel movements without bright red blood per rectum or melena.  However, he has not had a bowel movement since admission.  No recent fever.      Endo History:  5/2023 EGD (Dr. Almodovar) -fundic gland polyps, hiatal hernia, GE junction stricture s/p dilation to 50 Turkmen, LA grade C erosive esophagitis, gastric food    Past Medical History:  Past Medical History:   Diagnosis Date    Ankylosing spondylitis of site in spine 1998    Spine surgery    Anxiety     Carotid artery occlusion     Cataracts, bilateral 04/2022    Cervical disc disorder     Chronic pain disorder     Coronary artery disease of native artery of native heart with stable angina pectoris 12/28/2023    Deep vein thrombosis 2018 approx    none since    Depression 6/19    Death of wife    Diabetes mellitus 2017 approx    Type 2    Headache Always    Heart attack 06/17/2021    Low back pain     Lumbosacral disc disease     Migraine Unknown    Mixed hyperlipidemia 03/08/2018    Neck pain Feb 2023    Peripheral neuropathy      Rheumatoid arthritis Unknown    Shingles     Spinal stenosis     Thoracic disc disorder     Trigeminal neuralgia     Ureteral stone with hydronephrosis 2016       Past Surgical History:  Past Surgical History:   Procedure Laterality Date    ANTERIOR CERVICAL DISCECTOMY W/ FUSION N/A 2024    Procedure: Cervical 3-5 anterior cervical discectomy and fusion;  Surgeon: Bang Mirza IV, MD;  Location: [facility] MAIN OR;  Service: Neurosurgery;  Laterality: N/A;    BACK SURGERY      4 rods in back    BRAIN SURGERY      CARDIAC CATHETERIZATION  2021    CARDIAC CATHETERIZATION N/A 2024    Procedure: Left Heart Cath with angiogram;  Surgeon: Akin Brown MD;  Location: [facility] CATH INVASIVE LOCATION;  Service: Cardiovascular;  Laterality: N/A;    CARDIAC CATHETERIZATION N/A 2024    Procedure: Left ventriculography;  Surgeon: Akin Brown MD;  Location: [facility] CATH INVASIVE LOCATION;  Service: Cardiovascular;  Laterality: N/A;    CAROTID STENT      CORONARY STENT PLACEMENT  2021    CRANIOTOMY      CYBERKNIFE      ENDOSCOPY      EPIDURAL BLOCK      HERNIA REPAIR      KNEE ARTHROSCOPY      LAMINECTOMY      NECK SURGERY  Unknown    SPINAL CORD STIMULATOR IMPLANT  Previously    SPINAL FUSION  Unknown    SPINE SURGERY      TRIGEMINAL NERVE DECOMPRESSION      TRIGGER POINT INJECTION         Social History:  Social History     Tobacco Use    Smoking status: Never     Passive exposure: Never    Smokeless tobacco: Never   Vaping Use    Vaping status: Never Used   Substance Use Topics    Alcohol use: Never     Alcohol/week: 4.0 standard drinks of alcohol     Comment: or less per week    Drug use: Never       Family History:  Family History   Problem Relation Age of Onset    Diabetes Mother             Arthritis Mother     Heart attack Father             Aneurysm Father        Medications:  Medications Prior to Admission   Medication Sig Dispense Refill Last Dose    aspirin 81 MG EC  tablet Take 1 tablet by mouth Daily.   6/1/2024    atorvastatin (LIPITOR) 80 MG tablet Take 1 tablet by mouth Daily.       empagliflozin (Jardiance) 10 MG tablet tablet    6/4/2024    Lyumjerock VinsonPen 100 UNIT/ML solution pen-injector Inject 12 Units under the skin into the appropriate area as directed Every Morning.       metFORMIN ER (GLUCOPHAGE-XR) 500 MG 24 hr tablet Take 2 tablets by mouth Daily With Breakfast.   6/4/2024    metoprolol succinate XL (TOPROL-XL) 25 MG 24 hr tablet Take 1 tablet by mouth Daily. 90 tablet 3 6/6/2024 at 0330    pantoprazole (PROTONIX) 40 MG EC tablet Take 1 tablet by mouth Daily.   6/5/2024    Toman Salcidoar 300 UNIT/ML solution pen-injector injection Inject 30 Units under the skin into the appropriate area as directed Daily. Inject 25 units QHS (Patient taking differently: Inject 40 Units under the skin into the appropriate area as directed Daily.)   6/4/2024    B-D UF III MINI PEN NEEDLES 31G X 5 MM misc TEST EVERY  each 1     Lancets (freestyle) lancets FreeStyle Lancets       metFORMIN ER (GLUCOPHAGE-XR) 750 MG 24 hr tablet Take 2 tabs before breakfast. (Patient not taking: Reported on 5/20/2024) 180 tablet 3        Scheduled Meds:amLODIPine, 10 mg, Oral, Q24H  atorvastatin, 80 mg, Oral, Daily  Barium Sulfate, 1 teaspoon(s), Oral, Once in imaging  budesonide, 0.5 mg, Nebulization, BID - RT  dexAMETHasone, 4 mg, Intravenous, Q12H  [Held by provider] insulin glargine, 40 Units, Subcutaneous, Nightly  insulin lispro, 4-24 Units, Subcutaneous, Q6H  ipratropium-albuterol, 3 mL, Nebulization, 4x Daily - RT  metoprolol succinate XL, 25 mg, Oral, Daily  pantoprazole, 40 mg, Oral, Daily  sodium chloride, 10 mL, Intravenous, Q12H      Continuous Infusions:Pharmacy Consult - Steroid Insulin Protocol,   sodium chloride, 100 mL/hr, Last Rate: 100 mL/hr (06/10/24 0532)      PRN Meds:.  acetaminophen **OR** acetaminophen **OR** acetaminophen    senna-docusate sodium **AND** polyethylene  glycol **AND** bisacodyl **AND** bisacodyl    cyclobenzaprine    dextrose    dextrose    glucagon (human recombinant)    hydrALAZINE    HYDROcodone-acetaminophen    melatonin    Morphine **OR** Morphine    [DISCONTINUED] HYDROmorphone **AND** naloxone    ondansetron ODT **OR** ondansetron    Pharmacy Consult - Steroid Insulin Protocol    sodium chloride    sodium chloride    ALLERGIES:  Adhesive tape, Latex, Methocarbamol, Pregabalin, and Silver    ROS:  The following systems were reviewed and negative;   Constitution:  No fevers, chills, no unintentional weight loss  Skin: no rash, no jaundice  Eyes:  No blurry vision, no eye pain  HENT:  No change in hearing or smell  Resp:  No dyspnea or cough  CV:  No chest pain or palpitations  :  No dysuria, hematuria  Musculoskeletal:  No leg cramps or arthralgias  Neuro:  No tremor, no numbness  Psych:  No depression or confusion    Objective     Vital Signs:   Vitals:    06/10/24 0715 06/10/24 0716 06/10/24 0717 06/10/24 1142   BP:    156/76   BP Location:    Left arm   Patient Position:    Sitting   Pulse: 71 74 74 64   Resp:  18 18 12   Temp:    97.9 °F (36.6 °C)   TempSrc:    Oral   SpO2: 96% 96% 96% 96%   Weight:       Height:           Physical Exam:       General Appearance:    Awake and alert, in no acute distress   Head:    Normocephalic, without obvious abnormality, atraumatic   Throat:   No oral lesions, no thrush, oral mucosa moist   Lungs:     Respirations regular, even and unlabored   Chest Wall:    No abnormalities observed   Abdomen:     Soft, non-tender, no rebound or guarding, non-distended   Rectal:     Deferred   Extremities:   Moves all extremities, no edema, no cyanosis   Pulses:   Pulses palpable and equal bilaterally   Skin:   No rash, no jaundice, normal palpation   Lymph nodes:   No cervical, supraclavicular or submandibular palpable adenopathy   Neurologic:   Cranial nerves 2 - 12 grossly intact, no asterixis       Results Review:   I reviewed  the patient's labs and imaging.  CBC    Results from last 7 days   Lab Units 06/10/24  0021 06/07/24  0006 06/06/24  1557 06/06/24  0949 06/06/24  0837   WBC 10*3/mm3 6.28 6.27  --   --   --    HEMOGLOBIN g/dL 13.9 13.5  --   --   --    HEMOGLOBIN, POC g/dL  --   --  15.0 11.9* 11.9*   PLATELETS 10*3/mm3 124* 134*  --   --   --      CMP   Results from last 7 days   Lab Units 06/10/24  0021 06/07/24  0006   SODIUM mmol/L 137 135*   POTASSIUM mmol/L 4.1 4.6   CHLORIDE mmol/L 106 102   CO2 mmol/L 22.4 21.5*   BUN mg/dL 23 16   CREATININE mg/dL 0.69* 0.83   GLUCOSE mg/dL 184* 282*     Cr Clearance Estimated Creatinine Clearance: 134.6 mL/min (A) (by C-G formula based on SCr of 0.69 mg/dL (L)).  Coag     HbA1C   Lab Results   Component Value Date    HGBA1C 8.80 (H) 05/28/2024    HGBA1C 9.70 (H) 12/30/2023    HGBA1C 10.2 (H) 11/03/2022     Blood Glucose   Glucose   Date/Time Value Ref Range Status   06/10/2024 1141 146 (H) 70 - 105 mg/dL Final     Comment:     Serial Number: [id number]:  [id number]   06/10/2024 0530 107 (H) 70 - 105 mg/dL Final     Comment:     Serial Number: [id number]:  [id number]   06/10/2024 0009 185 (H) 70 - 105 mg/dL Final     Comment:     Serial Number: [id number]:  [id number]   06/09/2024 1712 178 (H) 70 - 105 mg/dL Final     Comment:     Serial Number: [id number]:  [id number]   06/09/2024 1221 175 (H) 70 - 105 mg/dL Final     Comment:     Serial Number: [id number]:  [id number]   06/09/2024 0552 198 (H) 70 - 105 mg/dL Final     Comment:     Serial Number: [id number]:  [id number]   06/09/2024 0028 192 (H) 70 - 105 mg/dL Final     Comment:     Serial Number: [id number]:  [id number]   06/08/2024 1802 177 (H) 70 - 105 mg/dL Final     Comment:     Serial Number: [id number]:  [id number]     Infection     UA      Imaging Results (Last 72 Hours)       Procedure Component Value Units Date/Time    FL Video Swallow With Speech Single Contrast [[id number]]  Resulted: 06/10/24 1052     Updated: 06/10/24 1052    Narrative:      This procedure was auto-finalized with no dictation required.            ASSESSMENT:  -Dysphagia  -Failed video swallow evaluation  -Nausea  -Cervical myelopathy s/p ACDF on 6/6/2024  -History of DVT  -Depression  -DMII  -Hyperlipidemia  -History of hernia repair  -History of appendectomy    PLAN:  Patient is a 74-year-old male with recent ACDF on 6/6/2024 who was admitted postoperatively.  He has been having difficulty with significant dysphagia and failed video swallow evaluation earlier today.  GI asked to consult due to feeding difficulty and dysphagia.    We will plan EGD with PEG tube placement tomorrow.  Maintain NPO.  Case was discussed with neurosurgery and we are safe to proceed with EGD/PEG tube placement from their standpoint.  Start PPI.  Consult dietitian for tube feed recommendations.  Neurosurgery, speech therapy, and hospitalist service following.  Supportive care.      I discussed the patients findings and my recommendations with the patient.  I will discuss case with Dr. Dobson and change plan accordingly.    We appreciate the referral.    Electronically signed by SHAWN Collins, 06/10/24, 1:59 PM EDT.

## 2024-06-10 NOTE — PLAN OF CARE
Problem: Asthma Comorbidity  Goal: Maintenance of Asthma Control  Outcome: Ongoing, Progressing     Problem: Behavioral Health Comorbidity  Goal: Maintenance of Behavioral Health Symptom Control  Outcome: Ongoing, Progressing     Problem: COPD (Chronic Obstructive Pulmonary Disease) Comorbidity  Goal: Maintenance of COPD Symptom Control  Outcome: Ongoing, Progressing     Problem: Diabetes Comorbidity  Goal: Blood Glucose Level Within Targeted Range  Outcome: Ongoing, Progressing     Problem: Heart Failure Comorbidity  Goal: Maintenance of Heart Failure Symptom Control  Outcome: Ongoing, Progressing     Problem: Hypertension Comorbidity  Goal: Blood Pressure in Desired Range  Outcome: Ongoing, Progressing     Problem: Obstructive Sleep Apnea Risk or Actual Comorbidity Management  Goal: Unobstructed Breathing During Sleep  Outcome: Ongoing, Progressing     Problem: Osteoarthritis Comorbidity  Goal: Maintenance of Osteoarthritis Symptom Control  Outcome: Ongoing, Progressing     Problem: Pain Chronic (Persistent) (Comorbidity Management)  Goal: Acceptable Pain Control and Functional Ability  Outcome: Ongoing, Progressing     Problem: Seizure Disorder Comorbidity  Goal: Maintenance of Seizure Control  Outcome: Ongoing, Progressing     Problem: Fall Injury Risk  Goal: Absence of Fall and Fall-Related Injury  Outcome: Ongoing, Progressing  Intervention: Promote Injury-Free Environment  Recent Flowsheet Documentation  Taken 6/10/2024 1000 by Monica Hassan RN  Safety Promotion/Fall Prevention: patient off unit  Taken 6/10/2024 0800 by Monica Hassan RN  Safety Promotion/Fall Prevention: safety round/check completed     Problem: Skin Injury Risk Increased  Goal: Skin Health and Integrity  Outcome: Ongoing, Progressing

## 2024-06-10 NOTE — CASE MANAGEMENT/SOCIAL WORK
Continued Stay Note  CHRISTINA Fitzgerald     Patient Name: Gurinder Amezcua  MRN: [id number]  Today's Date: 6/10/2024    Admit Date: 6/6/2024    Plan: Home vs rehab .  Family can transport at discharge.  PEG  tube placement 6/11/24   Discharge Plan       Row Name 06/10/24 1725       Plan    Plan Home vs rehab .  Family can transport at discharge.  PEG  tube placement 6/11/24    Plan Comments DC barriers: pod #4anterior cervical fusion, swallowing problems  scheduled for PEG placement 6.11.24, then toleration of tube feeds once initiated.  Patient may need rehab               Ashlyn Christian RN    SIPS 1  [email or web address]  Office [phone]  Cell [phone]

## 2024-06-11 ENCOUNTER — ANESTHESIA (OUTPATIENT)
Dept: GASTROENTEROLOGY | Facility: HOSPITAL | Age: 74
End: 2024-06-11
Payer: MEDICARE

## 2024-06-11 ENCOUNTER — INPATIENT HOSPITAL (OUTPATIENT)
Dept: URBAN - METROPOLITAN AREA HOSPITAL 84 | Facility: HOSPITAL | Age: 74
End: 2024-06-11

## 2024-06-11 ENCOUNTER — ANESTHESIA EVENT (OUTPATIENT)
Dept: GASTROENTEROLOGY | Facility: HOSPITAL | Age: 74
End: 2024-06-11
Payer: MEDICARE

## 2024-06-11 DIAGNOSIS — R63.30 FEEDING DIFFICULTIES, UNSPECIFIED: ICD-10-CM

## 2024-06-11 DIAGNOSIS — K20.90 ESOPHAGITIS, UNSPECIFIED WITHOUT BLEEDING: ICD-10-CM

## 2024-06-11 DIAGNOSIS — K31.7 POLYP OF STOMACH AND DUODENUM: ICD-10-CM

## 2024-06-11 DIAGNOSIS — Z98.1 S/P SPINAL FUSION: Primary | ICD-10-CM

## 2024-06-11 LAB
ABO GROUP BLD: NORMAL
BLD GP AB SCN SERPL QL: NEGATIVE
GLUCOSE BLDC GLUCOMTR-MCNC: 112 MG/DL (ref 70–105)
GLUCOSE BLDC GLUCOMTR-MCNC: 129 MG/DL (ref 70–105)
GLUCOSE BLDC GLUCOMTR-MCNC: 160 MG/DL (ref 70–105)
RH BLD: NEGATIVE
T&S EXPIRATION DATE: NORMAL

## 2024-06-11 PROCEDURE — 43246 EGD PLACE GASTROSTOMY TUBE: CPT | Performed by: INTERNAL MEDICINE

## 2024-06-11 PROCEDURE — 94761 N-INVAS EAR/PLS OXIMETRY MLT: CPT

## 2024-06-11 PROCEDURE — 93010 ELECTROCARDIOGRAM REPORT: CPT | Performed by: INTERNAL MEDICINE

## 2024-06-11 PROCEDURE — 25010000002 LIDOCAINE 1 % SOLUTION: Performed by: INTERNAL MEDICINE

## 2024-06-11 PROCEDURE — 82948 REAGENT STRIP/BLOOD GLUCOSE: CPT

## 2024-06-11 PROCEDURE — 25010000002 MORPHINE PER 10 MG: Performed by: HOSPITALIST

## 2024-06-11 PROCEDURE — 94799 UNLISTED PULMONARY SVC/PX: CPT

## 2024-06-11 PROCEDURE — 25010000002 CEFAZOLIN PER 500 MG: Performed by: NURSE PRACTITIONER

## 2024-06-11 PROCEDURE — 25810000003 SODIUM CHLORIDE 0.9 % SOLUTION: Performed by: NEUROLOGICAL SURGERY

## 2024-06-11 PROCEDURE — 99024 POSTOP FOLLOW-UP VISIT: CPT

## 2024-06-11 PROCEDURE — 82948 REAGENT STRIP/BLOOD GLUCOSE: CPT | Performed by: HOSPITALIST

## 2024-06-11 PROCEDURE — 0DH68UZ INSERTION OF FEEDING DEVICE INTO STOMACH, VIA NATURAL OR ARTIFICIAL OPENING ENDOSCOPIC: ICD-10-PCS | Performed by: INTERNAL MEDICINE

## 2024-06-11 PROCEDURE — 63710000001 INSULIN LISPRO (HUMAN) PER 5 UNITS: Performed by: NEUROLOGICAL SURGERY

## 2024-06-11 PROCEDURE — 3E0G76Z INTRODUCTION OF NUTRITIONAL SUBSTANCE INTO UPPER GI, VIA NATURAL OR ARTIFICIAL OPENING: ICD-10-PCS | Performed by: NEUROLOGICAL SURGERY

## 2024-06-11 PROCEDURE — 94664 DEMO&/EVAL PT USE INHALER: CPT

## 2024-06-11 PROCEDURE — 25010000002 DEXAMETHASONE PER 1 MG: Performed by: NEUROLOGICAL SURGERY

## 2024-06-11 PROCEDURE — 93005 ELECTROCARDIOGRAM TRACING: CPT | Performed by: NEUROLOGICAL SURGERY

## 2024-06-11 PROCEDURE — 25010000002 PROPOFOL 200 MG/20ML EMULSION: Performed by: NURSE ANESTHETIST, CERTIFIED REGISTERED

## 2024-06-11 RX ORDER — IPRATROPIUM BROMIDE AND ALBUTEROL SULFATE 2.5; .5 MG/3ML; MG/3ML
3 SOLUTION RESPIRATORY (INHALATION) EVERY 4 HOURS PRN
Status: DISCONTINUED | OUTPATIENT
Start: 2024-06-11 | End: 2024-06-12 | Stop reason: HOSPADM

## 2024-06-11 RX ORDER — LIDOCAINE HYDROCHLORIDE 10 MG/ML
INJECTION, SOLUTION INFILTRATION; PERINEURAL AS NEEDED
Status: DISCONTINUED | OUTPATIENT
Start: 2024-06-11 | End: 2024-06-11 | Stop reason: HOSPADM

## 2024-06-11 RX ORDER — DEXTROSE MONOHYDRATE 25 G/50ML
25 INJECTION, SOLUTION INTRAVENOUS
Status: DISCONTINUED | OUTPATIENT
Start: 2024-06-11 | End: 2024-06-12 | Stop reason: HOSPADM

## 2024-06-11 RX ORDER — AMLODIPINE BESYLATE 5 MG/1
10 TABLET ORAL
Status: DISCONTINUED | OUTPATIENT
Start: 2024-06-12 | End: 2024-06-12 | Stop reason: HOSPADM

## 2024-06-11 RX ORDER — LIDOCAINE HYDROCHLORIDE 20 MG/ML
INJECTION, SOLUTION EPIDURAL; INFILTRATION; INTRACAUDAL; PERINEURAL AS NEEDED
Status: DISCONTINUED | OUTPATIENT
Start: 2024-06-11 | End: 2024-06-11 | Stop reason: SURG

## 2024-06-11 RX ORDER — PROPOFOL 10 MG/ML
INJECTION, EMULSION INTRAVENOUS AS NEEDED
Status: DISCONTINUED | OUTPATIENT
Start: 2024-06-11 | End: 2024-06-11 | Stop reason: SURG

## 2024-06-11 RX ORDER — IBUPROFEN 600 MG/1
1 TABLET ORAL
Status: DISCONTINUED | OUTPATIENT
Start: 2024-06-11 | End: 2024-06-12 | Stop reason: HOSPADM

## 2024-06-11 RX ORDER — ATORVASTATIN CALCIUM 40 MG/1
80 TABLET, FILM COATED ORAL DAILY
Status: DISCONTINUED | OUTPATIENT
Start: 2024-06-12 | End: 2024-06-12 | Stop reason: HOSPADM

## 2024-06-11 RX ORDER — INSULIN LISPRO 100 [IU]/ML
2-9 INJECTION, SOLUTION INTRAVENOUS; SUBCUTANEOUS EVERY 6 HOURS SCHEDULED
Status: DISCONTINUED | OUTPATIENT
Start: 2024-06-11 | End: 2024-06-12 | Stop reason: HOSPADM

## 2024-06-11 RX ORDER — NICOTINE POLACRILEX 4 MG
15 LOZENGE BUCCAL
Status: DISCONTINUED | OUTPATIENT
Start: 2024-06-11 | End: 2024-06-12 | Stop reason: HOSPADM

## 2024-06-11 RX ADMIN — PANTOPRAZOLE SODIUM 40 MG: 40 INJECTION, POWDER, FOR SOLUTION INTRAVENOUS at 18:16

## 2024-06-11 RX ADMIN — PROPOFOL 100 MG: 10 INJECTION, EMULSION INTRAVENOUS at 11:44

## 2024-06-11 RX ADMIN — IPRATROPIUM BROMIDE AND ALBUTEROL SULFATE 3 ML: .5; 3 SOLUTION RESPIRATORY (INHALATION) at 07:19

## 2024-06-11 RX ADMIN — BUDESONIDE 0.5 MG: 0.5 INHALANT RESPIRATORY (INHALATION) at 07:23

## 2024-06-11 RX ADMIN — SODIUM CHLORIDE 2 G: 900 INJECTION INTRAVENOUS at 15:07

## 2024-06-11 RX ADMIN — LIDOCAINE HYDROCHLORIDE 50 MG: 20 INJECTION, SOLUTION EPIDURAL; INFILTRATION; INTRACAUDAL; PERINEURAL at 11:39

## 2024-06-11 RX ADMIN — SODIUM CHLORIDE 100 ML/HR: 900 INJECTION, SOLUTION INTRAVENOUS at 09:49

## 2024-06-11 RX ADMIN — SODIUM CHLORIDE 100 ML/HR: 900 INJECTION, SOLUTION INTRAVENOUS at 22:11

## 2024-06-11 RX ADMIN — MORPHINE SULFATE 2 MG: 2 INJECTION, SOLUTION INTRAMUSCULAR; INTRAVENOUS at 22:08

## 2024-06-11 RX ADMIN — DEXAMETHASONE SODIUM PHOSPHATE 4 MG: 4 INJECTION, SOLUTION INTRAMUSCULAR; INTRAVENOUS at 22:08

## 2024-06-11 RX ADMIN — MORPHINE SULFATE 2 MG: 2 INJECTION, SOLUTION INTRAMUSCULAR; INTRAVENOUS at 12:55

## 2024-06-11 RX ADMIN — MORPHINE SULFATE 2 MG: 2 INJECTION, SOLUTION INTRAMUSCULAR; INTRAVENOUS at 07:01

## 2024-06-11 RX ADMIN — Medication 10 ML: at 22:34

## 2024-06-11 RX ADMIN — IPRATROPIUM BROMIDE AND ALBUTEROL SULFATE 3 ML: .5; 3 SOLUTION RESPIRATORY (INHALATION) at 10:30

## 2024-06-11 RX ADMIN — INSULIN LISPRO 2 UNITS: 100 INJECTION, SOLUTION INTRAVENOUS; SUBCUTANEOUS at 18:16

## 2024-06-11 RX ADMIN — PROPOFOL 100 MG: 10 INJECTION, EMULSION INTRAVENOUS at 11:39

## 2024-06-11 RX ADMIN — PROPOFOL 50 MG: 10 INJECTION, EMULSION INTRAVENOUS at 11:48

## 2024-06-11 RX ADMIN — DEXAMETHASONE SODIUM PHOSPHATE 4 MG: 4 INJECTION, SOLUTION INTRAMUSCULAR; INTRAVENOUS at 09:50

## 2024-06-11 NOTE — ANESTHESIA PREPROCEDURE EVALUATION
Anesthesia Evaluation                  Airway   Mallampati: II  TM distance: >3 FB  Neck ROM: full  No difficulty expected  Dental - normal exam     Pulmonary - normal exam    breath sounds clear to auscultation  (+) lung cancer,  Cardiovascular - normal exam    Rhythm: regular  Rate: normal    (+) CAD, angina with exertion, DVT, hyperlipidemia,  carotid artery disease  (-) past MI    ROS comment: Heart Cath Report    NAME:              Gurinder Amezcua  :                1950  AGE/SEX:        73 y.o. male  MRN:                [id number]  ADM DATE:      [unfilled]  DOS:                       Pre-Procedure Notes  H&P Performed  [x]  Yes [ ] No       [ ]  N/A    Indications:   [ ]  ACS <= 24 HRS   [ ]  ACS >24 HRS   [ x]  New Onset Angina <= 2 mos   [ ]  Worsening Angina   [ ]  Resuscitated Cardiac Arrest   [ ]  Angina on Exertion:   [ ]  Suspected CAD   [ ]  Valvular Disease   [ ] Pericardial Disease   [ ]  Cardiac Arrythmia   [ ]  Cardiomyopathy   [ ]  LV Dysfunction   [ ]  Syncope   [ ]  Post Cardiac Transplant   [ ]  Eval. For Exercise Clearance   x[ ]  Abnormal Stress Test    [ ]  Other   [ ]  Pre-Operative Evaluation       If Pre-Op Eval:   Evaluation for Surgery Type:  [ ]  Cardiac Surgery   [ ]  Non-Cardiac Surgery   Functional Capacity:  [ ]  <4 METS  [ ]  >=4 METS w/o symptoms          [ ]  >= 4 METS with symptoms  [ ]  Unknown   Surgical Risk:  [ ]  Low  [ ] Intermediate         [ ]  High Risk: Vascular  [ ]  High Risk Non-Vascular    Risks, Benefits, & Complications Discussed:  [x]  Yes  [ ]  No  [ ]  N/A    Questions Answered:  [x]  Yes  [ ]  No  [ ]  N/A    Consent Obtained:  [x]  Yes  [ ]  No  [ ] N/A    CHF: [ ]  Yes  [ x]  No     If Yes:  Newly Diagnosed?  [ ]  Yes  [ ]  No   If Yes:  HF Type:  [ ]  Diastolic  [ ]  Systolic  [ ]  Unknown       Procedure Description  The patient underwent successful [ x]  Left  [ ]  Right  [ ]  Left & Right Heart catheterization and coronary angiography via the    [x ]  Femoral approach  [ ]  Radial approach  [ ]  Brachial approach    Procedure Narrative:   Informed consent was obtained from the patient after explaining risks and benefits.  Patient was brought to the cardiac catheterization laboratory and placed on the table in the usual fashion.  Right groin was shaved and prepped in sterile fashion. Moderate conscious sedation was given utilizing IV Versed and fentanyl administered by RN with continuous EKG oximetry and hemodynamic monitoring supervised by me throughout the entire case, conscious sedation time was 30 minutes.  I was present with the patient for the duration of moderate sedation and supervised staff who had no other duties and monitored the patient for the entire procedure patient had Smith 2-3 sedation scale. 2% lidocaine was used for local anesthesia to the right groin.  A total of 20 cc was used.  A 6 Peruvian sheath was placed in the right femoral artery.  A 6 Peruvian pigtail catheter, 6 Peruvian JR4 catheter and a 6 Peruvian JL4 catheter was used for the procedure.  After the cardiac catheterization is complete, all the catheters and sheath were removed.  Patient tolerated the procedure very well.  No complications noted.      Hemodynamic    LVEDP:8   Estimated EF %:55-60      Initial Aortic Pressure: 130/80    AV Gradient: No gradient    Rt. Heart Pressure:    Wall Motion:      Dominance:  [ ]x  Left  [ ]  Right  [ ]  Co-Dominant      Coronary Arteriography: (Please Code highest degree of stenosis)    Left Main %:   0  Proximal LAD %:  0  Mid/Distal LAD %: 30 to 40%  LCX %: 30%  Ramus: 90 to 99%  RCA %: 30%  Lima %:    SVG(s) %:         Complications: No complications    Estimated Blood Loss: None      Impression and Recommendation: Severe single-vessel coronary disease  Normal LV function  Will continue medical therapy and perform PCI to the ramus intermedius branch if he has any symptoms on maximal medical therapy    Electronically signed by Akin  MD Kevin, 01/04/24, 10:14 AM EST    Plan is for moderate sedation. An immediate assessment was done prior to the administration of moderate sedation. Under my direct supervision, intravenous moderate sedation sedation was administered during the course of this procedure with continuous monitoring of hemodynamic parameters and level of consciousness by an independent trained observer. Less than 20 mL of estimated blood loss during the case. No specimen was collected during the case.        Neuro/Psych  (+) headaches, numbness, psychiatric history Anxiety and Depression  GI/Hepatic/Renal/Endo    (+) GERD, renal disease- CRI, diabetes mellitus    Musculoskeletal     (+) neck pain, radiculopathy  Abdominal  - normal exam   Substance History      OB/GYN          Other   arthritis,     ROS/Med Hx Other: ASSESSMENT:  -Dysphagia  -Failed video swallow evaluation  -Nausea  -Cervical myelopathy s/p ACDF on 6/6/2024  -History of DVT  -Depression  -DMII  -Hyperlipidemia  -History of hernia repair  -History of appendectomy     PLAN:  Patient is a 74-year-old male with recent ACDF on 6/6/2024 who was admitted postoperatively.  He has been having difficulty with significant dysphagia and failed video swallow evaluation earlier today.  GI asked to consult due to feeding difficulty and dysphagia.     We will plan EGD with PEG tube placement tomorrow.  Maintain NPO.  Case was discussed with neurosurgery and we are safe to proceed with EGD/PEG tube placement from their standpoint.  Start PPI.  Consult dietitian for tube feed recommendations.  Neurosurgery, speech therapy, and hospitalist service following.  Supportive care.        I discussed the patients findings and my recommendations with the patient.  I will discuss case with Dr. Dobson and change plan accordingly.     We appreciate the referral.                     Anesthesia Plan    ASA 4     general     (S/P ACDF. Unable to swallow, PEG)  intravenous induction     Anesthetic  plan, risks, benefits, and alternatives have been provided, discussed and informed consent has been obtained with: patient.    CODE STATUS:    Level Of Support Discussed With: Patient  Code Status (Patient has no pulse and is not breathing): CPR (Attempt to Resuscitate)  Medical Interventions (Patient has pulse or is breathing): Full Support

## 2024-06-11 NOTE — PLAN OF CARE
Goal Outcome Evaluation:         Patient had peg tube placement today, call light in reach

## 2024-06-11 NOTE — PLAN OF CARE
Goal Outcome Evaluation:      Pt alert and oriented. Pt complaints of pain treated with IV medication. Pt scheduled for EGD and PEG tube placement later today. Pt receiving IV fluids. VS stable. Plan of care ongoing.

## 2024-06-11 NOTE — OP NOTE
ESOPHAGOGASTRODUODENOSCOPY WITH PERCUTANEOUS ENDOSCOPIC GASTROSTOMY TUBE INSERTION Procedure Report    Patient Name:  Gurinder Amezcua  YOB: 1950    Date of Surgery:  6/11/2024     Pre-Op Diagnosis:  Feeding difficulties [R63.30]  Dysphagia, unspecified type [R13.10]       Post-Op Diagnosis Codes:     * Feeding difficulties [R63.30]     * Dysphagia, unspecified type [R13.10]    Post-Op Diagnosis:  1.  Grade a erosive esophagitis  2.  Fundic gland polyps  3.  Successful 20 Burundian PEG placement      Procedure/CPT® Codes:      Procedure(s):  ESOPHAGOGASTRODUODENOSCOPY WITH PERCUTANEOUS ENDOSCOPIC GASTROSTOMY TUBE INSERTION    Staff:  Surgeon(s):  Niranjan Rich MD      Anesthesia: Monitored Anesthesia Care    Description of Procedure:  A description of the procedure as well as risks, benefits and alternative methods were explained to the patient and or power of  who voiced understanding and signed the corresponding consent form. A physical exam was performed and vital signs were monitored throughout the procedure.    An Olympus video gastroscope was inserted into the oropharynx and the esophagus was intubated without difficulty.  The gastroscope was advanced through the esophagus, stomach and second portion of the duodenum without difficulty. The scope was then retroflexed and the fundus was visualized.  With the room lights turned out, he suitable position for PEG placement was identified in the upper abdomen.  The abdomen was prepped with ChloraPrep and draped with a sterile towel.  A 1 cm skin incision was made with a #11 blade and a PEG trocar was inserted through the incision and into the stomach.  A wire loop was passed through the PEG trocar and into the stomach.  The wire loop was snared through the endoscope and brought out through the mouth.  Then a 20 Burundian PULL PEG was pulled through the mouth esophagus stomach and down through the anterior abdominal wall using standard pull  technique.  The endoscope was passed into the stomach and the internal bumper was noted to be in good position.  The endoscope was therefore withdrawn and the gastrostomy tube was trimmed and dressed and an external bumper was applied at skin level.  The procedure was not difficult and there were no immediate complications.  There was no blood loss.    Impression:  1.  Grade a erosive esophagitis  2.  Fundic gland gastric polyps  3.  Successful 20 Maldivian PEG placement    Recommendations:  1.  Begin routine feedings and medications  2.  Begin routine local PEG care  3.  Follow-up in my office in 12 months for G-tube removal or exchange      Niranjan Rich MD     Date: 6/11/2024    Time: 11:54 EDT

## 2024-06-11 NOTE — PROGRESS NOTES
Neurosurgery Progress Note    Date: 24     Patient: Gurinder Amezcua   Sex: male   : 1950      SUBJECTIVE    CC: Postop day 5 for cervical 3-5 anterior cervical discectomy and fusion    Interval history:  Patient sitting up in bed this morning watching television.  Patient has no new complaint this morning.  Patient is set and ready for PEG to procedure.    Current Medications:  Scheduled Meds:amLODIPine, 10 mg, Oral, Q24H  atorvastatin, 80 mg, Oral, Daily  Barium Sulfate, 1 teaspoon(s), Oral, Once in imaging  budesonide, 0.5 mg, Nebulization, BID - RT  dexAMETHasone, 4 mg, Intravenous, Q12H  [Held by provider] insulin glargine, 40 Units, Subcutaneous, Nightly  insulin lispro, 4-24 Units, Subcutaneous, Q6H  ipratropium-albuterol, 3 mL, Nebulization, 4x Daily - RT  metoprolol succinate XL, 25 mg, Oral, Daily  pantoprazole, 40 mg, Intravenous, BID AC  sodium chloride, 10 mL, Intravenous, Q12H      Continuous Infusions:Pharmacy Consult - Steroid Insulin Protocol,   sodium chloride, 100 mL/hr, Last Rate: 100 mL/hr (06/10/24 0532)      PRN Meds:   acetaminophen **OR** acetaminophen **OR** acetaminophen    senna-docusate sodium **AND** polyethylene glycol **AND** bisacodyl **AND** bisacodyl    cyclobenzaprine    dextrose    dextrose    glucagon (human recombinant)    hydrALAZINE    HYDROcodone-acetaminophen    melatonin    Morphine **OR** Morphine    [DISCONTINUED] HYDROmorphone **AND** naloxone    ondansetron ODT **OR** ondansetron    Pharmacy Consult - Steroid Insulin Protocol    sodium chloride    sodium chloride      OBJECTIVE  Vitals:    06/10/24 1445 06/10/24 1448 06/10/24 2124 24 0532   BP:   121/73 119/70   BP Location:   Left arm Left arm   Patient Position:   Lying Lying   Pulse: 93 97 87 71   Resp:    Temp:   97.4 °F (36.3 °C) 97.5 °F (36.4 °C)   TempSrc:   Oral Oral   SpO2: 93% 97% 94% 95%   Weight:       Height:           Physical exam    General  - WD/WN male, appears their  stated age  - Awake, cooperative, in no acute distress  HEENT  - Normocephalic, atraumatic  - PERRLA, EOM intact  Respiratory  - Normal respiratory rate and effort  Musculoskeletal  - No joint redness, swelling, or tenderness  Skin  - Warm and dry, no bleeding, bruising, or rash.  Surgical incision covered with dressing.  NEUROLOGIC  - A/O x3, GCS 4-6-5  - CN II-XII grossly intact  - Moves all extremities symmetrically and w/ 5/5 strength  - Sensation intact throughout  - Negative pronator drift  - Normal finger-nose coordination      Results review  CBC          6/6/2024    08:37 6/6/2024    09:49 6/6/2024    15:57 6/7/2024    00:06 6/10/2024    00:21 6/10/2024    23:13   CBC   WBC    6.27  6.28  6.32    RBC    4.54  4.62  4.84    Hemoglobin 11.9  11.9  15.0  13.5  13.9  14.3    Hematocrit 35  35  44  40.6  41.7  43.6    MCV    89.4  90.3  90.1    MCH    29.7  30.1  29.5    MCHC    33.3  33.3  32.8    RDW    13.2  13.3  13.3    Platelets    134  124  111        BMP          5/28/2024    06:40 6/7/2024    00:06 6/10/2024    00:21 6/10/2024    23:13   BMP   BUN 19  16  23  23    Creatinine 0.94  0.83  0.69  0.73    Sodium 142  135  137  139    Potassium 3.7  4.6  4.1  3.7    Chloride 105  102  106  107    CO2 26.0  21.5  22.4  24.7    Calcium 9.2  9.1  8.7  8.8          XR Spine Cervical 2 or 3 View    Result Date: 6/6/2024  This procedure was auto-finalized with no dictation required.                   ASSESSMENT/PLAN  This is a 74 y.o. male having undergone ACDF surgery and is experiencing post-operative dysphagia. Patient is still have difficulty with swallowing, but has slightly improved. Patient had a barium swallow test yesterday, which he did not pass. Due to this the patient is scheduled to have a peg tube placed today. Patient is prepared and ready for today's procedure. Patient has no other complaints at this time and is neurologically intact. Please reach out with any questions or concerns.     Plan:  C3-5  ACDF  - PEG tube placement today  - Continue pain management  - Continue bowel regimen  - PT/OT    I discussed my assessment and recommendations with Dr. Yuki Wilkinson PA-C  06/11/24  07:01 EDT      Part of this note may be an electronic transcription/translation of spoken language to printed text using the Dragon Dictation System.

## 2024-06-11 NOTE — ANESTHESIA POSTPROCEDURE EVALUATION
Patient: Gurinder Amezcua    Procedure Summary       Date: 06/11/24 Room / Location: [facility] ENDOSCOPY 1 / [facility] ENDOSCOPY    Anesthesia Start: 1133 Anesthesia Stop: 1153    Procedure: ESOPHAGOGASTRODUODENOSCOPY WITH PERCUTANEOUS ENDOSCOPIC GASTROSTOMY TUBE INSERTION Diagnosis:       Feeding difficulties      Dysphagia, unspecified type      (Feeding difficulties [R63.30])      (Dysphagia, unspecified type [R13.10])    Surgeons: Niranjan Rich MD Provider: Fam Araiza MD    Anesthesia Type: general ASA Status: 4            Anesthesia Type: general    Vitals  Vitals Value Taken Time   BP 91/56 06/11/24 1223   Temp     Pulse 84 06/11/24 1225   Resp     SpO2 96 % 06/11/24 1225   Vitals shown include unfiled device data.        Post Anesthesia Care and Evaluation    Patient location during evaluation: PHASE II  Patient participation: complete - patient participated  Level of consciousness: awake  Pain scale: See nurse's notes for pain score.  Pain management: adequate    Airway patency: patent  Anesthetic complications: No anesthetic complications  PONV Status: none  Cardiovascular status: acceptable  Respiratory status: acceptable and spontaneous ventilation  Hydration status: acceptable    Comments: Patient seen and examined postoperatively; vital signs stable; SpO2 greater than or equal to 90%; cardiopulmonary status stable; nausea/vomiting adequately controlled; pain adequately controlled; no apparent anesthesia complications; patient discharged from anesthesia care when discharge criteria were met
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
## 2024-06-11 NOTE — PLAN OF CARE
Goal Outcome Evaluation:   ST following for dysphagia. Chart reviewed and events noted. Patient to have PEG tube placed today. Will follow for continued swallow therapy while in-house. Will require extensive dysphagia rehab at next level of care upon discharge.                   Anticipated Discharge Disposition (SLP): inpatient rehabilitation facility

## 2024-06-11 NOTE — PROGRESS NOTES
[facility] MEDICINE SERVICE  DAILY PROGRESS NOTE    NAME: Gurinder Amezcua  : 1950  MRN: [id number]      LOS: 3 days     PROVIDER OF SERVICE: Alfredo Jiménez MD    Chief Complaint: Cervical myelopathy    Subjective:     Interval History:  History taken from: patient chart RN  No new symptoms.  Awaiting PEG tube placement    Review of Systems:   Review of Systems  All negative except as above  Objective:     Vital Signs  Temp:  [97.4 °F (36.3 °C)-98.3 °F (36.8 °C)] 97.4 °F (36.3 °C)  Heart Rate:  [69-97] 69  Resp:  [12-19] 15  BP: (119-147)/(70-93) 147/82   Body mass index is 26.09 kg/m².    Physical Exam  Physical Exam  AOx3 NAD  RRR S1-S2 audible  Lungs CTA  Abdomen soft nontender nondistended  Scheduled Meds   amLODIPine, 10 mg, Oral, Q24H  atorvastatin, 80 mg, Oral, Daily  Barium Sulfate, 1 teaspoon(s), Oral, Once in imaging  budesonide, 0.5 mg, Nebulization, BID - RT  ceFAZolin, 2 g, Intravenous, Once  dexAMETHasone, 4 mg, Intravenous, Q12H  [Held by provider] insulin glargine, 40 Units, Subcutaneous, Nightly  insulin lispro, 2-9 Units, Subcutaneous, Q6H  ipratropium-albuterol, 3 mL, Nebulization, 4x Daily - RT  metoprolol succinate XL, 25 mg, Oral, Daily  pantoprazole, 40 mg, Intravenous, BID AC  sodium chloride, 10 mL, Intravenous, Q12H       PRN Meds     acetaminophen **OR** acetaminophen **OR** acetaminophen    senna-docusate sodium **AND** polyethylene glycol **AND** bisacodyl **AND** bisacodyl    cyclobenzaprine    dextrose    dextrose    glucagon (human recombinant)    hydrALAZINE    melatonin    Morphine **OR** Morphine    [DISCONTINUED] HYDROmorphone **AND** naloxone    ondansetron ODT **OR** ondansetron    Pharmacy Consult - Steroid Insulin Protocol    sodium chloride    sodium chloride   Infusions  Pharmacy Consult - Steroid Insulin Protocol,   sodium chloride, 100 mL/hr, Last Rate: 250 mL/hr (24 1151)          Diagnostic Data    Results from last 7 days   Lab Units  06/10/24  2313   WBC 10*3/mm3 6.32   HEMOGLOBIN g/dL 14.3   HEMATOCRIT % 43.6   PLATELETS 10*3/mm3 111*   GLUCOSE mg/dL 96   CREATININE mg/dL 0.73*   BUN mg/dL 23   SODIUM mmol/L 139   POTASSIUM mmol/L 3.7   AST (SGOT) U/L 15   ALT (SGPT) U/L 17   ALK PHOS U/L 80   BILIRUBIN mg/dL 0.8   ANION GAP mmol/L 7.3       No radiology results for the last day      I reviewed the patient's new clinical results.  I reviewed the patient's new imaging results and agree with the interpretation.    Assessment/Plan:     Active and Resolved Problems  Active Hospital Problems    Diagnosis  POA    **Cervical myelopathy [G95.9]  Unknown    S/P cervical spinal fusion [Z98.1]  Not Applicable    Feeding difficulties [R63.30]  Unknown    Dysphagia [R13.10]  Unknown      Resolved Hospital Problems   No resolved problems to display.       #Cervical myelopathy status post C3-5 ACDF  Management per primary neurosurgery     #Dysphagia  Since post-OP  SLP following status post barium swallow study 6/10 negative for aspiration  GI consulted 6/10 status post PEG tube placement today.  Start tube feeds titrate to goal rate per protocol  Remains on IV dexamethasone per LANCE        #DM2  Blood glucose trend reviewed  Hold OHA  Hold Lantus  ISS lispro moderate correctional scale every 6 while NPO     #Hypertension  P.o. antihypertensives on hold as patient is strict n.p.o.  CTM     Will continue to follow along    VTE Prophylaxis:  Mechanical VTE prophylaxis orders are present.         Code status is   Code Status and Medical Interventions:   Ordered at: 06/07/24 1227     Level Of Support Discussed With:    Patient     Code Status (Patient has no pulse and is not breathing):    CPR (Attempt to Resuscitate)     Medical Interventions (Patient has pulse or is breathing):    Full Support         Time: 30 minutes    Signature: Electronically signed by Alfredo Jiménez MD, 06/11/24, 14:13 EDT.  [facility] Hospitalist Team

## 2024-06-11 NOTE — SIGNIFICANT NOTE
06/11/24 1628   OTHER   Discipline physical therapy assistant   Rehab Time/Intention   Session Not Performed other (see comments)  (Pt off floor for peg tube placement upon attempt. Will reattemt tomorrow for PT as appropriate.)   Therapy Assessment/Plan (PT)   Criteria for Skilled Interventions Met (PT) yes   Recommendation   PT - Next Appointment 06/12/24

## 2024-06-12 ENCOUNTER — INPATIENT HOSPITAL (OUTPATIENT)
Dept: URBAN - METROPOLITAN AREA HOSPITAL 84 | Facility: HOSPITAL | Age: 74
End: 2024-06-12

## 2024-06-12 VITALS
SYSTOLIC BLOOD PRESSURE: 139 MMHG | WEIGHT: 219.58 LBS | BODY MASS INDEX: 25.93 KG/M2 | OXYGEN SATURATION: 95 % | HEIGHT: 77 IN | TEMPERATURE: 97.6 F | DIASTOLIC BLOOD PRESSURE: 82 MMHG | RESPIRATION RATE: 20 BRPM | HEART RATE: 79 BPM

## 2024-06-12 DIAGNOSIS — Z90.49 ACQUIRED ABSENCE OF OTHER SPECIFIED PARTS OF DIGESTIVE TRACT: ICD-10-CM

## 2024-06-12 DIAGNOSIS — R63.39 OTHER FEEDING DIFFICULTIES: ICD-10-CM

## 2024-06-12 DIAGNOSIS — R13.10 DYSPHAGIA, UNSPECIFIED: ICD-10-CM

## 2024-06-12 LAB
GLUCOSE BLDC GLUCOMTR-MCNC: 173 MG/DL (ref 70–105)
GLUCOSE BLDC GLUCOMTR-MCNC: 190 MG/DL (ref 70–105)
GLUCOSE BLDC GLUCOMTR-MCNC: 190 MG/DL (ref 70–105)
GLUCOSE BLDC GLUCOMTR-MCNC: 208 MG/DL (ref 70–105)
GLUCOSE BLDC GLUCOMTR-MCNC: 222 MG/DL (ref 70–105)
QT INTERVAL: 418 MS
QTC INTERVAL: 433 MS

## 2024-06-12 PROCEDURE — 97116 GAIT TRAINING THERAPY: CPT

## 2024-06-12 PROCEDURE — 63710000001 INSULIN GLARGINE PER 5 UNITS: Performed by: HOSPITALIST

## 2024-06-12 PROCEDURE — 63710000001 INSULIN LISPRO (HUMAN) PER 5 UNITS: Performed by: NEUROLOGICAL SURGERY

## 2024-06-12 PROCEDURE — 92526 ORAL FUNCTION THERAPY: CPT

## 2024-06-12 PROCEDURE — 97110 THERAPEUTIC EXERCISES: CPT

## 2024-06-12 PROCEDURE — 82948 REAGENT STRIP/BLOOD GLUCOSE: CPT

## 2024-06-12 PROCEDURE — 82948 REAGENT STRIP/BLOOD GLUCOSE: CPT | Performed by: INTERNAL MEDICINE

## 2024-06-12 PROCEDURE — 25010000002 MORPHINE PER 10 MG: Performed by: HOSPITALIST

## 2024-06-12 PROCEDURE — 63710000001 ONDANSETRON ODT 4 MG TABLET DISPERSIBLE: Performed by: INTERNAL MEDICINE

## 2024-06-12 PROCEDURE — 82948 REAGENT STRIP/BLOOD GLUCOSE: CPT | Performed by: HOSPITALIST

## 2024-06-12 PROCEDURE — 99232 SBSQ HOSP IP/OBS MODERATE 35: CPT | Performed by: NURSE PRACTITIONER

## 2024-06-12 PROCEDURE — 99024 POSTOP FOLLOW-UP VISIT: CPT

## 2024-06-12 PROCEDURE — 63710000001 INSULIN LISPRO (HUMAN) PER 5 UNITS: Performed by: INTERNAL MEDICINE

## 2024-06-12 PROCEDURE — 97530 THERAPEUTIC ACTIVITIES: CPT

## 2024-06-12 PROCEDURE — 25010000002 DEXAMETHASONE PER 1 MG: Performed by: INTERNAL MEDICINE

## 2024-06-12 RX ORDER — MORPHINE SULFATE 2 MG/ML
2 INJECTION, SOLUTION INTRAMUSCULAR; INTRAVENOUS EVERY 4 HOURS PRN
Status: DISCONTINUED | OUTPATIENT
Start: 2024-06-12 | End: 2024-06-12 | Stop reason: HOSPADM

## 2024-06-12 RX ORDER — ONDANSETRON 2 MG/ML
4 INJECTION INTRAMUSCULAR; INTRAVENOUS EVERY 6 HOURS PRN
Status: DISCONTINUED | OUTPATIENT
Start: 2024-06-12 | End: 2024-06-12 | Stop reason: HOSPADM

## 2024-06-12 RX ORDER — OXYCODONE HYDROCHLORIDE 5 MG/1
5 TABLET ORAL EVERY 4 HOURS PRN
Status: DISCONTINUED | OUTPATIENT
Start: 2024-06-12 | End: 2024-06-12 | Stop reason: HOSPADM

## 2024-06-12 RX ORDER — ASPIRIN 81 MG/1
81 TABLET ORAL DAILY
Start: 2024-06-12

## 2024-06-12 RX ORDER — NALOXONE HCL 0.4 MG/ML
0.4 VIAL (ML) INJECTION
Start: 2024-06-12

## 2024-06-12 RX ORDER — ONDANSETRON 4 MG/1
4 TABLET, ORALLY DISINTEGRATING ORAL EVERY 6 HOURS PRN
Status: DISCONTINUED | OUTPATIENT
Start: 2024-06-12 | End: 2024-06-12 | Stop reason: HOSPADM

## 2024-06-12 RX ORDER — DEXAMETHASONE SODIUM PHOSPHATE 4 MG/ML
2 INJECTION, SOLUTION INTRA-ARTICULAR; INTRALESIONAL; INTRAMUSCULAR; INTRAVENOUS; SOFT TISSUE EVERY 12 HOURS
Status: DISCONTINUED | OUTPATIENT
Start: 2024-06-12 | End: 2024-06-12 | Stop reason: HOSPADM

## 2024-06-12 RX ADMIN — OXYCODONE HYDROCHLORIDE 5 MG: 5 TABLET ORAL at 09:08

## 2024-06-12 RX ADMIN — MORPHINE SULFATE 2 MG: 2 INJECTION, SOLUTION INTRAMUSCULAR; INTRAVENOUS at 04:26

## 2024-06-12 RX ADMIN — PANTOPRAZOLE SODIUM 40 MG: 40 INJECTION, POWDER, FOR SOLUTION INTRAVENOUS at 08:43

## 2024-06-12 RX ADMIN — INSULIN GLARGINE 7 UNITS: 100 INJECTION, SOLUTION SUBCUTANEOUS at 09:08

## 2024-06-12 RX ADMIN — ATORVASTATIN CALCIUM 80 MG: 40 TABLET, FILM COATED ORAL at 08:43

## 2024-06-12 RX ADMIN — METOPROLOL SUCCINATE 25 MG: 25 TABLET, EXTENDED RELEASE ORAL at 08:43

## 2024-06-12 RX ADMIN — INSULIN LISPRO 4 UNITS: 100 INJECTION, SOLUTION INTRAVENOUS; SUBCUTANEOUS at 12:45

## 2024-06-12 RX ADMIN — INSULIN LISPRO 4 UNITS: 100 INJECTION, SOLUTION INTRAVENOUS; SUBCUTANEOUS at 17:46

## 2024-06-12 RX ADMIN — INSULIN LISPRO 2 UNITS: 100 INJECTION, SOLUTION INTRAVENOUS; SUBCUTANEOUS at 00:44

## 2024-06-12 RX ADMIN — PANTOPRAZOLE SODIUM 40 MG: 40 INJECTION, POWDER, FOR SOLUTION INTRAVENOUS at 17:46

## 2024-06-12 RX ADMIN — Medication 10 ML: at 08:44

## 2024-06-12 RX ADMIN — ONDANSETRON 4 MG: 4 TABLET, ORALLY DISINTEGRATING ORAL at 18:06

## 2024-06-12 RX ADMIN — ACETAMINOPHEN 650 MG: 650 SOLUTION ORAL at 09:08

## 2024-06-12 RX ADMIN — INSULIN LISPRO 2 UNITS: 100 INJECTION, SOLUTION INTRAVENOUS; SUBCUTANEOUS at 05:42

## 2024-06-12 RX ADMIN — OXYCODONE HYDROCHLORIDE 5 MG: 5 TABLET ORAL at 18:06

## 2024-06-12 RX ADMIN — AMLODIPINE BESYLATE 10 MG: 5 TABLET ORAL at 08:43

## 2024-06-12 RX ADMIN — DEXAMETHASONE SODIUM PHOSPHATE 4 MG: 4 INJECTION, SOLUTION INTRAMUSCULAR; INTRAVENOUS at 08:43

## 2024-06-12 RX ADMIN — INSULIN GLARGINE 7 UNITS: 100 INJECTION, SOLUTION SUBCUTANEOUS at 21:16

## 2024-06-12 NOTE — DISCHARGE INSTRUCTIONS
Cervical Fusion    Post-op Guide    Dr. Bang Mirza MD                                                    Surgery:    There are two types of cervical fusion:      1. Anterior Cervical Discectomy and Fusion (ACDF): Dr. Mirza makes a small incision on the front of the neck, and dissects between the trachea (wind pipe), esophagus and major muscles and arteries of the neck until he reaches the anterior (front) of the cervical spine. Then he removes the abnormal disc or discs that are putting pressure on the spinal cord and the spinal nerves. Next, he places a piece of synthetic bone where the disc previously was, and anchors a plate and screws into the bone above and below the disc he removed.  The synthetic bone, plate and screws hold the bones of the neck in place, while your body produces new bone where the disc used to be.  This results in “fusion” of the bones of the neck that previously were  by the  abnormal disc.  Fusion takes your body approximately 12 weeks.       2. Posterior Cervical Fusion: Dr. Mirza makes a vertical incision on the back of neck.  He then separates the muscles from the bones of the posterior (back portion) of the spine. The bone overlying the spinal cord is removed, thus relieving pressure on the spinal cord and spinal nerves.  Next, screws and rods are secured into the bone next to the spinal cord.  This instrumentation (screws and rods) holds the neck in steady position, while the body forms bone over the joints of the neck.  This results in “fusion”. The process of fusion takes your body approximately 12 weeks.       “Fusion” is the process of forming new bone where abnormal discs and spinal joints used to be.  Normal discs and spinal joints allow for a specific range of movement of each bone of the spine.  Arthritis, trauma and other processes destroy the discs and joints, which causes pain as well as compression of the spinal cord and nerves roots.  Fusion  prevents movement between spinal bones, thus eliminating pain and allowing for removal of abnormal discs and joints that compress the spinal cord and nerve roots.    Plates, rods and screws are used to temporarily hold the neck bones rigidly in place, while the body produces new bone.  The new bone creates a permanent “fusion”.  This process takes about 12 weeks, which is why you have activity limitations following your surgery.  These limitations are designed to prevent breakage or failure of the plates, screws and rods, while fusion is taking place.  Once your body produces a solid fusion (which Dr. Mirza can determined based on the CT scan you will have about 12 weeks after surgery), you can return to certain activities without fear (see below).       You will lose some degree of mobility of the neck following fusion.  If you are concerned about this, Dr. Mirza can provide you with information based on your specific surgery.           Post-op  ACTIVITY GUIDE     DAY OF SURGERY   We want you to get up and Move!    Getting out of bed soon after surgery will speed your recovery!    GOAL:  Out of bed a few hours after awaking from surgery for your first walk  You may require assistance from the nurse  A walker for stability may be used depending on your pre-op abilities  Short frequent walks (5min) every 2 hours while in hospital  Engage core when getting in and out of bed   Use smart movement strategies when changing positions  Practice DEEP BREATHING while you walk  3-6 count inhale and exhale  Rely on ORAL pain medications NOT IV       ADVANTAGES:  Prevent blood clots in the legs  Prevents pneumonia through promoting deep breathing  Prevents muscles from stiffening - will decrease pain   You CANNOT walk too much  Oral pain meds have better steady control of pain     POST-OP DAY #1   Diet / Activity / Pain Control / GO HOME    Assessments by Physical Therapy and Occupational Therapy (OT)    GOAL  Review proper neck  mechanics/restrictions  Walking up and down stairs is GOOD as long as you do it safely  PT / OT will assess when you are safe to go home  Activities of Daily Living - okay to shower, dress, navigate around house  Surgical Incision needs to be covered in the shower unless advised otherwise by Dr. Mirza  No baths, hot tubs, swimming pools for 6 weeks or until incision closed without scab.   Go home !            Criteria for Discharge Home:  Cleared by PT / OT   Cleared by the Medicine Service  Adequate pain control with or without medications  Tolerating food and Liquids  Ability to urinate  Having a bowel movement IS NOT a discharge requirement unless there is a medical issue as per Medicine Service  Depends on pain control, support at home, mobility    Occasionally, some patients with extra care needs may require more days in the hospital or a short stay at a local rehabilitation facility (e.g. patients with minimal home social support, additional medical needs). Hospital based  will assist with rehab placement.     ACTIVITY GUIDELINES    Be Careful with BLTs for 3 months !    Bending (Lateral bending, Flexing or Extending the neck)  Stay within a limited range of motion - Do NOT bend chin to chest or extend neck to look straight up  Move the head and eyes NOT the neck to look up and down  Place or hold computers, tablets and cell phones close to eye level  Surgical hardware won't break from over flexing or extending neck once or twice  Repetitive over flexion or extension can loosen hardware over time      Lifting  General weight limit for first 12 weeks is a maximum of 30 lbs   Anything that causes “strain” should not be lifted  Coughing, sneezing, vomiting, or straining with bowel movements will not damage your surgical hardware  Lap top, gallon of milk, purse, infant children okay  Luggage, large backpack, heavy grocery bag, furniture - not okay  Lift things close to body - limit reaching to pick  things up    Turning  Turn hips, shoulders, head and neck as one unit  Do NOT turn head without also turning shoulders  Avoid reaching across the body and avoid overhead movements           Driving    - Okay to consider driving during first 2 weeks after surgery   - MUST meet following requirements    -You must be OFF narcotics and not under their influence     - You must be a SAFE  yourself.     - Patients may be considered to be UNSAFE if they are:     -Distracted by their pain     -Unable to sit comfortably for the required length of the      journey                         -Unable to use mirrors safely because of restrictions or              surgical pain      ACTIVITY - FIRST 2 WEEKS:    Low Impact Aerobic Exercise  5-30min 2 times per day EVERY DAY  Walking, elliptical, stairs and/or recumbent bike  Slow safe pace, okay to do intervals (walk 4 min rest 1 min x 3-5 sets)  No hiking, speed walking or carrying baggage.   FOCUS ON POSTURE, DEEP BREATHING (6 count) AND CORE ACTIVATION    Physical Therapy  Will start after your first post-operative office visit (2 weeks)  It's Important, So, YES it's Mandatory  2 times per week x 12 weeks  We can recommend the Physical Therapist near your home  They should help guide your core exercise program  Home exercises and low impact aerobic work should be done 4-5x per week in addition to PT       RETURNING TO WORK     The timescale for Return to Work will vary from patient-to-patient and depends mainly on the nature of your specific surgery and the type of work / activity you wish to re-commence.     General Guidelines:    Can work from home if needed within the first week or so after surgery  Do not work for longer than 30-45 minutes at a time without a break (standing and walking around)    Sedentary jobs (desk work etc)   Typically within 1-6 weeks  depends on particulars of job  driving distance   stress, etc  light duty  <15lbs weight limit, Minimal BLT -  okay  If you have the option, sometimes returning to work alternate days (i.e Mon-Wed-Fri) for 1-2 weeks assists with the transition back to work.     Manual Labor  3-6 months - varies per case  depends on intensity and weight limit  Performance in PT program can help determine      RECREATIONAL SPORTS & ACTIVITIES     After 2 weeks  swimming, walking, recumbent bike, stair master    After 6 weeks   hiking (no Pack, light grade)    After 3 months   Must be cleared by Dr. Mirza and PT  Biking, jogging, resistance training, climbing, Pilates, sports specific drills, body weight interval training, golf, tennis  Start slow and build up slowly   Do NOT go back FULL SPEED right away     After 6 months  Skiing, horseback riding, ATV riding, snow mobile etc      Sports - Non-Contact  Minimum 18 weeks  Must have completed aerobic and resistance training   Must have successfully done sports specific drill and been asymptomatic    Sports - Contact   Varies from sport to sport  Minimum 3 Months  Must exhibit signs of fusion on CT  Must have completed non-contact  sports specific drills without any symptoms      Lifelong recommendations:  Warm up before EVERY activity 5-10 minutes using Recumbent bike, Stair Master, elliptical , speed walk  Core exercises:   3 -5 x /week - forever!  10 minutes  Should follow warm up prior to activity / sport  Always know your core level

## 2024-06-12 NOTE — PROGRESS NOTES
"Nutrition Services    Patient Name: Gurinder Amezcua  YOB: 1950  MRN: [id number]  Admission date: 6/6/2024    Will advance by 15 mL/hr q 4 hours to goal rate of Diabetisource AC @ 80 mL/hr with 30 mL/hr FWF.     PROGRESS NOTE      Encounter Information: Checking in on pt to monitor Nutrition POC and TF tolerance.  Pt had PEG placed yesterday and EN started last night at initial rate to establish tolerance. Pt remains NPO and appears to be tolerating TF without noted issues at this time per secure chat message with nurse.  Will begin advancing towards goal rate today as tolerated.        PO Diet: NPO Diet NPO Type: Tube Feeding   PO Supplements: none   PO Intake:  NPO       Current nutrition support: Diabetisource AC at 30mL/hr + 20mL/hr water flush    Nutrition support review: Infusing as documented above per EMR.        Labs (reviewed below): Reviewed. Management per attending.         GI Function:  Last documented BM 6/5 (prior to admit) No BM documented since admit. PRN bowel meds available. RD suggested pt receive bowel meds yesterday in secure message to nurse.        Nutrition Intervention Updates: Will begin to advance TF today towards goal rate as tolerated.    Will advance by 15 mL/hr q 4 hours to goal rate of Diabetisource AC @ 80 mL/hr with 30 mL/hr FWF.     Will continue to monitor per protocol.        Results from last 7 days   Lab Units 06/10/24  2313 06/10/24  0021 06/07/24  0006   SODIUM mmol/L 139 137 135*   POTASSIUM mmol/L 3.7 4.1 4.6   CHLORIDE mmol/L 107 106 102   CO2 mmol/L 24.7 22.4 21.5*   BUN mg/dL 23 23 16   CREATININE mg/dL 0.73* 0.69* 0.83   CALCIUM mg/dL 8.8 8.7 9.1   BILIRUBIN mg/dL 0.8  --   --    ALK PHOS U/L 80  --   --    ALT (SGPT) U/L 17  --   --    AST (SGOT) U/L 15  --   --    GLUCOSE mg/dL 96 184* 282*     Results from last 7 days   Lab Units 06/10/24  2313   HEMOGLOBIN g/dL 14.3   HEMATOCRIT % 43.6     No results found for: \"COVID19\"  Lab Results   Component " Value Date    HGBA1C 8.80 (H) 05/28/2024         RD to follow up per protocol.    Electronically signed by:  Haleigh Muñoz RD  06/12/24 06:41 EDT

## 2024-06-12 NOTE — DISCHARGE INSTR - ACTIVITY
No bending or twisting at the waist.  No rapid bending or twisting of the neck.  No lifting greater than 5 pounds.  No driving for 1 week.  Do not soak or submerge incision underwater for 6 weeks.

## 2024-06-12 NOTE — PROGRESS NOTES
Neurosurgery Progress Note    Date: 24     Patient: Gurinder Amezcua   Sex: male   : 1950      SUBJECTIVE    CC: Post-operative day 6 for cervical 3-5 anterior cervical discectomy and fusion    Interval history:  Patient has no complaints this morning. Patient had a peg tube placed yesterday since he can only have water and ice chips PO at this time.     Current Medications:  Scheduled Meds:amLODIPine, 10 mg, Per PEG Tube, Q24H  atorvastatin, 80 mg, Per PEG Tube, Daily  Barium Sulfate, 1 teaspoon(s), Oral, Once in imaging  dexAMETHasone, 2 mg, Intravenous, Q12H  insulin glargine, 7 Units, Subcutaneous, Q12H  insulin lispro, 2-9 Units, Subcutaneous, Q6H  metoprolol succinate XL, 25 mg, Oral, Daily  pantoprazole, 40 mg, Intravenous, BID AC  sodium chloride, 10 mL, Intravenous, Q12H      Continuous Infusions:Pharmacy Consult - Steroid Insulin Protocol,   sodium chloride, 100 mL/hr, Last Rate: 100 mL/hr (24 0833)      PRN Meds:   acetaminophen **OR** acetaminophen **OR** acetaminophen    senna-docusate sodium **AND** polyethylene glycol **AND** bisacodyl **AND** bisacodyl    cyclobenzaprine    dextrose    dextrose    glucagon (human recombinant)    hydrALAZINE    ipratropium-albuterol    melatonin    Morphine    [DISCONTINUED] HYDROmorphone **AND** naloxone    ondansetron ODT **OR** ondansetron    oxyCODONE    Pharmacy Consult - Steroid Insulin Protocol    sodium chloride    sodium chloride      OBJECTIVE  Vitals:    24 0009 24 0417 24 0550 24 0843   BP: 127/72 100/53  146/73   BP Location: Left arm Left arm     Patient Position: Sitting Lying     Pulse: 73 65  88   Resp: 20 20     Temp: 97.2 °F (36.2 °C) 97.9 °F (36.6 °C)     TempSrc: Oral Oral     SpO2: 93% 93%     Weight:   99.6 kg (219 lb 9.3 oz)    Height:           Physical exam    General  - WD/WN male, appears their stated age  - Awake, cooperative, in no acute distress  HEENT  - Normocephalic, atraumatic  - PERRLA, EOM  intact  Respiratory  - Normal respiratory rate and effort  Musculoskeletal  - No joint redness, swelling, or tenderness  Skin  - Warm and dry, no bleeding, bruising, or rash. Surgical incision along the anterior aspect of his neck with dressing. Peg tube placed yesterday.   NEUROLOGIC  - A/O x3, GCS 4-6-5  - CN II-XII grossly intact  - Moves all extremities symmetrically and w/ 5/5 strength  - Sensation intact throughout  - Negative pronator drift  - Normal finger-nose coordination      Results review  CBC          6/6/2024    08:37 6/6/2024    09:49 6/6/2024    15:57 6/7/2024    00:06 6/10/2024    00:21 6/10/2024    23:13   CBC   WBC    6.27  6.28  6.32    RBC    4.54  4.62  4.84    Hemoglobin 11.9  11.9  15.0  13.5  13.9  14.3    Hematocrit 35  35  44  40.6  41.7  43.6    MCV    89.4  90.3  90.1    MCH    29.7  30.1  29.5    MCHC    33.3  33.3  32.8    RDW    13.2  13.3  13.3    Platelets    134  124  111        BMP          5/28/2024    06:40 6/7/2024    00:06 6/10/2024    00:21 6/10/2024    23:13   BMP   BUN 19  16  23  23    Creatinine 0.94  0.83  0.69  0.73    Sodium 142  135  137  139    Potassium 3.7  4.6  4.1  3.7    Chloride 105  102  106  107    CO2 26.0  21.5  22.4  24.7    Calcium 9.2  9.1  8.7  8.8          XR Spine Cervical 2 or 3 View    Result Date: 6/11/2024  XR SPINE CERVICAL 2 OR 3 VW Date of Exam: 6/7/2024 7:55 AM EDT Indication: s/p ACDF Comparison: 7/8/2023 Findings: Interval ACDF at C3/4 and C4/5. Old ACDF at C5/6. There is obscuration of C7 due to patient positioning. Alignment appears normal. Hardware appears intact. There is mild to moderate mid cervical facet arthropathy.     Impression: Impression: 1.Interval postoperative changes without acute process identified. Please note that C7 is obscured. Electronically Signed: Chuck Rosales MD  6/11/2024 9:00 AM EDT  Workstation ID: [id number]    XR Spine Cervical 2 or 3 View    Result Date: 6/6/2024  This procedure was auto-finalized with no  dictation required.                   ASSESSMENT/PLAN  This is a 74 y.o. male who underwent surgery and is having post-operative dysphagia. The patient continues to have difficulty swallowing, but states it has slightly improved. Since he is only able to have water and ice chips he had a peg tube placed to have sufficient nutrition. He is to keep his peg tube for 6-8 weeks. Patient is ok to discharge from both a neurosurgical standpoint and speech. Patient is neurologically intact at this time and has no new complaints.I will wait for speech to see him today before discharging.     Plan:  Cervical 3-5 anterior cervical discectomy and fusion  - Continue pain management  - Continue bowel regimen   - Continue working with PT/OT    2. Dysphagia  - Continue working with speech  - Continue water/ice chips PO  - Peg tube for 6-8 weeks         I discussed my assessment and recommendations with Dr. Yuki Wilkinson PA-C  06/12/24  10:14 EDT      Part of this note may be an electronic transcription/translation of spoken language to printed text using the Dragon Dictation System.

## 2024-06-12 NOTE — THERAPY TREATMENT NOTE
Acute Care - Speech Language Pathology Treatment Note    CHRISTINA Fitzgerald     Patient Name: Gurinder Amezcua  : 1950  MRN: [id number]    Today's Date: 2024                   Admit Date: 2024       Visit Dx:      ICD-10-CM ICD-9-CM   1. Feeding difficulties  R63.30 783.3   2. Cervical myelopathy  G95.9 721.1   3. Dysphagia, unspecified type  R13.10 787.20       Patient Active Problem List   Diagnosis    Type 2 diabetes mellitus with diabetic polyneuropathy, with long-term current use of insulin    Dyslipidemia    Acute deep vein thrombosis (DVT) of femoral vein of right lower extremity    Arthritis    Esophageal obstruction    Other esophagitis without bleeding    Cerebral infarction, unspecified    Chronic back pain    Chronic fatigue    Degenerative disc disease, lumbar    Diaphragmatic hernia without obstruction or gangrene    Dysphagia    Encounter for screening colonoscopy    Lumbar disc disease with radiculopathy    Lower leg DVT (deep venous thromboembolism), chronic, right    History of iron deficiency    History of DVT (deep vein thrombosis)    Herniated nucleus pulposus of lumbosacral region    Hearing loss    Gastro-esophageal reflux disease without esophagitis    Gastroesophageal reflux disease without esophagitis    Gastric foreign body    Ureteral stone with hydronephrosis    Trigeminal neuralgia    Recurrent kidney stones    Nephrolithiasis    Polyp of stomach and duodenum    Mixed hyperlipidemia    Major depressive disorder, single episode, unspecified    Lumbosacral spondylosis without myelopathy    Maxillary pain    Vitamin D deficiency    Type 2 diabetes mellitus with hyperglycemia    Coronary artery disease of native artery of native heart with stable angina pectoris    Abnormal nuclear stress test    Cervical myelopathy    S/P cervical spinal fusion    Feeding difficulties       Past Medical History:   Diagnosis Date    Ankylosing spondylitis of site in spine     Spine surgery     Anxiety     Carotid artery occlusion     Cataracts, bilateral 04/2022    Cervical disc disorder     Chronic pain disorder     Coronary artery disease of native artery of native heart with stable angina pectoris 12/28/2023    Deep vein thrombosis 2018 approx    none since    Depression 6/19    Death of wife    Diabetes mellitus 2017 approx    Type 2    Headache Always    Heart attack 06/17/2021    Low back pain     Lumbosacral disc disease     Migraine Unknown    Mixed hyperlipidemia 03/08/2018    Neck pain Feb 2023    Peripheral neuropathy     Rheumatoid arthritis Unknown    Shingles     Spinal stenosis     Thoracic disc disorder     Trigeminal neuralgia     Ureteral stone with hydronephrosis 11/24/2016       Past Surgical History:   Procedure Laterality Date    ANTERIOR CERVICAL DISCECTOMY W/ FUSION N/A 6/6/2024    Procedure: Cervical 3-5 anterior cervical discectomy and fusion;  Surgeon: Bang Mirza IV, MD;  Location: [facility] MAIN OR;  Service: Neurosurgery;  Laterality: N/A;    BACK SURGERY  2019    4 rods in back    BRAIN SURGERY      CARDIAC CATHETERIZATION  06/17/2021    CARDIAC CATHETERIZATION N/A 01/02/2024    Procedure: Left Heart Cath with angiogram;  Surgeon: Akin Brown MD;  Location: [facility] CATH INVASIVE LOCATION;  Service: Cardiovascular;  Laterality: N/A;    CARDIAC CATHETERIZATION N/A 01/02/2024    Procedure: Left ventriculography;  Surgeon: Akin Brown MD;  Location: [facility] CATH INVASIVE LOCATION;  Service: Cardiovascular;  Laterality: N/A;    CAROTID STENT      CORONARY STENT PLACEMENT  06/17/2021    CRANIOTOMY      CYBERKNIFE      ENDOSCOPY      EPIDURAL BLOCK      HERNIA REPAIR      KNEE ARTHROSCOPY      LAMINECTOMY      NECK SURGERY  Unknown    SPINAL CORD STIMULATOR IMPLANT  Previously    SPINAL FUSION  Unknown    SPINE SURGERY      TRIGEMINAL NERVE DECOMPRESSION      TRIGGER POINT INJECTION       Skilled ST intervention conducted this date targeting dysphagia in the setting of recent C 3  "-5 ACDF procedure 6/6/2024 with new onset of difficulty swallowing since this procedure .  Pt alert, pleasant, and amenable to treatment.   Pt on room air during session. Pt currently prescribed a Other/NPO with PEG tube in place for nutrition     Treatment narrative/results: The patient was seen bedside today for continued swallow therapy and patient education. Upon entrance to the patient's room he was sitting up in a chair, awake, alert and responsive. He is currently on room air and has PEG tube feedings in place. Per chart review and patient/staff discussion he is to be discharged to a SNF for rehab/therapy. ST reviewed results of recent instrumental assessment of swallow and need for extensive dysphagia rehab. He stated he did not want to do \"therapy\" until he sees his neurosurgeon in about 10 days. Discussed need to continue ice chips/water sips to continue to engage swallow muscles/mechanism, rationale for the [place] protocol and plan for swallow therapy/exercises including effortful swallow. He repeatedly stated \"I can't swallow\" however was able to elicit dry swallows and swallow his own saliva today. Discussed repeat instrumental assessment (which can be completed as an outpatient) in approximately 4-6 weeks or when deemed appropriate clinically by his new SLP and/or current MD. Information provided and secure chat completed with PA.      SLP Recommendation and Plan  It is recommended that he remain NPO with the exception of ice chips/water sips as per the [place] [employer] Protocol for now. Continue alternative means of nutrition. He will require extensive dysphagia rehab and repeat instrumental assessment of swallow in approximately 4-6 weeks or as indicated clinically. Lengthy discussion with the patient and nursing staff, as well as secure chat w/PA.  will continue to follow while in-house for swallow therapy, patient/caregiver education, and additional goals/recommendations as indicated.    "   The rationale to recommend water/ice chips when a PO diet cannot appropriately/functionally sustain nutrition is because water is low risk for aspiration Pneumonia when compared to aspiration of food or other liquids.       Benefits of a water protocol include but are not limited to:      Oral gratification   Engagement of oropharyngeal swallow musculature   Decrease likelihood of dehydration       Guidelines for proper implementation include:  Thorough oral care prior to consuming water  Upright at 90 degree hip flexion  Small sips at slow rate     Monitor for any changes in respiratory status and discontinue if distress noted     The [place] [employer] Water Protocol is a research based protocol established in 1984.        EDUCATION    The patient has been educated in the following areas:       Dysphagia (Swallowing Impairment) Oral Care/Hydration NPO rationale.             SLP GOALS       Row Name 06/12/24 1000       (LTG) Swallow    (LTG) Swallow Patient will tolerate safest and least restrictive diet without complications from aspiration.  -SM    [place] (Swallow Long Term Goal) independently (over 90% accuracy)  -SM    Time Frame (Swallow Long Term Goal) by discharge  -SM    Progress/Outcomes (Swallow Long Term Goal) goal ongoing;progress slower than expected  -SM    Comment (Swallow Long Term Goal) See above  -SM       (STG) Patient will tolerate trials of    Consistencies Trialed (Tolerate trials) thin liquids  -SM    Desired Outcome (Tolerate trials) without signs/symptoms of aspiration;without signs of distress;for pleasure/comfort;with use of compensatory strategies (see comments)  -SM    [place] (Tolerate trials) independently (over 90% accuracy)  -SM    Time Frame (Tolerate trials) 1 week  -SM    Progress/Outcomes (Tolerate trials) goal ongoing  -SM    Comment (Tolerate trials) See above  -SM       (STG) Swallow 1    (STG) Swallow 1 Patient will participate in ongoing reevaluation clinically and  including instrumental assessment if indicated in anticipation of initiation of  PO diet.  -SM    [place] (Swallow Short Term Goal 1) with moderate cues (50-74% accuracy)  -SM    Time Frame (Swallow Short Term Goal 1) by discharge  -SM    Progress/Outcomes (Swallow Short Term Goal 1) continuing progress toward goal  -SM    Comment (Swallow Short Term Goal 1) See above.  -SM       (STG) Swallow 2    (STG) Swallow 2 Patient/caregiver teaching  -SM    [place] (Swallow Short Term Goal 2) with maximum cues (25-49% accuracy)  -SM    Time Frame (Swallow Short Term Goal 2) by discharge  -SM    Progress/Outcomes (Swallow Short Term Goal 2) goal ongoing  -SM    Comment (Swallow Short Term Goal 2) See above  -SM       (STG) Pharyngeal Strengthening Exercise Goal 1 (SLP)    Activity (Pharyngeal Strengthening Goal 1, SLP) increase epiglottic inversion and retroflexion;increase closure at entrance to airway/closure of airway at glottis  -SM    Increase Epiglottic Inversion and Retroflexion hard effortful swallow  -SM    Increase Closure at Entrance to Airway/Closure of Airway at Glottis chin tuck against resistance (CTAR);super-supraglottic swallow;hard effortful swallow  -SM    [place]/Accuracy (Pharyngeal Strengthening Goal 1, SLP) with maximum cues (25-49% accuracy)  -SM    Time Frame (Pharyngeal Strengthening Goal 1, SLP) short term goal (STG)  -SM    Progress/Outcomes (Pharyngeal Strengthening Goal 1, SLP) goal ongoing  -SM    Comment (Pharyngeal Strengthening Goal 1, SLP) See above  -SM              User Key  (r) = Recorded By, (t) = Taken By, (c) = Cosigned By      Initials Name Provider Type    Mary Ann Wayne, SLP Speech and Language Pathologist    Jovanna Mckinley, SLP Speech and Language Pathologist                                Time Calculation:                                  RONEY Sparks  6/12/2024

## 2024-06-12 NOTE — DISCHARGE SUMMARY
Discharge Summary    Patient: Gurinder Amezcua  : 1950    Patient Care Team:  Marissa Prasad MD as PCP - General (Family Medicine)  Akin Brown MD as Consulting Physician (Cardiology)    Date of Admit: 2024    Date of Discharge:  2024    Discharge Diagnosis:  Cervical myelopathy    Dysphagia    S/P cervical spinal fusion    Feeding difficulties      Procedures Performed  Procedure(s):  ESOPHAGOGASTRODUODENOSCOPY WITH PERCUTANEOUS ENDOSCOPIC GASTROSTOMY TUBE INSERTION   1128 Upper GI Endoscopy    Complications: Post-operative dysphagia     Consultants:   Consults       Date and Time Order Name Status Description    6/10/2024 12:05 PM Inpatient Gastroenterology Consult Completed     2024  2:14 PM Inpatient Hospitalist Consult Completed             Condition on Discharge: stable    Discharge disposition: home    HPI: Gurinder Amezcua is a 74 y.o. male who presented with signs and symptoms of progressive radiculopathy and cervical myelopathy. Patient failed all conservative measures. Patient was aware of potential risks of surgery and was agreeable.     Hospital Course: Patient admitted for above procedure. The patient was transferred to SIPS/ICU following recovery. Patient had difficulty swallowing after surgery and was evaluated by speech pathology. Speech found the patient to have quite a bit of inflammation and had the patient have only water and ice chips PO. Patient had another swallow test, which he also failed. In order to ensure the patient had sufficient nutrition he had a peg tube placed. Patient is to keep the peg tube in place for 6-8 weeks and is to follow up with speech for more testing. Patient has no other complaints.     Vitals:    24 0843   BP: 146/73   Pulse: 88   Resp:    Temp:    SpO2:          Lab Results (last 24 hours)       Procedure Component Value Units Date/Time    POC Glucose Once [[id number]]  (Abnormal) Collected: 24    Specimen: Blood  Updated: 06/12/24 0526     Glucose 173 mg/dL      Comment: Serial Number: [id number]:  [id number]       POC Glucose Q6H [[id number]]  (Abnormal) Collected: 06/12/24 0014    Specimen: Blood Updated: 06/12/24 0015     Glucose 190 mg/dL      Comment: Serial Number: [id number]:  [id number]       POC Glucose Q6H [[id number]]  (Abnormal) Collected: 06/11/24 1805    Specimen: Blood Updated: 06/11/24 1806     Glucose 160 mg/dL      Comment: Serial Number: [id number]:  [id number]       POC Glucose Q6H [[id number]]  (Abnormal) Collected: 06/11/24 1255    Specimen: Blood Updated: 06/11/24 1258     Glucose 112 mg/dL      Comment: Serial Number: [id number]:  [id number]               XR Spine Cervical 2 or 3 View    Result Date: 6/11/2024  XR SPINE CERVICAL 2 OR 3 VW Date of Exam: 6/7/2024 7:55 AM EDT Indication: s/p ACDF Comparison: 7/8/2023 Findings: Interval ACDF at C3/4 and C4/5. Old ACDF at C5/6. There is obscuration of C7 due to patient positioning. Alignment appears normal. Hardware appears intact. There is mild to moderate mid cervical facet arthropathy.     Impression: Impression: 1.Interval postoperative changes without acute process identified. Please note that C7 is obscured. Electronically Signed: Chuck Rosales MD  6/11/2024 9:00 AM EDT  Workstation ID: [id number]    XR Spine Cervical 2 or 3 View    Result Date: 6/6/2024  This procedure was auto-finalized with no dictation required.                   Discharge Physical Exam:    General  - WD/WN male, appears their stated age, awake, cooperative, in no acute distress  HEENT  - Normocephalic, atraumatic, PERRLA, EOM intact. Dysphagia   Respiratory  - Normal respiratory rate and effort  Abdomen  - Flat, soft, NT/ND  Musculoskeletal  - Moves all extremities well, no joint swelling/tenderness  Skin  - Surgical incision well approximated, clean and dry, no swelling, redness, or drainage  NEUROLOGIC  - A/O x3  - CN II-XII grossly intact  - Moves all  extremities symmetrically and with good strength  - Sensation intact throughout      Discharge Medications  Inspect has been reviewed and narcotic consent is on file in the patient's chart.     Your medication list        START taking these medications        Instructions Last Dose Given Next Dose Due   naloxone 0.4 MG/ML injection  Commonly known as: NARCAN      Infuse 1 mL into a venous catheter Every 5 (Five) Minutes As Needed for Respiratory Depression.              CHANGE how you take these medications        Instructions Last Dose Given Next Dose Due   metFORMIN  MG 24 hr tablet  Commonly known as: GLUCOPHAGE-XR  What changed: Another medication with the same name was removed. Continue taking this medication, and follow the directions you see here.      Take 2 tablets by mouth Daily With Breakfast.       Toujeo SoloStar 300 UNIT/ML solution pen-injector injection  Generic drug: Insulin Glargine (1 Unit Dial)  What changed:   how much to take  additional instructions      Inject 30 Units under the skin into the appropriate area as directed Daily. Inject 25 units QHS              CONTINUE taking these medications        Instructions Last Dose Given Next Dose Due   aspirin 81 MG EC tablet      Take 1 tablet by mouth Daily.       atorvastatin 80 MG tablet  Commonly known as: LIPITOR      Take 1 tablet by mouth Daily.       B-D UF III MINI PEN NEEDLES 31G X 5 MM misc  Generic drug: Insulin Pen Needle      TEST EVERY DAY       freestyle lancets      FreeStyle Lancets       Jardiance 10 MG tablet tablet  Generic drug: empagliflozin           Lyumjev KwikPen 100 UNIT/ML solution pen-injector  Generic drug: Insulin Lispro-aabc (1 U Dial)      Inject 12 Units under the skin into the appropriate area as directed Every Morning.       metoprolol succinate XL 25 MG 24 hr tablet  Commonly known as: TOPROL-XL      Take 1 tablet by mouth Daily.       pantoprazole 40 MG EC tablet  Commonly known as: PROTONIX      Take 1  tablet by mouth Daily.                 Where to Get Your Medications        Information about where to get these medications is not yet available    Ask your nurse or doctor about these medications  aspirin 81 MG EC tablet  naloxone 0.4 MG/ML injection         Discharge Diet: Ice chips and water, advance as tolerated. PEG tube in place.       Activity at Discharge: No bending or twisting at the waist.  No rapid bending or twisting of the neck.  No lifting greater than 5 pounds.  No driving for 1 week.  Do not soak or submerge incision underwater for 6 weeks.      Call for: questions or concerns    Follow-up Appointments  Future Appointments   Date Time Provider Department Center   6/24/2024  9:30 AM Issa Justin PA [facility]   9/17/2024  1:40 PM Akin Brown MD [facility]   9/19/2024  8:00 AM Kiarra Verde APRN [facility]      Follow-up Information       Marissa Prasad MD .    Specialty: Family Medicine  Contact information:  [address] [place] [address] [place] [address] [phone]                               I discussed the discharge instructions with patient    Erum Wilkinson PA-C  06/12/24  10:53 EDT            Part of this note may be an electronic transcription/translation of spoken language to printed text using the Dragon Dictation System.

## 2024-06-12 NOTE — PLAN OF CARE
"Assessment: Gurinder Amezcua presents with functional mobility impairments which indicate the need for skilled intervention. Tolerating session today without incident. Pt will need SNF placement at discharge for peg tube care needs/education, and more intensive speech therapy for dysphagia deficits. Will continue to follow and progress as tolerated.     Plan/Recommendations:   If medically appropriate, Low Intensity Therapy recommended post-acute care - This is recommended as therapy feels this patient would require 2-3 visits per week. OP or HH would be the best option depending on patient's home bound status. Consider, if the patient has other  \"skilled\" needs such as wounds, IV antibiotics, etc. Combined with \"low intensity\" could also equate to a SNF. If patient is medically complex, consider LTAC. Pt requires no DME at discharge.     Pt desires Skilled Rehab placement at discharge. Pt cooperative; agreeable to therapeutic recommendations and plan of care.           "

## 2024-06-12 NOTE — PLAN OF CARE
Goal Outcome Evaluation:      Pt alert and oriented. Pt complaints of pain managed with IV medication. Pt receiving tube feedings. VS stable. Plan of care ongoing.

## 2024-06-12 NOTE — CASE MANAGEMENT/SOCIAL WORK
Continued Stay Note  CHRISTINA Fitzgerald     Patient Name: Gurinder Amezcua  MRN: [id number]  Today's Date: 6/12/2024    Admit Date: 6/6/2024  Plan: [facility] accepted, no pre-cert required, PASRR approved.   Discharge Plan       Row Name 06/12/24 1536       Plan    Plan [facility] accepted, no pre-cert required, PASRR approved.    Patient/Family in Agreement with Plan yes    Plan Comments CM confirmed with liaison Clarissa patient accepted at [facility]. Patient notified. Patient states his son will provide transportation.               Expected Discharge Date and Time       Expected Discharge Date Expected Discharge Time    Jun 12, 2024      Becka Shipley RN      Office Phone [phone]

## 2024-06-12 NOTE — CASE MANAGEMENT/SOCIAL WORK
Social Work Assessment  [facility]     Patient Name: Gurinder Amezcua  MRN: [id number]  Today's Date: 6/12/2024    Admit Date: 6/6/2024     Discharge Plan       Row Name 06/12/24 1651       Plan    Plan Comments LSW received call from liaison (Clarissa) after-hours stating they couldn't get tube feeds delivered this evening and requested we send with 24 hours worth. Notified floor RN who affirmed they could. Sent number for nurse's station to liaison to have the facility nurse call floor RN with any questions and to confirm appropriate items (tubes/pump), etc.                  AMANDA Sims, SALONIW, [facility]    Phone: [phone]  Cell: [phone]  Fax: [phone]  [email or web address]

## 2024-06-12 NOTE — CONSULTS
Initial spiritual consult visit. Pt in room alone watching TV. He declined spiritual care.  offered to return at any time if a need should arise. He appreciated the offer. No other needs at this time.

## 2024-06-12 NOTE — CASE MANAGEMENT/SOCIAL WORK
Continued Stay Note  CHRISTINA Fitzgerald     Patient Name: Gurinder Amezcua  MRN: [id number]  Today's Date: 6/12/2024    Admit Date: 6/6/2024  Plan: [facility] referral (pending), PASRR approved, no pre-cert required.   Discharge Plan       Row Name 06/12/24 1025       Plan    Plan [facility] referral (pending), PASRR approved, no pre-cert required.    Patient/Family in Agreement with Plan yes    Provided Post Acute Provider List? Yes    Post Acute Provider List Nursing Home    Delivered To Patient    Method of Delivery In person    Plan Comments CM met with patient at bedside to discuss SNF choices. Patient would like to stay close to home. Patient choice 1. [facility]. Liaison Maxi notified of new referral and placed in [facility] per liaison request. Pharmacy updated to facility pharmacy [place], [place]             Expected Discharge Date and Time       Expected Discharge Date Expected Discharge Time    Jun 13, 2024          Becka Shipley RN      Office Phone [phone]

## 2024-06-12 NOTE — DISCHARGE PLACEMENT REQUEST
"Tia Amezcua W \"Abimael\" (74 y.o. Male)       Date of Birth   1950    Social Security Number       Address   [address] [place] [address] [place] [facility]    Home Phone   [phone]    MRN   [id number]       Jainism   Muslim    Marital Status                               Admission Date   6/6/24    Admission Type   Elective    Admitting Provider   Bang Mirza IV, MD    Attending Provider   Bang Mirza IV, MD    Department, Room/Bed   [facility] SURGICAL INPATIENT, 4118/1       Discharge Date       Discharge Disposition       Discharge Destination                                 Attending Provider: Bang Mirza IV, MD    Allergies: Adhesive Tape, Latex, Methocarbamol, Pregabalin, Silver    Isolation: None   Infection: None   Code Status: CPR    Ht: 195.6 cm (77\")   Wt: 99.6 kg (219 lb 9.3 oz)    Admission Cmt: None   Principal Problem: Cervical myelopathy [G95.9]                   Active Insurance as of 6/6/2024       Primary Coverage       Payor Plan Insurance Group Employer/Plan Group    MEDICARE MEDICARE A & B        Payor Plan Address Payor Plan Phone Number Payor Plan Fax Number Effective Dates    [address] [phone]  3/1/2016 - None Entered    [facility] [id number]         Subscriber Name Subscriber Birth Date Member ID       TIA AMEZCUA 1950 [id number]               Secondary Coverage       Payor Plan Insurance Group Employer/Plan Group    AARP MC SUP AAR HEALTH CARE OPTIONS PLAN G       Payor Plan Address Payor Plan Phone Number Payor Plan Fax Number Effective Dates    [facility] [phone]  1/1/2022 - None Entered    [address]       [facility] [address]         Subscriber Name Subscriber Birth Date Member ID       TIA AMEZCUA W 1950 [id number]                     Emergency Contacts        (Rel.) Home Phone Work Phone Mobile Phone    LMKIRILL PRINCE (Son) -- -- [phone]    lane Amezcua -- -- [phone]                "

## 2024-06-12 NOTE — PROGRESS NOTES
[facility] MEDICINE SERVICE  DAILY PROGRESS NOTE    NAME: Gurinder Amezcua  : 1950  MRN: [id number]      LOS: 4 days     PROVIDER OF SERVICE: Alfredo Jiménez MD    Chief Complaint: Cervical myelopathy    Subjective:     Interval History:  History taken from: patient chart RN  Tolerating TF   Pain controlled     Review of Systems:   Review of Systems  All negative except as above  Objective:     Vital Signs  Temp:  [97.2 °F (36.2 °C)-97.9 °F (36.6 °C)] 97.3 °F (36.3 °C)  Heart Rate:  [65-88] 77  Resp:  [15-20] 15  BP: (100-146)/(53-73) 108/68   Body mass index is 26.04 kg/m².    Physical Exam  Physical Exam  AOx3 NAD  RRR S1-S2 audible  Lungs CTA  Abdomen soft nontender nondistended  Scheduled Meds   amLODIPine, 10 mg, Per PEG Tube, Q24H  atorvastatin, 80 mg, Per PEG Tube, Daily  Barium Sulfate, 1 teaspoon(s), Oral, Once in imaging  dexAMETHasone, 2 mg, Intravenous, Q12H  insulin glargine, 7 Units, Subcutaneous, Q12H  insulin lispro, 2-9 Units, Subcutaneous, Q6H  metoprolol succinate XL, 25 mg, Oral, Daily  pantoprazole, 40 mg, Intravenous, BID AC  sodium chloride, 10 mL, Intravenous, Q12H       PRN Meds     acetaminophen **OR** acetaminophen **OR** acetaminophen    senna-docusate sodium **AND** polyethylene glycol **AND** bisacodyl **AND** bisacodyl    cyclobenzaprine    dextrose    dextrose    glucagon (human recombinant)    hydrALAZINE    ipratropium-albuterol    melatonin    Morphine    [DISCONTINUED] HYDROmorphone **AND** naloxone    ondansetron ODT **OR** ondansetron    oxyCODONE    Pharmacy Consult - Steroid Insulin Protocol    sodium chloride    sodium chloride   Infusions  Pharmacy Consult - Steroid Insulin Protocol,   sodium chloride, 100 mL/hr, Last Rate: 100 mL/hr (24 0833)          Diagnostic Data    Results from last 7 days   Lab Units 06/10/24  2313   WBC 10*3/mm3 6.32   HEMOGLOBIN g/dL 14.3   HEMATOCRIT % 43.6   PLATELETS 10*3/mm3 111*   GLUCOSE mg/dL 96   CREATININE mg/dL  0.73*   BUN mg/dL 23   SODIUM mmol/L 139   POTASSIUM mmol/L 3.7   AST (SGOT) U/L 15   ALT (SGPT) U/L 17   ALK PHOS U/L 80   BILIRUBIN mg/dL 0.8   ANION GAP mmol/L 7.3       No radiology results for the last day      I reviewed the patient's new clinical results.  I reviewed the patient's new imaging results and agree with the interpretation.    Assessment/Plan:     Active and Resolved Problems  Active Hospital Problems    Diagnosis  POA    **Cervical myelopathy [G95.9]  Unknown    S/P cervical spinal fusion [Z98.1]  Not Applicable    Feeding difficulties [R63.30]  Unknown    Dysphagia [R13.10]  Unknown      Resolved Hospital Problems   No resolved problems to display.       #Cervical myelopathy status post C3-5 ACDF  Management per primary neurosurgery     #Dysphagia  Since post-OP  SLP following status post barium swallow study 6/10 negative for aspiration  GI consulted 6/10 status post PEG tube placement 6/11.  Started on tube feeds titrate to goal rate   Remains on IV dexamethasone per LANCE        #DM2  Hold OHA  Start Lantus  7 units twice daily  ISS lispro moderate correctional scale every 6 while on TF     #Hypertension  Resume home antihypertensives and monitor blood pressure     stable for discharge Once tube feeds at goal rate    VTE Prophylaxis:  Mechanical VTE prophylaxis orders are present.         Code status is   Code Status and Medical Interventions:   Ordered at: 06/07/24 1227     Level Of Support Discussed With:    Patient     Code Status (Patient has no pulse and is not breathing):    CPR (Attempt to Resuscitate)     Medical Interventions (Patient has pulse or is breathing):    Full Support         Time: 30 minutes    Signature: Electronically signed by Alfredo Jiménez MD, 06/12/24, 13:11 EDT.  [facility] Hospitalist Team

## 2024-06-12 NOTE — PROGRESS NOTES
LOS: 4 days   Patient Care Team:  Marissa Prasad MD as PCP - General (Family Medicine)  Akin Brown MD as Consulting Physician (Cardiology)      Subjective     Interval History:     Subjective: Patient with no new complaints.  Tolerating tube feeds at 30 cc/h.  Denies abdominal pain.      ROS:   No chest pain, shortness of breath, or cough.        Medication Review:     Current Facility-Administered Medications:     acetaminophen (TYLENOL) tablet 650 mg, 650 mg, Oral, Q4H PRN **OR** acetaminophen (TYLENOL) 160 MG/5ML oral solution 650 mg, 650 mg, Oral, Q4H PRN, 650 mg at 06/12/24 0908 **OR** acetaminophen (TYLENOL) suppository 650 mg, 650 mg, Rectal, Q4H PRN, Niranjan Rich MD    amLODIPine (NORVASC) tablet 10 mg, 10 mg, Per PEG Tube, Q24H, Bang Mirza IV, MD, 10 mg at 06/12/24 0843    atorvastatin (LIPITOR) tablet 80 mg, 80 mg, Per PEG Tube, Daily, Bang Mirza IV, MD, 80 mg at 06/12/24 0843    Barium Sulfate 60 % cream 1 teaspoon(s), 1 teaspoon(s), Oral, Once in imaging, Niranjan Rich MD    sennosides-docusate (PERICOLACE) 8.6-50 MG per tablet 2 tablet, 2 tablet, Oral, BID PRN **AND** polyethylene glycol (MIRALAX) packet 17 g, 17 g, Oral, Daily PRN **AND** bisacodyl (DULCOLAX) EC tablet 5 mg, 5 mg, Oral, Daily PRN **AND** bisacodyl (DULCOLAX) suppository 10 mg, 10 mg, Rectal, Daily PRN, Niranjan Rich MD    cyclobenzaprine (FLEXERIL) tablet 10 mg, 10 mg, Oral, TID PRN, Niranjan Rich MD    dexAMETHasone (DECADRON) injection 4 mg, 4 mg, Intravenous, Q12H, Niranjan Rich MD, 4 mg at 06/12/24 0843    dextrose (D50W) (25 g/50 mL) IV injection 25 g, 25 g, Intravenous, Q15 Min PRN, Niranjan Rich MD    dextrose (GLUTOSE) oral gel 15 g, 15 g, Oral, Q15 Min PRN, Niranjan Rich MD    glucagon (GLUCAGEN) injection 1 mg, 1 mg, Intramuscular, Q15 Min PRN, Niranjan Rich MD    hydrALAZINE (APRESOLINE) injection 10 mg, 10 mg, Intravenous, Q6H PRN, Hilario  Niranjan Andrews MD, 10 mg at 06/06/24 1711    insulin glargine (LANTUS, SEMGLEE) injection 7 Units, 7 Units, Subcutaneous, Q12H, Alfredo Jiménez MD, 7 Units at 06/12/24 0908    insulin lispro (HUMALOG/ADMELOG) injection 2-9 Units, 2-9 Units, Subcutaneous, Q6H, Niranjan Rich MD, 2 Units at 06/12/24 0542    ipratropium-albuterol (DUO-NEB) nebulizer solution 3 mL, 3 mL, Nebulization, Q4H PRN, Bang Mirza IV, MD    melatonin tablet 5 mg, 5 mg, Oral, Nightly PRN, Niranjan Rich MD    metoprolol succinate XL (TOPROL-XL) 24 hr tablet 25 mg, 25 mg, Oral, Daily, Niranjan Rich MD, 25 mg at 06/12/24 0843    morphine injection 2 mg, 2 mg, Intravenous, Q4H PRN, Alfredo Jiménez MD    [DISCONTINUED] HYDROmorphone (DILAUDID) injection 0.5 mg, 0.5 mg, Intravenous, Q4H PRN, 0.5 mg at 06/07/24 1311 **AND** naloxone (NARCAN) injection 0.4 mg, 0.4 mg, Intravenous, Q5 Min PRN, Niranjan Rich MD    ondansetron ODT (ZOFRAN-ODT) disintegrating tablet 4 mg, 4 mg, Oral, Q6H PRN **OR** ondansetron (ZOFRAN) injection 4 mg, 4 mg, Intravenous, Q6H PRN, Niranjan Rich MD    oxyCODONE (ROXICODONE) immediate release tablet 5 mg, 5 mg, Per PEG Tube, Q4H PRN, Alfredo Jiménez MD, 5 mg at 06/12/24 0908    pantoprazole (PROTONIX) injection 40 mg, 40 mg, Intravenous, BID AC, Niranjan Rich MD, 40 mg at 06/12/24 0843    Pharmacy Consult - Steroid Insulin Protocol, , Does not apply, Continuous PRN, Niranjan Rich MD    sodium chloride 0.9 % flush 10 mL, 10 mL, Intravenous, Q12H, Niranjan Rich MD, 10 mL at 06/12/24 0844    sodium chloride 0.9 % flush 10 mL, 10 mL, Intravenous, PRN, Niranjan Rich MD, 10 mL at 06/06/24 1711    sodium chloride 0.9 % infusion 40 mL, 40 mL, Intravenous, PRN, Niranjan Rich MD    sodium chloride 0.9 % infusion, 100 mL/hr, Intravenous, Continuous, Nrianjan Rich MD, Last Rate: 100 mL/hr at 06/12/24 0833, 100 mL/hr at 06/12/24  0833      Objective     Vital Signs  Vitals:    06/12/24 0009 06/12/24 0417 06/12/24 0550 06/12/24 0843   BP: 127/72 100/53  146/73   BP Location: Left arm Left arm     Patient Position: Sitting Lying     Pulse: 73 65  88   Resp: 20 20     Temp: 97.2 °F (36.2 °C) 97.9 °F (36.6 °C)     TempSrc: Oral Oral     SpO2: 93% 93%     Weight:   99.6 kg (219 lb 9.3 oz)    Height:           Physical Exam:     General Appearance:    Awake and alert, in no acute distress   Head:    Normocephalic, without obvious abnormality   Eyes:          Conjunctivae normal, anicteric sclera   Throat:   No oral lesions, no thrush, oral mucosa moist   Neck:   No adenopathy, supple, no JVD   Lungs:     respirations regular, even and unlabored   Abdomen:     Soft, non-tender, no rebound or guarding, non-distended, PEG tube to left upper quadrant is freely movable without redness or induration   Rectal:     Deferred   Extremities:   No edema, no cyanosis   Skin:   No bruising or rash, no jaundice        Results Review:    CBC    Results from last 7 days   Lab Units 06/10/24  2313 06/10/24  0021 06/07/24  0006 06/06/24  1557 06/06/24  0949 06/06/24  0837   WBC 10*3/mm3 6.32 6.28 6.27  --   --   --    HEMOGLOBIN g/dL 14.3 13.9 13.5  --   --   --    HEMOGLOBIN, POC g/dL  --   --   --  15.0 11.9* 11.9*   PLATELETS 10*3/mm3 111* 124* 134*  --   --   --      CMP   Results from last 7 days   Lab Units 06/10/24  2313 06/10/24  0021 06/07/24  0006   SODIUM mmol/L 139 137 135*   POTASSIUM mmol/L 3.7 4.1 4.6   CHLORIDE mmol/L 107 106 102   CO2 mmol/L 24.7 22.4 21.5*   BUN mg/dL 23 23 16   CREATININE mg/dL 0.73* 0.69* 0.83   GLUCOSE mg/dL 96 184* 282*   ALBUMIN g/dL 3.8  --   --    BILIRUBIN mg/dL 0.8  --   --    ALK PHOS U/L 80  --   --    AST (SGOT) U/L 15  --   --    ALT (SGPT) U/L 17  --   --      Cr Clearance Estimated Creatinine Clearance: 127 mL/min (A) (by C-G formula based on SCr of 0.73 mg/dL (L)).  Coag   Results from last 7 days   Lab Units  06/10/24  2313   INR  1.10   APTT seconds 25.4     HbA1C   Lab Results   Component Value Date    HGBA1C 8.80 (H) 05/28/2024    HGBA1C 9.70 (H) 12/30/2023    HGBA1C 10.2 (H) 11/03/2022         Infection     UA      Microbiology Results (last 10 days)       ** No results found for the last 240 hours. **          Imaging Results (Last 72 Hours)       Procedure Component Value Units Date/Time    XR Spine Cervical 2 or 3 View [[id number]] Collected: 06/11/24 0859     Updated: 06/11/24 0902    Narrative:      XR SPINE CERVICAL 2 OR 3 VW    Date of Exam: 6/7/2024 7:55 AM EDT    Indication: s/p ACDF    Comparison: 7/8/2023    Findings:  Interval ACDF at C3/4 and C4/5. Old ACDF at C5/6. There is obscuration of C7 due to patient positioning. Alignment appears normal. Hardware appears intact. There is mild to moderate mid cervical facet arthropathy.      Impression:      Impression:  1.Interval postoperative changes without acute process identified. Please note that C7 is obscured.      Electronically Signed: Chuck Rosales MD    6/11/2024 9:00 AM EDT    Workstation ID: [id number]    FL Video Swallow With Speech Single Contrast [[id number]] Resulted: 06/10/24 1052     Updated: 06/10/24 1052    Narrative:      This procedure was auto-finalized with no dictation required.            Assessment & Plan     ASSESSMENT:  -Dysphagia  -Failed video swallow evaluation  -Nausea  -Cervical myelopathy s/p ACDF on 6/6/2024  -History of DVT  -Depression  -DMII  -Hyperlipidemia  -History of hernia repair  -History of appendectomy     PLAN:  Patient is a 74-year-old male with recent ACDF on 6/6/2024 who was admitted postoperatively.  He has been having difficulty with significant dysphagia and failed video swallow evaluation earlier today.  GI asked to consult due to feeding difficulty and dysphagia.    Patient with no new complaints.  Denies abdominal pain.  S/p EGD with PEG tube placement yesterday.  Tolerating enteral feeds at 30 cc/h.   Continue to advance per dietitian recommendation.  Continue PPI.  Not much else to add from a GI standpoint as an inpatient at this time.  Will be available as needed.    Electronically signed by SHAWN Collins, 06/12/24, 9:32 AM EDT.

## 2024-06-12 NOTE — PLAN OF CARE
Goal Outcome Evaluation:                   Pt is sitting up in chair at this time. Stand by to min assist with transfers due to right side weakness. Tube feeding is advanced 15 mL/hr every 4 hours as ordered. Pt is tolerating well at this time. Pt denies any n/v or feeling full at this time. Pt is AO x4 and is able to make wants and needs known, pleasant and cooperative with staff. Call light is within reach. Plans to discharge this evening to rehab with family to transport.

## 2024-06-12 NOTE — DISCHARGE INSTR - DIET
It is recommended that he remain NPO with the exception of ice chips/water sips as per the [employer] Protocol for now. Continue alternative means of nutrition. He will require extensive dysphagia rehab and repeat instrumental assessment of swallow in approximately 4-6 weeks or as indicated clinically.        Guidelines for proper implementation include:   Thorough oral care prior to consuming water  Upright at 90 degree hip flexion  Small sips at slow rate    Diabetisource AC @ 80 mL/hr with 30 mL/hr FWF.

## 2024-06-12 NOTE — THERAPY TREATMENT NOTE
"Subjective: Pt agreeable to therapeutic plan of care.    Objective:     Bed mobility - N/A or Not attempted. Pt up in chair upon arrival.     Transfers - Supervision and with rolling walker    Ambulation - 450 feet SBA and with rolling walker    Therapeutic Exercise - 15 Reps B LE AROM supported sitting / chair    Vitals: WNL    Pain: Pt denies    Education: Provided education on the importance of mobility in the acute care setting, Verbal/Tactile Cues, Transfer Training, and Gait Training    Assessment: Gurinder Amezcua presents with functional mobility impairments which indicate the need for skilled intervention. Tolerating session today without incident. Pt will need SNF placement at discharge for peg tube care needs/education, and more intensive speech therapy for dysphagia deficits. Will continue to follow and progress as tolerated.     Plan/Recommendations:   If medically appropriate, Low Intensity Therapy recommended post-acute care - This is recommended as therapy feels this patient would require 2-3 visits per week. OP or HH would be the best option depending on patient's home bound status. Consider, if the patient has other  \"skilled\" needs such as wounds, IV antibiotics, etc. Combined with \"low intensity\" could also equate to a SNF. If patient is medically complex, consider LTAC. Pt requires no DME at discharge.     Pt desires Skilled Rehab placement at discharge. Pt cooperative; agreeable to therapeutic recommendations and plan of care.         Basic Mobility 6-click:  Rollin = Total, A lot = 2, A little = 3; 4 = None  Supine>Sit:   1 = Total, A lot = 2, A little = 3; 4 = None   Sit>Stand with arms:  1 = Total, A lot = 2, A little = 3; 4 = None  Bed>Chair:   1 = Total, A lot = 2, A little = 3; 4 = None  Ambulate in room:  1 = Total, A lot = 2, A little = 3; 4 = None  3-5 Steps with railin = Total, A lot = 2, A little = 3; 4 = None  Score: 24    Modified Bend: N/A = No pre-op " stroke/TIA    Post-Tx Position: Up in Chair, Alarms activated, and Call light and personal items within reach  PPE: gloves

## 2024-06-13 NOTE — PROGRESS NOTES
Subjective     Chief Complaint   Patient presents with    Post-op         Previous Treatment: C3-5 ACDF 6/6/24; PEG tube placement on 6/11/2024    HPI: Gurinder Amezcua is a 74 y.o. male presenting for initial postoperative follow-up after undergoing C3-5 ACDF with Dr. Mirza.  Notably, patient experienced substantial postoperative dysphagia which ultimately required PEG tube placement.  Today patient reports he is doing well regarding his neck pain and upper extremity symptoms.  He is moving both arms well without focal weakness.  He denies fever and chills.  He reports no improvement in his difficulty swallowing.  The only way he is able to swallow is if he is laying on his right side, and he still struggles with ice chips and fluids.  Otherwise he has no new complaints.        PMH:  Past Medical History:   Diagnosis Date    Ankylosing spondylitis of site in spine 1998    Anxiety     Carotid artery occlusion     Cataracts, bilateral 04/2022    Cervical disc disorder     Chronic pain disorder     Coronary artery disease of native artery of native heart with stable angina pectoris 12/28/2023    Deep vein thrombosis 2018 approx    Depression 6/19    Diabetes mellitus 2017 approx    Headache Always    Heart attack 06/17/2021    Low back pain     Lumbosacral disc disease     Migraine Unknown    Mixed hyperlipidemia 03/08/2018    Neck pain Feb 2023    Peripheral neuropathy     Rheumatoid arthritis Unknown    Shingles     Spinal stenosis     Thoracic disc disorder     Trigeminal neuralgia     Ureteral stone with hydronephrosis 11/24/2016         Current Outpatient Medications:     aspirin 81 MG EC tablet, Take 1 tablet by mouth Daily., Disp: , Rfl:     atorvastatin (LIPITOR) 80 MG tablet, Take 1 tablet by mouth Daily., Disp: , Rfl:     B-D UF III MINI PEN NEEDLES 31G X 5 MM misc, TEST EVERY DAY, Disp: 100 each, Rfl: 1    empagliflozin (Jardiance) 10 MG tablet tablet, , Disp: , Rfl:     Lancets (freestyle) lancets,  FreeStyle Lancets, Disp: , Rfl:     Lyumjev KwikPen 100 UNIT/ML solution pen-injector, Inject 12 Units under the skin into the appropriate area as directed Every Morning., Disp: , Rfl:     metFORMIN ER (GLUCOPHAGE-XR) 500 MG 24 hr tablet, Take 2 tablets by mouth Daily With Breakfast., Disp: , Rfl:     metoprolol succinate XL (TOPROL-XL) 25 MG 24 hr tablet, Take 1 tablet by mouth Daily., Disp: 90 tablet, Rfl: 3    naloxone (NARCAN) 0.4 MG/ML injection, Infuse 1 mL into a venous catheter Every 5 (Five) Minutes As Needed for Respiratory Depression., Disp: , Rfl:     pantoprazole (PROTONIX) 40 MG EC tablet, Take 1 tablet by mouth Daily., Disp: , Rfl:     Toujeo SoloStar 300 UNIT/ML solution pen-injector injection, Inject 30 Units under the skin into the appropriate area as directed Daily. Inject 25 units QHS, Disp: , Rfl:      Allergies   Allergen Reactions    Adhesive Tape Itching     tegraderm patches causes itching    Latex Unknown - High Severity    Methocarbamol Itching and Rash     Arms etc. No hives.       Pregabalin Confusion and Other (See Comments)     Hypnotic driving episode  Dizziness and inability to focus  Dizziness and inability to focus  Other reaction(s): Confusion  lyrica      Silver Rash        Past Surgical History:   Procedure Laterality Date    ANTERIOR CERVICAL DISCECTOMY W/ FUSION N/A 6/6/2024    Procedure: Cervical 3-5 anterior cervical discectomy and fusion;  Surgeon: Bang Mirza IV, MD;  Location: [facility] MAIN OR;  Service: Neurosurgery;  Laterality: N/A;    BACK SURGERY  2019    4 rods in back    BRAIN SURGERY      CARDIAC CATHETERIZATION  06/17/2021    CARDIAC CATHETERIZATION N/A 01/02/2024    Procedure: Left Heart Cath with angiogram;  Surgeon: Akin Brown MD;  Location: [facility] CATH INVASIVE LOCATION;  Service: Cardiovascular;  Laterality: N/A;    CARDIAC CATHETERIZATION N/A 01/02/2024    Procedure: Left ventriculography;  Surgeon: Akin Brown MD;  Location: [facility] CATH INVASIVE  "LOCATION;  Service: Cardiovascular;  Laterality: N/A;    CAROTID STENT      CORONARY STENT PLACEMENT  2021    CRANIOTOMY      CYBERKNIFE      ENDOSCOPY      ENDOSCOPY W/ PEG TUBE PLACEMENT N/A 2024    Procedure: ESOPHAGOGASTRODUODENOSCOPY WITH PERCUTANEOUS ENDOSCOPIC GASTROSTOMY TUBE INSERTION;  Surgeon: Niranjan Rich MD;  Location: [facility] ENDOSCOPY;  Service: Gastroenterology;  Laterality: N/A;  post op:    EPIDURAL BLOCK      HERNIA REPAIR      KNEE ARTHROSCOPY      LAMINECTOMY      NECK SURGERY  Unknown    SPINAL CORD STIMULATOR IMPLANT  Previously    SPINAL FUSION  Unknown    SPINE SURGERY      TRIGEMINAL NERVE DECOMPRESSION      TRIGGER POINT INJECTION          Family History   Problem Relation Age of Onset    Diabetes Mother             Arthritis Mother     Heart attack Father             Aneurysm Father          Social Hx:  Social History     Tobacco Use   Smoking Status Never    Passive exposure: Never   Smokeless Tobacco Never      Alcohol Use: Not At Risk (2024)    AUDIT-C     Frequency of Alcohol Consumption: Never     Average Number of Drinks: Patient does not drink     Frequency of Binge Drinking: Never      Social History     Substance and Sexual Activity   Drug Use Never          Review of Systems   Constitutional:  Positive for activity change.   HENT:  Positive for trouble swallowing.    Eyes: Negative.    Respiratory: Negative.     Cardiovascular: Negative.    Gastrointestinal: Negative.    Endocrine: Negative.    Genitourinary: Negative.    Musculoskeletal: Negative.  Negative for arthralgias.   Skin:         Looks great    Allergic/Immunologic: Negative.    Neurological:  Positive for headaches.        Electric   shock    Hematological: Negative.    Psychiatric/Behavioral:  Positive for sleep disturbance.          Objective     /71   Pulse 79   Resp 18   Ht 195.6 cm (77\")   Wt 92.5 kg (204 lb)   SpO2 96%   BMI 24.19 kg/m²    Body mass index is " 24.19 kg/m².      Physical Exam  Vitals reviewed.   Constitutional:       General: He is not in acute distress.     Appearance: Normal appearance.   HENT:      Nose:      Comments: PEG tube in place  Cardiovascular:      Rate and Rhythm: Normal rate and regular rhythm.      Pulses: Normal pulses.   Pulmonary:      Effort: Pulmonary effort is normal. No respiratory distress.   Musculoskeletal:         General: No swelling or tenderness. Normal range of motion.      Right lower leg: No edema.      Left lower leg: No edema.   Skin:     General: Skin is warm and dry.      Findings: No bruising, erythema or rash.      Comments: Surgical incision CDI w/ no swelling redness or drainage   Neurological:      General: No focal deficit present.      Mental Status: He is alert and oriented to person, place, and time.      Sensory: Sensation is intact.      Motor: Motor function is intact. No weakness.              Assessment & Plan     MDM: Gurinder Amezcua is a 74 y.o. male here for initial postoperative follow-up after undergoing C3-5 ACDF with Dr. Mirza on 6/6/2024.  Patient is neurologically intact with no focal deficits.  Neck pain and upper extremity symptoms seem to be improving. Incision is healing nicely with no signs of infection or wound breakdown.     He continues to have dysphagia, unchanged since discharge from the hospital.  PEG tube still in place.  Has been working with SLP at his rehab facility.  I am referring him to ENT for further evaluation and management of dysphagia as well as management of his PEG tube.    From my standpoint patient is fine to begin outpatient physical therapy.  They should continue to limit bending and twisting at the neck and advance their level of activity w/ PT guidance. No lifting >30 lbs. They may shower but should refrain from submerging the incision underwater, including pools, bathtubs, and hot tubs, for the next 4 weeks.     I am ordering a CT scan to evaluate for  pseudarthrosis vs adequate osseous fusion across the operative levels, which should be completed prior to their 3-month follow-up.     I will have them follow-up with Dr. Mirza 3 months postoperatively to evaluate their progress and review imaging, if applicable.  Patient is agreeable to this plan.         Diagnosis Plan   1. S/P spinal fusion  Ambulatory Referral to Physical Therapy    CT Cervical Spine Without Contrast      2. 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 Dysphagia, unspecified type  Ambulatory Referral to ENT (Otolaryngology)      4. Esophageal dysmotility  Ambulatory Referral to ENT (Otolaryngology)          Return in about 3 months (around 9/24/2024) for 3-month postoperative follow-up with Dr. Mirza.              This patient was examined wearing appropriate personal protective equipment.            Issa Justin PA-C    06/26/24  10:40 EDT      Part of this note may be an electronic transcription/translation of spoken language to printed text using the Dragon Dictation System.

## 2024-06-13 NOTE — CASE MANAGEMENT/SOCIAL WORK
Case Management Discharge Note      Final Note: CESAR    Provided Post Acute Provider List?: Yes  Post Acute Provider List: Nursing Home  N/A Provider List Comment: denies dc needs.  Does not want home health or outpatient at this time.  Delivered To: Patient  Method of Delivery: In person    Selected Continued Care - Discharged on 6/12/2024 Admission date: 6/6/2024 - Discharge disposition: Skilled Nursing Facility (DC - External)      Destination Coordination complete.      Service Provider Selected Services Address Phone Fax Patient Preferred    [facility] [address] [place] [phone] --                    Transportation Services  Private: Car    Final Discharge Disposition Code: 03 - skilled nursing facility (SNF)

## 2024-06-24 ENCOUNTER — OFFICE VISIT (OUTPATIENT)
Dept: NEUROSURGERY | Facility: CLINIC | Age: 74
End: 2024-06-24
Payer: MEDICARE

## 2024-06-24 VITALS
RESPIRATION RATE: 18 BRPM | WEIGHT: 204 LBS | BODY MASS INDEX: 24.09 KG/M2 | HEIGHT: 77 IN | SYSTOLIC BLOOD PRESSURE: 114 MMHG | HEART RATE: 79 BPM | OXYGEN SATURATION: 96 % | DIASTOLIC BLOOD PRESSURE: 71 MMHG

## 2024-06-24 DIAGNOSIS — Z98.1 S/P SPINAL FUSION: Primary | ICD-10-CM

## 2024-06-24 DIAGNOSIS — K22.4 ESOPHAGEAL DYSMOTILITY: ICD-10-CM

## 2024-06-24 DIAGNOSIS — M50.321 ADJACENT SEGMENT DISEASE OF CERVICAL SPINE AT C4-C5 LEVEL WITH HISTORY OF FUSION PROCEDURE: ICD-10-CM

## 2024-06-24 DIAGNOSIS — R13.10 DYSPHAGIA, UNSPECIFIED TYPE: ICD-10-CM

## 2024-06-24 DIAGNOSIS — Z98.1 ADJACENT SEGMENT DISEASE OF CERVICAL SPINE AT C4-C5 LEVEL WITH HISTORY OF FUSION PROCEDURE: ICD-10-CM

## 2024-06-24 PROCEDURE — 99024 POSTOP FOLLOW-UP VISIT: CPT

## 2024-06-24 PROCEDURE — 1159F MED LIST DOCD IN RCRD: CPT

## 2024-06-24 PROCEDURE — 1160F RVW MEDS BY RX/DR IN RCRD: CPT

## 2024-06-25 ENCOUNTER — TELEPHONE (OUTPATIENT)
Dept: NEUROSURGERY | Facility: CLINIC | Age: 74
End: 2024-06-25

## 2024-06-25 NOTE — TELEPHONE ENCOUNTER
APPOINTMENT MADE WITH ENT, CALLED PATIENT AND HE REQUESTED WE GIVE APPOINTMENT DETAILS TO SON,KIRILL. LEFT SON VOICE MAIL TO RETURN CALL TO OFFICE. OKAY FOR HUB TO GIVE APPT DETAILS TO SON KRIILL:  [facility] DR EDUARDO  FRIDAY, 6/28/24  10:30AM  [address] [facility] [address]  PLEASE BRING PHOTO ID, INSURANCE INFORMATION AND LIST OF MEDICATIONS  PHONE: [phone]  PLEASE GIVE 24 HRS NOTICE IF YOU NEED TO CANCEL OR RESCHEDULE

## 2024-06-26 NOTE — TELEPHONE ENCOUNTER
PLACED 2ND CALL TO SON KIRILL TO NOTIFY REGARDING APPOINTMENT. NO ANSWER. LEFT MESSAGE TO RETURN CALL TO OFFICE. OKAY FOR ROSETTE TO RELAY APPOINTMENT INFORMATION IF SON CALLS BACK.

## 2024-07-03 ENCOUNTER — TREATMENT (OUTPATIENT)
Dept: PHYSICAL THERAPY | Facility: CLINIC | Age: 74
End: 2024-07-03
Payer: COMMERCIAL

## 2024-07-03 DIAGNOSIS — M54.2 PAIN, NECK: Primary | ICD-10-CM

## 2024-07-03 DIAGNOSIS — Z98.1 S/P CERVICAL SPINAL FUSION: ICD-10-CM
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
## 2024-07-23 ENCOUNTER — HOSPITAL ENCOUNTER (OUTPATIENT)
Dept: CT IMAGING | Facility: HOSPITAL | Age: 74
Discharge: HOME | End: 2024-07-23
Payer: MEDICARE

## 2024-07-23 DIAGNOSIS — Z98.1 S/P SPINAL FUSION: ICD-10-CM

## 2024-07-23 DIAGNOSIS — Z98.1 ADJACENT SEGMENT DISEASE OF CERVICAL SPINE AT C4-C5 LEVEL WITH HISTORY OF FUSION PROCEDURE: ICD-10-CM

## 2024-07-23 DIAGNOSIS — M50.321 ADJACENT SEGMENT DISEASE OF CERVICAL SPINE AT C4-C5 LEVEL WITH HISTORY OF FUSION PROCEDURE: ICD-10-CM

## 2024-07-23 PROCEDURE — 72125 CT NECK SPINE W/O DYE: CPT

## 2024-08-16 ENCOUNTER — TRANSCRIBE ORDERS (OUTPATIENT)
Dept: ADMINISTRATIVE | Facility: HOSPITAL | Age: 74
End: 2024-08-16
Payer: MEDICARE

## 2024-08-16 DIAGNOSIS — R13.10 DYSPHAGIA, UNSPECIFIED TYPE: Primary | ICD-10-CM

## 2024-08-19 NOTE — PROGRESS NOTES
"Subjective   History of Present Illness: Gurinder Amezcua is a 74 y.o. male is here today for surgical follow-up with a new Cervical CT. Today patient reports he has no neck pain. He has difficulty swallowing and has a peg tube. He is seeing an ENT 8/29/2024.  In terms of his neck and his preoperative symptoms these are significantly improved.  Unfortunately continues to require PEG tube for feedings and is unable to swallow.  He continues to work with speech therapy and ENT.    Chief Complaint   Patient presents with    Post-op     No neck pain, but has difficulty swallowing          Previous treatment: PO PT, speech therapy,     Previous neurosurgery:  C3-5 ACDF 6/6/24; PEG tube placement on 6/11/2024     Previous injections:     The following portions of the patient's history were reviewed and updated as appropriate: allergies, current medications, past family history, past medical history, past social history, past surgical history, and problem list.    Review of Systems   Constitutional:  Positive for activity change.   HENT:  Positive for trouble swallowing.    Respiratory:  Negative for chest tightness and stridor.    Musculoskeletal:  Negative for myalgias and neck pain.       Objective      /82   Pulse 80   Resp 20   Ht 195.6 cm (77\")   Wt 89.4 kg (197 lb)   SpO2 96%   BMI 23.36 kg/m²    Body mass index is 23.36 kg/m².  Vitals:    08/26/24 0923   PainSc: 0-No pain           Neurologic Exam    Assessment & Plan   Independent Review of Radiographic Studies:      I personally reviewed and interpreted the images from the following studies.    CT cervical spine: Hardware intact and in good position with evidence of developing fusion    Medical Decision Making:      Gurinder Amezcua is a 74 y.o. male status post C3-5 ACDF doing well in terms of preoperative symptoms and CT scan is reassuring.  Unfortunately the patient continues to have severe dysphagia.  He is to follow-up with ENT.  He is also set " to begin outpatient speech therapy as his and home speech therapy has ended.  We will ensure that he gets an appointment to start this soon as possible.  I will see him back in 6 weeks to evaluate his progress.      Diagnoses and all orders for this visit:    1. S/P spinal fusion (Primary)      No follow-ups on file.    This patient was examined wearing appropriate personal protective equipment.                      Dr. Bang Mirza IV    08/26/24  09:47 EDT

## 2024-08-21 ENCOUNTER — TRANSCRIBE ORDERS (OUTPATIENT)
Dept: SPEECH THERAPY | Facility: HOSPITAL | Age: 74
End: 2024-08-21
Payer: MEDICARE

## 2024-08-21 ENCOUNTER — HOSPITAL ENCOUNTER (OUTPATIENT)
Dept: GENERAL RADIOLOGY | Facility: HOSPITAL | Age: 74
Discharge: HOME | End: 2024-08-21
Admitting: FAMILY MEDICINE
Payer: MEDICARE

## 2024-08-21 DIAGNOSIS — R13.10 DYSPHAGIA, UNSPECIFIED TYPE: Primary | ICD-10-CM

## 2024-08-21 DIAGNOSIS — R13.10 DYSPHAGIA, UNSPECIFIED TYPE: ICD-10-CM

## 2024-08-21 PROCEDURE — 74220 X-RAY XM ESOPHAGUS 1CNTRST: CPT

## 2024-08-21 PROCEDURE — 63710000001 BARIUM SULFATE 40 % SUSPENSION: Performed by: FAMILY MEDICINE

## 2024-08-21 PROCEDURE — A9270 NON-COVERED ITEM OR SERVICE: HCPCS | Performed by: FAMILY MEDICINE

## 2024-08-21 RX ADMIN — BARIUM SULFATE 50 ML: 400 SUSPENSION ORAL at 09:30

## 2024-08-22 ENCOUNTER — HOSPITAL ENCOUNTER (OUTPATIENT)
Dept: GENERAL RADIOLOGY | Facility: HOSPITAL | Age: 74
Discharge: HOME | End: 2024-08-22
Admitting: FAMILY MEDICINE
Payer: MEDICARE

## 2024-08-22 DIAGNOSIS — R13.10 DYSPHAGIA, UNSPECIFIED TYPE: ICD-10-CM

## 2024-08-22 PROCEDURE — 74230 X-RAY XM SWLNG FUNCJ C+: CPT
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
## 2024-08-26 ENCOUNTER — OFFICE VISIT (OUTPATIENT)
Dept: NEUROSURGERY | Facility: CLINIC | Age: 74
End: 2024-08-26
Payer: COMMERCIAL

## 2024-08-26 VITALS
BODY MASS INDEX: 23.26 KG/M2 | HEART RATE: 80 BPM | RESPIRATION RATE: 20 BRPM | WEIGHT: 197 LBS | OXYGEN SATURATION: 96 % | SYSTOLIC BLOOD PRESSURE: 118 MMHG | DIASTOLIC BLOOD PRESSURE: 82 MMHG | HEIGHT: 77 IN

## 2024-08-26 DIAGNOSIS — Z98.1 S/P SPINAL FUSION: Primary | ICD-10-CM

## 2024-08-26 PROCEDURE — 99024 POSTOP FOLLOW-UP VISIT: CPT | Performed by: NEUROLOGICAL SURGERY

## 2024-08-27 ENCOUNTER — HOSPITAL ENCOUNTER (OUTPATIENT)
Dept: SPEECH THERAPY | Facility: HOSPITAL | Age: 74
Discharge: HOME | End: 2024-08-27
Admitting: FAMILY MEDICINE
Payer: MEDICARE

## 2024-08-27 PROCEDURE — 92526 ORAL FUNCTION THERAPY: CPT

## 2024-08-27 NOTE — PROGRESS NOTES
Outpatient Speech Language Pathology   Adult Swallow Treatment Note       Patient Name: Gurinder Amezcua  : 1950  MRN: [id number]  Today's Date: 2024         Visit Date: 2024   Patient Active Problem List   Diagnosis    Type 2 diabetes mellitus with diabetic polyneuropathy, with long-term current use of insulin    Dyslipidemia    Acute deep vein thrombosis (DVT) of femoral vein of right lower extremity    Arthritis    Esophageal obstruction    Other esophagitis without bleeding    Cerebral infarction, unspecified    Chronic back pain    Chronic fatigue    Degenerative disc disease, lumbar    Diaphragmatic hernia without obstruction or gangrene    Dysphagia    Encounter for screening colonoscopy    Lumbar disc disease with radiculopathy    Lower leg DVT (deep venous thromboembolism), chronic, right    History of iron deficiency    History of DVT (deep vein thrombosis)    Herniated nucleus pulposus of lumbosacral region    Hearing loss    Gastro-esophageal reflux disease without esophagitis    Gastroesophageal reflux disease without esophagitis    Gastric foreign body    Ureteral stone with hydronephrosis    Trigeminal neuralgia    Recurrent kidney stones    Nephrolithiasis    Polyp of stomach and duodenum    Mixed hyperlipidemia    Major depressive disorder, single episode, unspecified    Lumbosacral spondylosis without myelopathy    Maxillary pain    Vitamin D deficiency    Type 2 diabetes mellitus with hyperglycemia    Coronary artery disease of native artery of native heart with stable angina pectoris    Abnormal nuclear stress test    Cervical myelopathy    S/P cervical spinal fusion    Feeding difficulties        Visit Dx:  No diagnosis found.    Diagnosis: Functional oral phase ; severe pharyngeal dysphagia.     Subjective: Pt seen on this date to initiate outpatient dysphagia therapy services. Pt currently NPO w/ PEG feedings since 2024. Pt previously seen on 2024 for VFSS where it  was recommended he remain NPO w/ PEG feedings and participate in dysphagia therapy targeting pharyngeal strengthening. Pt reports he has participated in ST services prior: 19 days inpatient rehab, 7 weeks home health 1x/week. Pt reports he has ENT appointment on Thursday and a neurology appointment on Friday with hopes results will aid in dysphagia therapy. Despite pt being NPO, he reports he will take sips of water and occasionally eat pudding. Pt also reports he continues to lose weight unintentionally - he is expecting follow up appointment with nutritionist in the next month.     Objective: Pt educated on Effortful Swallow and Supraglottic Swallow exercises. Practiced with SLP to ensure completing correctly. Pt given home tracking sheet and recommended to complete 45-60 swallows/day utilizing targeted exercises. Pt noted to lean to the right to swallow secretions - pt reports he has to lean to the right every time to swallow or else it feels stuck. EMST-75 introduced - resistance increased to 35. Pt demonstrated ability to perform EMST exercise and completed 5 reps with 15 second break in between. Pt given home tracking sheet and recommended to complete 5 sets of 5 reps/5x week. Pt verbalized understanding.     Plan: Initiate Intensive Dysphagia Rehabilitation (IDR) - pt comes for outpatient dysphagia therapy 1-2x/week, HEP for carryover and continuous practice of swallow/exercises. Repeat VFSS following 5 weeks of IDR to gain instrumental assessment data of therapy effectiveness.  Pt agreeable to plan and reports he is very motivated to improve swallow. Pt scheduled for next appointment Wednesday September 4, 2024.      LTG1: Patient will tolerate safest and least restrictive diet without complications from aspiration independently by participating in skilled dysphagia therapy targeting pharyngeal strengthening.     STG1: Patient will participate in dysphagia therapy targeting pharyngeal strengthening to  improve pharyngeal swallow with minimal cues, including but not limited to: hard effortful swallow, supraglottic swallow, CTAR, sabi, EMST.     STG2: Pt will improve swallow function as noted by improvement with use of VFSS, patient reports, and increased oral intake with varied consistencies to improve overall quality of life.            Ora Otto, SLP  8/27/2024

## 2024-09-04 ENCOUNTER — HOSPITAL ENCOUNTER (OUTPATIENT)
Dept: SPEECH THERAPY | Facility: HOSPITAL | Age: 74
Discharge: HOME | End: 2024-09-04
Admitting: FAMILY MEDICINE
Payer: MEDICARE

## 2024-09-04 PROCEDURE — 92526 ORAL FUNCTION THERAPY: CPT

## 2024-09-04 NOTE — THERAPY TREATMENT NOTE
Outpatient Speech Language Pathology   Adult Swallow Treatment Note  CHRISTINA Fitzgerald     Patient Name: Gurinder Amezcua  : 1950  MRN: [id number]  Today's Date: 2024         Visit Date: 2024   Patient Active Problem List   Diagnosis    Type 2 diabetes mellitus with diabetic polyneuropathy, with long-term current use of insulin    Dyslipidemia    Acute deep vein thrombosis (DVT) of femoral vein of right lower extremity    Arthritis    Esophageal obstruction    Other esophagitis without bleeding    Cerebral infarction, unspecified    Chronic back pain    Chronic fatigue    Degenerative disc disease, lumbar    Diaphragmatic hernia without obstruction or gangrene    Dysphagia    Encounter for screening colonoscopy    Lumbar disc disease with radiculopathy    Lower leg DVT (deep venous thromboembolism), chronic, right    History of iron deficiency    History of DVT (deep vein thrombosis)    Herniated nucleus pulposus of lumbosacral region    Hearing loss    Gastro-esophageal reflux disease without esophagitis    Gastroesophageal reflux disease without esophagitis    Gastric foreign body    Ureteral stone with hydronephrosis    Trigeminal neuralgia    Recurrent kidney stones    Nephrolithiasis    Polyp of stomach and duodenum    Mixed hyperlipidemia    Major depressive disorder, single episode, unspecified    Lumbosacral spondylosis without myelopathy    Maxillary pain    Vitamin D deficiency    Type 2 diabetes mellitus with hyperglycemia    Coronary artery disease of native artery of native heart with stable angina pectoris    Abnormal nuclear stress test    Cervical myelopathy    S/P cervical spinal fusion    Feeding difficulties        Visit Dx:  No diagnosis found.    Diagnosis: Functional oral phase ; severe pharyngeal dysphagia.      Subjective: Pt seen on this date to initiate outpatient dysphagia therapy services. Pt currently NPO w/ PEG feedings since 2024. Pt previously seen on 2024 for VFSS  where it was recommended he remain NPO w/ PEG feedings and participate in dysphagia therapy targeting pharyngeal strengthening. Pt reports he has participated in ST services prior: 19 days inpatient rehab, 7 weeks home health 1x/week. Pt reports he has cyber-knife appointment on Friday to help with facial nerve pain. Pt also reports he drinks water and some juices at home.      Objective: Pt home exercise program reviewed - pt followed plan throughout the week, however required re-education on exercises to ensure completing correctly. Demonstration for Effortful Swallow and Super Supraglottic Swallow exercises given. Chin Tuck Against Resistance (CTAR) introduced and demonstrated, utilizing washcloths for resistance. Exercises completed during session: Effortful swallow x15, Super Supraglottic Exercise x10, CTAR x20. Pt completed dry swallows and had trials of NTL apple juice. Review of EMST exercise given, EMST device resistance raised to 45 - pt completed exercise x10. Resistance raised to 55 d/t pt performance, pt completed x5. Pt continues to lean right when he swallows and reported he sometimes feels liquid going towards his nose. Pt denied that any liquid comes out of his nose, but the sensation is there. Since pt continues to drink water and other liquids at home despite recommendations, safe swallowing strategies of small sips, effortful swallow, sitting 90 degrees when swallowing were discussed. Pt verbalized understanding. Home exercise program reviewed with pt - verbalized understanding. Handouts provided to aid in correct completion and understanding of exercises and frequency.      Plan: Continue Intensive Dysphagia Rehabilitation (IDR) - pt comes for outpatient dysphagia therapy 1-2x/week, HEP for carryover and continuous practice of swallow/exercises. Repeat VFSS following 5 weeks of IDR to gain instrumental assessment data of therapy effectiveness.  Pt agreeable to plan and reports he is very  motivated to improve swallow. Pt scheduled for next appointment Tuesday September 10, 2024.        LTG1: Patient will tolerate safest and least restrictive diet without complications from aspiration independently by participating in skilled dysphagia therapy targeting pharyngeal strengthening.      STG1: Patient will participate in dysphagia therapy targeting pharyngeal strengthening to improve pharyngeal swallow with minimal cues, including but not limited to: hard effortful swallow, supraglottic swallow, CTAR, sabi, EMST.      STG2: Pt will improve swallow function as noted by improvement with use of VFSS, patient reports, and increased oral intake with varied consistencies to improve overall quality of life.         Ora Otto, SLP  9/4/2024

## 2024-09-08 ENCOUNTER — HOSPITAL ENCOUNTER (EMERGENCY)
Facility: HOSPITAL | Age: 74
Discharge: HOME | End: 2024-09-08
Attending: EMERGENCY MEDICINE | Admitting: EMERGENCY MEDICINE
Payer: MEDICARE

## 2024-09-08 VITALS
WEIGHT: 194 LBS | RESPIRATION RATE: 20 BRPM | DIASTOLIC BLOOD PRESSURE: 91 MMHG | HEIGHT: 77 IN | BODY MASS INDEX: 22.91 KG/M2 | HEART RATE: 91 BPM | TEMPERATURE: 98 F | OXYGEN SATURATION: 97 % | SYSTOLIC BLOOD PRESSURE: 144 MMHG

## 2024-09-08 DIAGNOSIS — Z43.1 ATTENTION TO G-TUBE: Primary | ICD-10-CM

## 2024-09-08 PROCEDURE — 99282 EMERGENCY DEPT VISIT SF MDM: CPT

## 2024-09-09 ENCOUNTER — HOSPITAL ENCOUNTER (OUTPATIENT)
Facility: HOSPITAL | Age: 74
Setting detail: OBSERVATION
Discharge: HOME | End: 2024-09-10
Attending: EMERGENCY MEDICINE | Admitting: INTERNAL MEDICINE
Payer: MEDICARE

## 2024-09-09 DIAGNOSIS — K94.23 GASTROSTOMY TUBE DYSFUNCTION: Primary | ICD-10-CM

## 2024-09-09 LAB
ACANTHOCYTES BLD QL SMEAR: NORMAL
ANION GAP SERPL CALCULATED.3IONS-SCNC: 9.7 MMOL/L (ref 5–15)
BASOPHILS # BLD AUTO: 0.02 10*3/MM3 (ref 0–0.2)
BASOPHILS NFR BLD AUTO: 0.5 % (ref 0–1.5)
BUN SERPL-MCNC: 17 MG/DL (ref 8–23)
BUN/CREAT SERPL: 21.5 (ref 7–25)
CALCIUM SPEC-SCNC: 9.8 MG/DL (ref 8.6–10.5)
CHLORIDE SERPL-SCNC: 102 MMOL/L (ref 98–107)
CO2 SERPL-SCNC: 27.3 MMOL/L (ref 22–29)
CREAT SERPL-MCNC: 0.79 MG/DL (ref 0.76–1.27)
DACRYOCYTES BLD QL SMEAR: NORMAL
DEPRECATED RDW RBC AUTO: 48 FL (ref 37–54)
EGFRCR SERPLBLD CKD-EPI 2021: 93.8 ML/MIN/1.73
EOSINOPHIL # BLD AUTO: 0.1 10*3/MM3 (ref 0–0.4)
EOSINOPHIL NFR BLD AUTO: 2.6 % (ref 0.3–6.2)
ERYTHROCYTE [DISTWIDTH] IN BLOOD BY AUTOMATED COUNT: 13.6 % (ref 12.3–15.4)
GLUCOSE SERPL-MCNC: 223 MG/DL (ref 65–99)
HCT VFR BLD AUTO: 40.4 % (ref 37.5–51)
HGB BLD-MCNC: 13.3 G/DL (ref 13–17.7)
IMM GRANULOCYTES # BLD AUTO: 0 10*3/MM3 (ref 0–0.05)
IMM GRANULOCYTES NFR BLD AUTO: 0 % (ref 0–0.5)
LYMPHOCYTES # BLD AUTO: 1.32 10*3/MM3 (ref 0.7–3.1)
LYMPHOCYTES NFR BLD AUTO: 34.7 % (ref 19.6–45.3)
MCH RBC QN AUTO: 31.2 PG (ref 26.6–33)
MCHC RBC AUTO-ENTMCNC: 32.9 G/DL (ref 31.5–35.7)
MCV RBC AUTO: 94.8 FL (ref 79–97)
MONOCYTES # BLD AUTO: 0.42 10*3/MM3 (ref 0.1–0.9)
MONOCYTES NFR BLD AUTO: 11.1 % (ref 5–12)
NEUTROPHILS NFR BLD AUTO: 1.94 10*3/MM3 (ref 1.7–7)
NEUTROPHILS NFR BLD AUTO: 51.1 % (ref 42.7–76)
NRBC BLD AUTO-RTO: 0 /100 WBC (ref 0–0.2)
PLATELET # BLD AUTO: 133 10*3/MM3 (ref 140–450)
PMV BLD AUTO: 11.2 FL (ref 6–12)
POIKILOCYTOSIS BLD QL SMEAR: NORMAL
POTASSIUM SERPL-SCNC: 3.9 MMOL/L (ref 3.5–5.2)
RBC # BLD AUTO: 4.26 10*6/MM3 (ref 4.14–5.8)
SMALL PLATELETS BLD QL SMEAR: ADEQUATE
SODIUM SERPL-SCNC: 139 MMOL/L (ref 136–145)
WBC MORPH BLD: NORMAL
WBC NRBC COR # BLD AUTO: 3.8 10*3/MM3 (ref 3.4–10.8)

## 2024-09-09 PROCEDURE — G0378 HOSPITAL OBSERVATION PER HR: HCPCS

## 2024-09-09 PROCEDURE — 85025 COMPLETE CBC W/AUTO DIFF WBC: CPT

## 2024-09-09 PROCEDURE — 85007 BL SMEAR W/DIFF WBC COUNT: CPT

## 2024-09-09 PROCEDURE — 80048 BASIC METABOLIC PNL TOTAL CA: CPT

## 2024-09-09 PROCEDURE — 99285 EMERGENCY DEPT VISIT HI MDM: CPT

## 2024-09-09 RX ORDER — GABAPENTIN 250 MG/5ML
SOLUTION ORAL
COMMUNITY
Start: 2024-09-04

## 2024-09-09 RX ORDER — FAMOTIDINE 20 MG/1
40 TABLET, FILM COATED ORAL DAILY
Status: DISCONTINUED | OUTPATIENT
Start: 2024-09-10 | End: 2024-09-10 | Stop reason: HOSPADM

## 2024-09-09 RX ORDER — SODIUM CHLORIDE 0.9 % (FLUSH) 0.9 %
10 SYRINGE (ML) INJECTION EVERY 12 HOURS SCHEDULED
Status: DISCONTINUED | OUTPATIENT
Start: 2024-09-09 | End: 2024-09-10 | Stop reason: HOSPADM

## 2024-09-09 RX ORDER — SODIUM CHLORIDE 0.9 % (FLUSH) 0.9 %
10 SYRINGE (ML) INJECTION AS NEEDED
Status: DISCONTINUED | OUTPATIENT
Start: 2024-09-09 | End: 2024-09-10 | Stop reason: HOSPADM

## 2024-09-09 RX ORDER — ONDANSETRON 2 MG/ML
4 INJECTION INTRAMUSCULAR; INTRAVENOUS EVERY 6 HOURS PRN
Status: DISCONTINUED | OUTPATIENT
Start: 2024-09-09 | End: 2024-09-10 | Stop reason: HOSPADM

## 2024-09-09 RX ORDER — SODIUM CHLORIDE 9 MG/ML
40 INJECTION, SOLUTION INTRAVENOUS AS NEEDED
Status: DISCONTINUED | OUTPATIENT
Start: 2024-09-09 | End: 2024-09-10 | Stop reason: HOSPADM

## 2024-09-09 RX ORDER — BISACODYL 5 MG/1
5 TABLET, DELAYED RELEASE ORAL DAILY PRN
Status: DISCONTINUED | OUTPATIENT
Start: 2024-09-09 | End: 2024-09-10 | Stop reason: HOSPADM

## 2024-09-09 RX ORDER — LAMOTRIGINE 25 MG/1
TABLET ORAL
COMMUNITY
Start: 2024-09-04

## 2024-09-09 RX ORDER — BISACODYL 10 MG
10 SUPPOSITORY, RECTAL RECTAL DAILY PRN
Status: DISCONTINUED | OUTPATIENT
Start: 2024-09-09 | End: 2024-09-10 | Stop reason: HOSPADM

## 2024-09-09 RX ORDER — POLYETHYLENE GLYCOL 3350 17 G/17G
17 POWDER, FOR SOLUTION ORAL DAILY PRN
Status: DISCONTINUED | OUTPATIENT
Start: 2024-09-09 | End: 2024-09-10 | Stop reason: HOSPADM

## 2024-09-09 RX ORDER — AMOXICILLIN 250 MG
2 CAPSULE ORAL 2 TIMES DAILY PRN
Status: DISCONTINUED | OUTPATIENT
Start: 2024-09-09 | End: 2024-09-10 | Stop reason: HOSPADM

## 2024-09-09 RX ADMIN — Medication 10 ML: at 22:56

## 2024-09-09 NOTE — ED PROVIDER NOTES
Subjective   History of Present Illness  74-year-old male presents after apparently accidentally cut his G-tube and off.  This had been placed back in June of this year.  He is having no other complaints  Review of Systems    Past Medical History:   Diagnosis Date    Ankylosing spondylitis of site in spine 1998    Spine surgery    Anxiety     Carotid artery occlusion     Cataracts, bilateral 04/2022    Cervical disc disorder     Chronic pain disorder     Coronary artery disease of native artery of native heart with stable angina pectoris 12/28/2023    Deep vein thrombosis 2018 approx    none since    Depression 6/19    Death of wife    Diabetes mellitus 2017 approx    Type 2    Headache Always    Heart attack 06/17/2021    Low back pain     Lumbosacral disc disease     Migraine Unknown    Mixed hyperlipidemia 03/08/2018    Neck pain Feb 2023    Peripheral neuropathy     Rheumatoid arthritis Unknown    Shingles     Spinal stenosis     Thoracic disc disorder     Trigeminal neuralgia     Ureteral stone with hydronephrosis 11/24/2016       Allergies   Allergen Reactions    Adhesive Tape Itching     tegraderm patches causes itching    Latex Unknown - High Severity    Methocarbamol Itching and Rash     Arms etc. No hives.       Pregabalin Confusion and Other (See Comments)     Hypnotic driving episode  Dizziness and inability to focus  Dizziness and inability to focus  Other reaction(s): Confusion  lyrica      Silver Rash       Past Surgical History:   Procedure Laterality Date    ANTERIOR CERVICAL DISCECTOMY W/ FUSION N/A 6/6/2024    Procedure: Cervical 3-5 anterior cervical discectomy and fusion;  Surgeon: Bang Mirza IV, MD;  Location: [facility] MAIN OR;  Service: Neurosurgery;  Laterality: N/A;    BACK SURGERY  2019    4 rods in back    BRAIN SURGERY      CARDIAC CATHETERIZATION  06/17/2021    CARDIAC CATHETERIZATION N/A 01/02/2024    Procedure: Left Heart Cath with angiogram;  Surgeon: Akin Brown MD;  Location:   [facility] CATH INVASIVE LOCATION;  Service: Cardiovascular;  Laterality: N/A;    CARDIAC CATHETERIZATION N/A 2024    Procedure: Left ventriculography;  Surgeon: Akin Brown MD;  Location: [facility] CATH INVASIVE LOCATION;  Service: Cardiovascular;  Laterality: N/A;    CAROTID STENT      CORONARY STENT PLACEMENT  2021    CRANIOTOMY      CYBERKNIFE      ENDOSCOPY      ENDOSCOPY W/ PEG TUBE PLACEMENT N/A 2024    Procedure: ESOPHAGOGASTRODUODENOSCOPY WITH PERCUTANEOUS ENDOSCOPIC GASTROSTOMY TUBE INSERTION;  Surgeon: Niranjan Rich MD;  Location: [facility] ENDOSCOPY;  Service: Gastroenterology;  Laterality: N/A;  post op:    EPIDURAL BLOCK      HERNIA REPAIR      KNEE ARTHROSCOPY      LAMINECTOMY      NECK SURGERY  Unknown    SPINAL CORD STIMULATOR IMPLANT  Previously    SPINAL FUSION  Unknown    SPINE SURGERY      TRIGEMINAL NERVE DECOMPRESSION      TRIGGER POINT INJECTION         Family History   Problem Relation Age of Onset    Diabetes Mother             Arthritis Mother     Heart attack Father             Aneurysm Father        Social History     Socioeconomic History    Marital status:    Tobacco Use    Smoking status: Never     Passive exposure: Never    Smokeless tobacco: Never   Vaping Use    Vaping status: Never Used   Substance and Sexual Activity    Alcohol use: Never     Alcohol/week: 4.0 standard drinks of alcohol     Comment: or less per week    Drug use: Never    Sexual activity: Never     Prior to Admission medications    Medication Sig Start Date End Date Taking? Authorizing Provider   aspirin 81 MG EC tablet Take 1 tablet by mouth Daily. 24   Erum Wilkinson PA-C   atorvastatin (LIPITOR) 80 MG tablet Take 1 tablet by mouth Daily.    ProviderHailey MD   B-D UF III MINI PEN NEEDLES 31G X 5 MM misc TEST EVERY DAY 10/3/22   La Mccormick MD   Lancets (freestyle) lancets FreeStyle Lancets    ProviderHailey MD   metoprolol succinate XL  (TOPROL-XL) 25 MG 24 hr tablet Take 1 tablet by mouth Daily. 2/5/24   Akin Brown MD   Toujeo SoloStar 300 UNIT/ML solution pen-injector injection Inject 30 Units under the skin into the appropriate area as directed Daily. Inject 25 units QHS  Patient taking differently: Inject 10 Units under the skin into the appropriate area as directed Daily. Inject 10 units QHS 8/1/22   La Mccormick MD             Objective   Physical Exam  74-year-old male awake alert.  He has G-tube in place.  The and is clamped and missing.  Procedures           ED Course                                             Medical Decision Making  Problems Addressed:  Attention to G-tube: acute illness or injury      Patient's findings were discussed with him.  Was going to replace with G-tube however he does not think our G-tube is compatible with the feeding attachment he has at home.  He does not have what he is using with him so there is no way for us to confirm this.  He was advised he would need to follow-up with the gastroenterologist to place this tube in the morning since we cannot confirm that the G-tube we have is compatible with his feeding system.  Final diagnoses:   Attention to G-tube       ED Disposition  ED Disposition       ED Disposition   Discharge    Condition   Stable    Comment   --               [facility]  [address] [address] [phone]             Medication List        Changed      Toujeo SoloStar 300 UNIT/ML solution pen-injector injection  Generic drug: Insulin Glargine (1 Unit Dial)  Inject 30 Units under the skin into the appropriate area as directed Daily. Inject 25 units QHS  What changed:   how much to take  additional instructions                 Feliberto Francis MD  09/08/24 1300

## 2024-09-10 ENCOUNTER — HOSPITAL ENCOUNTER (OUTPATIENT)
Dept: SPEECH THERAPY | Facility: HOSPITAL | Age: 74
Discharge: HOME | End: 2024-09-10
Admitting: FAMILY MEDICINE
Payer: MEDICARE

## 2024-09-10 VITALS
WEIGHT: 192.9 LBS | BODY MASS INDEX: 22.78 KG/M2 | OXYGEN SATURATION: 98 % | DIASTOLIC BLOOD PRESSURE: 77 MMHG | HEIGHT: 77 IN | HEART RATE: 90 BPM | SYSTOLIC BLOOD PRESSURE: 113 MMHG | TEMPERATURE: 97.5 F | RESPIRATION RATE: 17 BRPM

## 2024-09-10 LAB
GLUCOSE BLDC GLUCOMTR-MCNC: 174 MG/DL (ref 70–105)
GLUCOSE BLDC GLUCOMTR-MCNC: 223 MG/DL (ref 70–105)

## 2024-09-10 PROCEDURE — 82948 REAGENT STRIP/BLOOD GLUCOSE: CPT

## 2024-09-10 PROCEDURE — G0378 HOSPITAL OBSERVATION PER HR: HCPCS

## 2024-09-10 PROCEDURE — 92526 ORAL FUNCTION THERAPY: CPT

## 2024-09-10 RX ORDER — INSULIN GLARGINE 300 U/ML
10 INJECTION, SOLUTION SUBCUTANEOUS EVERY MORNING
Start: 2024-09-10 | End: 2024-09-11

## 2024-09-10 NOTE — CASE MANAGEMENT/SOCIAL WORK
Continued Stay Note   Amilcar     Patient Name: Gurinder Amezcua  MRN: [id number]  Today's Date: 9/10/2024    Admit Date: 9/9/2024    Plan: Patient discharged prior to CM assessment. Attempted assessment at 0940.              Discharge Codes    No documentation.                 Expected Discharge Date and Time       Expected Discharge Date Expected Discharge Time    Sep 10, 2024           Aisha Chauhan RN     Office: [phone]  Fax: [phone]

## 2024-09-10 NOTE — THERAPY TREATMENT NOTE
Outpatient Speech Language Pathology   Adult Swallow Treatment Note  CHRISTINA Fitzgerald     Patient Name: Gurinder Amezcua  : 1950  MRN: [id number]  Today's Date: 9/10/2024         Visit Date: 09/10/2024   Patient Active Problem List   Diagnosis    Type 2 diabetes mellitus with diabetic polyneuropathy, with long-term current use of insulin    Dyslipidemia    Acute deep vein thrombosis (DVT) of femoral vein of right lower extremity    Arthritis    Esophageal obstruction    Other esophagitis without bleeding    Cerebral infarction, unspecified    Chronic back pain    Chronic fatigue    Degenerative disc disease, lumbar    Diaphragmatic hernia without obstruction or gangrene    Dysphagia    Encounter for screening colonoscopy    Lumbar disc disease with radiculopathy    Lower leg DVT (deep venous thromboembolism), chronic, right    History of iron deficiency    History of DVT (deep vein thrombosis)    Herniated nucleus pulposus of lumbosacral region    Hearing loss    Gastro-esophageal reflux disease without esophagitis    Gastroesophageal reflux disease without esophagitis    Gastric foreign body    Ureteral stone with hydronephrosis    Trigeminal neuralgia    Recurrent kidney stones    Nephrolithiasis    Polyp of stomach and duodenum    Mixed hyperlipidemia    Major depressive disorder, single episode, unspecified    Lumbosacral spondylosis without myelopathy    Maxillary pain    Vitamin D deficiency    Type 2 diabetes mellitus with hyperglycemia    Coronary artery disease of native artery of native heart with stable angina pectoris    Abnormal nuclear stress test    Cervical myelopathy    S/P cervical spinal fusion    Feeding difficulties    Gastrostomy tube dysfunction        Subjective: Pt seen on this date to initiate outpatient dysphagia therapy services. Pt currently NPO w/ PEG feedings since 2024. Pt previously seen on 2024 for VFSS where it was recommended he remain NPO w/ PEG feedings and participate in  "dysphagia therapy targeting pharyngeal strengthening. Pt reports he has participated in ST services prior: 19 days inpatient rehab, 7 weeks home health 1x/week. Pt reports he begins cyber knife procedure on 09/11 for trigeminal nerve neuralgia. Pt also reports he recently was admitted to hospital to have GI change capping of PEG tube. During admission, MD discussed changing TF order d/t continuous weight loss. Pt also reports a doctor told him he had \"aphagia\" during an appointment this past week.      Objective: Home exercise program reviewed - pt reported he was only able to complete 3 days d/t PEG tube difficulties resulting in ER visits and admission into hospital to fix. New tracking sheet given to pt with exercises listed. Repetitions of exercises has been increased from 15-20 to 25-30 resulting in targeted 75-90 intentional swallows a day in conjunction with an exercise. Exercises completed during session: Effortful swallow x25 with water, Chin Tuck Against Resistance x25 with rolled washcloths. Pt with cough x2 and throat clear x1 with water trials. Pt able to complete with min cues reviewing how to complete. Pt did not have EMST with him during session, but reports he has been completing repetitions and resistance of 55 continues to be adequate. Pt agreed to bring EMST to next session. Pt continues to lean right when he swallows. Pt reports he continues to drink water and other liquids at home - recommendations and safe swallowing strategies reviewed - pt verbalized understanding. Pt reported he also tried to eat a hamburger, however it went poorly and he no longer feels the desire to eat food. Recommendations and rationale derived from recent VFSS reviewed with pt again - pt verbalized understanding.      Plan: Continue Intensive Dysphagia Rehabilitation (IDR) - pt comes for outpatient dysphagia therapy 1-2x/week, HEP for carryover and continuous practice of swallow/exercises. Repeat VFSS following 5 weeks " of IDR to gain instrumental assessment data of therapy effectiveness.  Pt agreeable to plan and reports he is very motivated to improve swallow. Pt scheduled for next appointment Thursday September 12, 2024.        LTG1: Patient will tolerate safest and least restrictive diet without complications from aspiration independently by participating in skilled dysphagia therapy targeting pharyngeal strengthening.      STG1: Patient will participate in dysphagia therapy targeting pharyngeal strengthening to improve pharyngeal swallow with minimal cues, including but not limited to: hard effortful swallow, supraglottic swallow, CTAR, sabi, EMST.      STG2: Pt will improve swallow function as noted by improvement with use of VFSS, patient reports, and increased oral intake with varied consistencies to improve overall quality of life.        Ora Otto, SLP  9/10/2024

## 2024-09-10 NOTE — DISCHARGE SUMMARY
Date of Discharge:  9/10/2024    Discharge Diagnosis: Gastrostomy tube dysfunction, type 2 diabetes, diabetic peripheral neuropathy, severe dysphagia    Presenting Problem/History of Present Illness  Active Hospital Problems    Diagnosis  POA    **Gastrostomy tube dysfunction [K94.23]  Yes      Resolved Hospital Problems   No resolved problems to display.          Hospital Course  Patient is a 74 y.o. male who is G-tube dependent for nutrition due to severe dysphagia who presented with malfunctioning G-tube for 2 days.  There was a piece on the end of his G-tube where the G-tube was accessed that had broken off.  GI service I directed him to the emergency room.  They saw him the morning after admission and were able to replace the part that was needed.  His PEG tube was then working fine and he requested prompt discharge as he was trying to get to his speech therapy appointment.  No medication changes were made.  He does report that he has had a 40 pound weight loss since starting G-tube feedings.  He is concerned that he is not getting enough calories.  [facility] is working with [facility] for [facility] needs and tube feedings.  I will discuss with PCP and consider increasing the volume of calories as he feels it is insufficient..      Procedures Performed         Consults:   Consults       No orders found for last 30 day(s).            Pertinent Test Results:    Lab Results (most recent)       Procedure Component Value Units Date/Time    POC Glucose Once [[id number]]  (Abnormal) Collected: 09/10/24 0620    Specimen: Blood Updated: 09/10/24 0622     Glucose 174 mg/dL      Comment: Serial Number: [id number]:  [id number]       POC Glucose Once [[id number]]  (Abnormal) Collected: 09/10/24 0045    Specimen: Blood Updated: 09/10/24 0047     Glucose 223 mg/dL      Comment: Serial Number: [id number]:  [id number]       Basic Metabolic Panel [[id number]]  (Abnormal) Collected: 09/09/24 2255    Specimen: Blood  Updated: 09/09/24 2330     Glucose 223 mg/dL      BUN 17 mg/dL      Creatinine 0.79 mg/dL      Sodium 139 mmol/L      Potassium 3.9 mmol/L      Chloride 102 mmol/L      CO2 27.3 mmol/L      Calcium 9.8 mg/dL      BUN/Creatinine Ratio 21.5     Anion Gap 9.7 mmol/L      eGFR 93.8 mL/min/1.73     Narrative:      GFR Normal >60  Chronic Kidney Disease <60  Kidney Failure <15    The GFR formula is only valid for adults with stable renal function between ages 18 and 70.    CBC & Differential [[id number]]  (Abnormal) Collected: 09/09/24 2255    Specimen: Blood Updated: 09/09/24 2324    Narrative:      The following orders were created for panel order CBC & Differential.  Procedure                               Abnormality         Status                     ---------                               -----------         ------                     CBC Auto Differential[[id number]]        Abnormal            Final result               Scan Slide[[id number]]                                       Final result                 Please view results for these tests on the individual orders.    CBC Auto Differential [[id number]]  (Abnormal) Collected: 09/09/24 2255    Specimen: Blood Updated: 09/09/24 2324     WBC 3.80 10*3/mm3      RBC 4.26 10*6/mm3      Hemoglobin 13.3 g/dL      Hematocrit 40.4 %      MCV 94.8 fL      MCH 31.2 pg      MCHC 32.9 g/dL      RDW 13.6 %      RDW-SD 48.0 fl      MPV 11.2 fL      Platelets 133 10*3/mm3      Neutrophil % 51.1 %      Lymphocyte % 34.7 %      Monocyte % 11.1 %      Eosinophil % 2.6 %      Basophil % 0.5 %      Immature Grans % 0.0 %      Neutrophils, Absolute 1.94 10*3/mm3      Lymphocytes, Absolute 1.32 10*3/mm3      Monocytes, Absolute 0.42 10*3/mm3      Eosinophils, Absolute 0.10 10*3/mm3      Basophils, Absolute 0.02 10*3/mm3      Immature Grans, Absolute 0.00 10*3/mm3      nRBC 0.0 /100 WBC     Scan Slide [[id number]] Collected: 09/09/24 2255    Specimen: Blood Updated: 09/09/24 2324      Acanthocytes Slight/1+     Dacrocytes Slight/1+     Poikilocytes Slight/1+     WBC Morphology Normal     Platelet Estimate Adequate                         Condition on Discharge: Stable    Vital Signs  Temp:  [97.5 °F (36.4 °C)-98 °F (36.7 °C)] 97.5 °F (36.4 °C)  Heart Rate:  [64-90] 90  Resp:  [17-20] 17  BP: ()/(60-88) 113/77    Physical Exam:     General Appearance:    Alert, cooperative, in no acute distress, hard of hearing   Head:    Normocephalic, without obvious abnormality, atraumatic   Eyes:            Lids and lashes normal, conjunctivae and sclerae normal, no   icterus, no pallor, corneas clear, PERRLA   Ears:    Ears appear intact with no abnormalities noted   Throat:   No oral lesions, no thrush, oral mucosa moist   Neck:   No adenopathy, supple, trachea midline, no thyromegaly, no   carotid bruit, no JVD   Lungs:     Clear to auscultation,respirations regular, even and                  unlabored    Heart:    Regular rhythm and normal rate, normal S1 and S2, no            murmur, no gallop, no rub, no click   Chest Wall:    No abnormalities observed   Abdomen:   Tube site is clean and dry,   Extremities:   Moves all extremities well, no edema, no cyanosis, no             redness   Pulses:   Pulses palpable and equal bilaterally   Skin:   No bleeding, bruising or rash   Lymph nodes:   No palpable adenopathy   Neurologic:   Cranial nerves 2 - 12 grossly intact, sensation intact, DTR       present and equal bilaterally       Discharge Disposition  Home or Self Care    Discharge Medications     Discharge Medications        Continue These Medications        Instructions Start Date   aspirin 81 MG EC tablet   81 mg, Oral, Daily      atorvastatin 80 MG tablet  Commonly known as: LIPITOR   80 mg, Oral, Daily      gabapentin 50 mg/mL solution  Commonly known as: NEURONTIN       lamoTRIgine 25 MG tablet  Commonly known as: LaMICtal   TAKE 1 TABLET BY MOUTH TWICE DAILY. INCREASE BY 1 TABLET WEEKLY TO 4  TABLETS 2 TIMES A DAY      metoprolol succinate XL 25 MG 24 hr tablet  Commonly known as: TOPROL-XL   25 mg, Oral, Daily      Toujeo SoloStar 300 UNIT/ML solution pen-injector injection  Generic drug: Insulin Glargine (1 Unit Dial)   10 Units, Subcutaneous, Every Morning               Discharge Diet:   Diet Instructions    Nothing to eat or drink.              Activity at Discharge:   Activity Instructions    Gradually increase activity until pre-hospital levels have been met           Follow-up Appointments  Future Appointments   Date Time Provider Department Center   9/11/2024  9:00 AM La Mccormick MD [facility]   9/12/2024 10:00 AM Ora Otto, SLP [facility]   9/17/2024  1:40 PM Akin Brown MD [facility]   10/7/2024 12:00 PM Bang Mirza IV, MD [facility]     Additional Instructions for the Follow-ups that You Need to Schedule       Discharge Follow-up with PCP   As directed       Currently Documented PCP:    Marissa Prasad MD    PCP Phone Number:    [phone]     Follow Up Details: Call office to schedule a follow up appointment                Test Results Pending at Discharge       Raina Jeffries MD  09/10/24  14:07 EDT    Time: Discharge 25 min

## 2024-09-10 NOTE — PROGRESS NOTES
GI called to change cap on G-tube that fell off.  Cap obtained from endoscopy and changed at bedside without difficulty.  Tube flushed freely.  Ok to discharge home.  Patient can contact our office in the future with G-tube issues.

## 2024-09-10 NOTE — H&P
Patient Care Team:  Marissa Prasad MD as PCP - General (Family Medicine)  Akin Brown MD as Consulting Physician (Cardiology)    Chief complaint G-tube check    Subjective     Patient is a 74 y.o. male who presented to the ER with reports that his feeding tube seems to be broken off that he noticed yesterday. Patient did state that he was seen here in the ER last night and the replacement part was unavailable so he was to call GI this morning. He states he called and they referred him back to the ER for replacement. He states he has not been able to do his feeding in two days now due to this. ER was unable to remove the old tube and will require GI for further management.     Onset of symptoms was 2 days    Review of Systems   All other systems reviewed and are negative.         History  Past Medical History:   Diagnosis Date    Ankylosing spondylitis of site in spine 1998    Spine surgery    Anxiety     Carotid artery occlusion     Cataracts, bilateral 04/2022    Cervical disc disorder     Chronic pain disorder     Coronary artery disease of native artery of native heart with stable angina pectoris 12/28/2023    Deep vein thrombosis 2018 approx    none since    Depression 6/19    Death of wife    Diabetes mellitus 2017 approx    Type 2    Headache Always    Heart attack 06/17/2021    Low back pain     Lumbosacral disc disease     Migraine Unknown    Mixed hyperlipidemia 03/08/2018    Neck pain Feb 2023    Peripheral neuropathy     Rheumatoid arthritis Unknown    Shingles     Spinal stenosis     Thoracic disc disorder     Trigeminal neuralgia     Ureteral stone with hydronephrosis 11/24/2016     Past Surgical History:   Procedure Laterality Date    ANTERIOR CERVICAL DISCECTOMY W/ FUSION N/A 6/6/2024    Procedure: Cervical 3-5 anterior cervical discectomy and fusion;  Surgeon: Bang Mirza IV, MD;  Location: [facility] MAIN OR;  Service: Neurosurgery;  Laterality: N/A;    BACK SURGERY  2019    4 rods in back     BRAIN SURGERY      CARDIAC CATHETERIZATION  2021    CARDIAC CATHETERIZATION N/A 2024    Procedure: Left Heart Cath with angiogram;  Surgeon: Akin Brown MD;  Location: [facility] CATH INVASIVE LOCATION;  Service: Cardiovascular;  Laterality: N/A;    CARDIAC CATHETERIZATION N/A 2024    Procedure: Left ventriculography;  Surgeon: Akin Brown MD;  Location: [facility] CATH INVASIVE LOCATION;  Service: Cardiovascular;  Laterality: N/A;    CAROTID STENT      CORONARY STENT PLACEMENT  2021    CRANIOTOMY      CYBERKNIFE      ENDOSCOPY      ENDOSCOPY W/ PEG TUBE PLACEMENT N/A 2024    Procedure: ESOPHAGOGASTRODUODENOSCOPY WITH PERCUTANEOUS ENDOSCOPIC GASTROSTOMY TUBE INSERTION;  Surgeon: Niranjan Rich MD;  Location: [facility] ENDOSCOPY;  Service: Gastroenterology;  Laterality: N/A;  post op:    EPIDURAL BLOCK      HERNIA REPAIR      KNEE ARTHROSCOPY      LAMINECTOMY      NECK SURGERY  Unknown    SPINAL CORD STIMULATOR IMPLANT  Previously    SPINAL FUSION  Unknown    SPINE SURGERY      TRIGEMINAL NERVE DECOMPRESSION      TRIGGER POINT INJECTION       Family History   Problem Relation Age of Onset    Diabetes Mother             Arthritis Mother     Heart attack Father             Aneurysm Father      Social History     Tobacco Use    Smoking status: Never     Passive exposure: Never    Smokeless tobacco: Never   Vaping Use    Vaping status: Never Used   Substance Use Topics    Alcohol use: Never     Alcohol/week: 4.0 standard drinks of alcohol     Comment: or less per week    Drug use: Never     (Not in a hospital admission)    Allergies:  Adhesive tape, Latex, Methocarbamol, Pregabalin, and Silver    Objective     Vital Signs  Temp:  [97.6 °F (36.4 °C)] 97.6 °F (36.4 °C)  Heart Rate:  [64-86] 72  Resp:  [20] 20  BP: (121-141)/(77-88) 127/81     Physical Exam:      General Appearance:    Alert, cooperative, in no acute distress   Head:    Normocephalic, without obvious  abnormality, atraumatic   Eyes:            Lids and lashes normal, conjunctivae and sclerae normal, no   icterus, no pallor, corneas clear, PERRLA   Ears:    Ears appear intact with no abnormalities noted   Throat:   No oral lesions, no thrush, oral mucosa moist   Neck:   No adenopathy, supple, trachea midline, no thyromegaly, no   carotid bruit, no JVD   Lungs:     Clear to auscultation,respirations regular, even and                  unlabored    Heart:    Regular rhythm and normal rate, normal S1 and S2, no            murmur, no gallop, no rub, no click   Chest Wall:    No abnormalities observed   Abdomen:     Normal bowel sounds, no masses, no organomegaly, soft        non-tender, non-distended, no guarding, no rebound                tenderness   Extremities:   Moves all extremities well, no edema, no cyanosis, no             redness   Pulses:   Pulses palpable and equal bilaterally   Skin:   No bleeding, bruising or rash   Lymph nodes:   No palpable adenopathy   Neurologic:   No focal deficits noted       Results Review:     Imaging Results (Last 24 Hours)       ** No results found for the last 24 hours. **             Lab Results (last 24 hours)       ** No results found for the last 24 hours. **             I reviewed the patient's new clinical results.    Assessment & Plan     Gastrostomy tube dysfunction  -GI consulted  -has been unable to complete feedings for 2 days  -keep npo after MN for potential need of surgical replacement    DVT prophylaxis- Scd's  GI prophylaxis- ppi    I discussed the patient's findings and my recommendations with patient.     Carrie Hidalgo, APRN  09/09/24  21:53 EDT

## 2024-09-10 NOTE — PLAN OF CARE
Goal Outcome Evaluation: Pt admitted this past evening with issues with Gtube. GI consult. Pt remains NPO and is unable to swallow.   Gtube was placed for this reason.  Pt up in room amb with cane. Plan of care ongoing

## 2024-09-10 NOTE — ED PROVIDER NOTES
Subjective   Chief Complaint   Patient presents with    Gtube Problem     Pt reports end/port of feeding tube came off yesterday, pt reports was seen here last night for this but reports they didn't have a replacement part and to call GI in morning. Pt reports he called GI and they told him to come back to ED for replacement       History of Present Illness  Patient is a 74-year-old male presents the ED for evaluation of G-tube problem.  He reports that the end part of his feeding tube broke off yesterday, he was unable to be replaced last night, so he was referred back to the ED today by gastroenterology.  Review of Systems  Per HPI  Past Medical History:   Diagnosis Date    Ankylosing spondylitis of site in spine 1998    Spine surgery    Anxiety     Carotid artery occlusion     Cataracts, bilateral 04/2022    Cervical disc disorder     Chronic pain disorder     Coronary artery disease of native artery of native heart with stable angina pectoris 12/28/2023    Deep vein thrombosis 2018 approx    none since    Depression 6/19    Death of wife    Diabetes mellitus 2017 approx    Type 2    Headache Always    Heart attack 06/17/2021    Low back pain     Lumbosacral disc disease     Migraine Unknown    Mixed hyperlipidemia 03/08/2018    Neck pain Feb 2023    Peripheral neuropathy     Rheumatoid arthritis Unknown    Shingles     Spinal stenosis     Thoracic disc disorder     Trigeminal neuralgia     Ureteral stone with hydronephrosis 11/24/2016       Allergies   Allergen Reactions    Adhesive Tape Itching     tegraderm patches causes itching    Latex Unknown - High Severity    Methocarbamol Itching and Rash     Arms etc. No hives.       Pregabalin Confusion and Other (See Comments)     Hypnotic driving episode  Dizziness and inability to focus  Dizziness and inability to focus  Other reaction(s): Confusion  lyrica      Silver Rash       Past Surgical History:   Procedure Laterality Date    ANTERIOR CERVICAL DISCECTOMY W/  FUSION N/A 2024    Procedure: Cervical 3-5 anterior cervical discectomy and fusion;  Surgeon: Bang Mirza IV, MD;  Location: [facility] MAIN OR;  Service: Neurosurgery;  Laterality: N/A;    BACK SURGERY      4 rods in back    BRAIN SURGERY      CARDIAC CATHETERIZATION  2021    CARDIAC CATHETERIZATION N/A 2024    Procedure: Left Heart Cath with angiogram;  Surgeon: Akin Brown MD;  Location: [facility] CATH INVASIVE LOCATION;  Service: Cardiovascular;  Laterality: N/A;    CARDIAC CATHETERIZATION N/A 2024    Procedure: Left ventriculography;  Surgeon: Akin Brown MD;  Location: [facility] CATH INVASIVE LOCATION;  Service: Cardiovascular;  Laterality: N/A;    CAROTID STENT      CORONARY STENT PLACEMENT  2021    CRANIOTOMY      CYBERKNIFE      ENDOSCOPY      ENDOSCOPY W/ PEG TUBE PLACEMENT N/A 2024    Procedure: ESOPHAGOGASTRODUODENOSCOPY WITH PERCUTANEOUS ENDOSCOPIC GASTROSTOMY TUBE INSERTION;  Surgeon: Niranjan Rich MD;  Location: [facility] ENDOSCOPY;  Service: Gastroenterology;  Laterality: N/A;  post op:    EPIDURAL BLOCK      HERNIA REPAIR      KNEE ARTHROSCOPY      LAMINECTOMY      NECK SURGERY  Unknown    SPINAL CORD STIMULATOR IMPLANT  Previously    SPINAL FUSION  Unknown    SPINE SURGERY      TRIGEMINAL NERVE DECOMPRESSION      TRIGGER POINT INJECTION         Family History   Problem Relation Age of Onset    Diabetes Mother             Arthritis Mother     Heart attack Father             Aneurysm Father        Social History     Socioeconomic History    Marital status:    Tobacco Use    Smoking status: Never     Passive exposure: Never    Smokeless tobacco: Never   Vaping Use    Vaping status: Never Used   Substance and Sexual Activity    Alcohol use: Never     Alcohol/week: 4.0 standard drinks of alcohol     Comment: or less per week    Drug use: Never    Sexual activity: Never           Objective   Physical Exam  Vitals and nursing note reviewed.    Constitutional:       Appearance: Normal appearance.   HENT:      Head: Normocephalic and atraumatic.      Mouth/Throat:      Mouth: Mucous membranes are moist.      Pharynx: Oropharynx is clear.   Eyes:      Extraocular Movements: Extraocular movements intact.      Conjunctiva/sclera: Conjunctivae normal.      Pupils: Pupils are equal, round, and reactive to light.   Cardiovascular:      Rate and Rhythm: Normal rate and regular rhythm.      Heart sounds: Normal heart sounds. No murmur heard.     No friction rub. No gallop.   Pulmonary:      Effort: Pulmonary effort is normal.      Breath sounds: Normal breath sounds.   Abdominal:          Comments: No tenderness guarding or rebound.  No peritoneal signs   Skin:     General: Skin is warm and dry.      Capillary Refill: Capillary refill takes less than 2 seconds.   Neurological:      Mental Status: He is alert and oriented to person, place, and time.         Procedures           ED Course  ED Course as of 09/09/24 2106   Mon Sep 09, 2024   2046 Dr. Champagne at bedside.  [LB]      ED Course User Index  [LB] Michelle Mccall APRN                                             Medical Decision Making  Problems Addressed:  Gastrostomy tube dysfunction: acute illness or injury    Risk  Decision regarding hospitalization.    Chart Review: Operative note June 2024 for G-tube placement  Patient 74-year-old male with G-tube.  He reports his G-tube broke off, he is unable to been doing his feedings with past 2 days.  Was referred back to the ED today after being seen last night for similar and unable to replace G-tube.  I am unable to remove his old tube due to significant resistance and tried to remove the tube and replace ED attending also attempted    At this point will admit to family medicine for GI consult NG tube replaced    Disposition: I discussed with the patient their test results, work-up here in the emergency department, and need for admission and further  evaluation. Patient is agreeable to the plan of care. At time of disposition patient's VS are reviewed, and patient without acute distress.  Opportunity was provided for questions at the bedside, all questions and concerns were addressed.  Note Disclaimer: At [facility], we believe that sharing information builds trust and better relationships. You are receiving this note because you recently visited [facility]. It is possible you will see health information before a provider has talked with you about it. This kind of information can be easy to misunderstand. To help you fully understand what it means for your health, we urge you to discuss this note with your provider  Note dictated utilizing Dragon Dictation.  Appropriate PPE worn during patient interactions.        Final diagnoses:   Gastrostomy tube dysfunction       ED Disposition  ED Disposition       ED Disposition   Decision to Admit    Condition   --    Comment   Level of Care: Med/Surg [1]   Admitting Physician: VEE MCALLISTER [[id number]]   Attending Physician: VEE MCALLISTER [[id number]]                 No follow-up provider specified.       Medication List      No changes were made to your prescriptions during this visit.            Michelle Mccall, SHAWN  09/09/24 2105       Michelle Mccall, SHAWN  09/09/24 2106

## 2024-09-11 ENCOUNTER — OFFICE VISIT (OUTPATIENT)
Dept: ENDOCRINOLOGY | Facility: CLINIC | Age: 74
End: 2024-09-11
Payer: COMMERCIAL

## 2024-09-11 ENCOUNTER — PATIENT ROUNDING (BHMG ONLY) (OUTPATIENT)
Dept: ENDOCRINOLOGY | Facility: CLINIC | Age: 74
End: 2024-09-11
Payer: MEDICARE

## 2024-09-11 VITALS
SYSTOLIC BLOOD PRESSURE: 115 MMHG | OXYGEN SATURATION: 99 % | DIASTOLIC BLOOD PRESSURE: 72 MMHG | HEIGHT: 77 IN | WEIGHT: 193 LBS | HEART RATE: 98 BPM | BODY MASS INDEX: 22.79 KG/M2

## 2024-09-11 DIAGNOSIS — E78.2 MIXED HYPERLIPIDEMIA: ICD-10-CM

## 2024-09-11 DIAGNOSIS — E11.42 TYPE 2 DIABETES MELLITUS WITH DIABETIC POLYNEUROPATHY, WITH LONG-TERM CURRENT USE OF INSULIN: Primary | ICD-10-CM

## 2024-09-11 DIAGNOSIS — Z79.4 TYPE 2 DIABETES MELLITUS WITH DIABETIC POLYNEUROPATHY, WITH LONG-TERM CURRENT USE OF INSULIN: Primary | ICD-10-CM

## 2024-09-11 DIAGNOSIS — E55.9 VITAMIN D DEFICIENCY: ICD-10-CM

## 2024-09-11 PROCEDURE — 99214 OFFICE O/P EST MOD 30 MIN: CPT | Performed by: INTERNAL MEDICINE

## 2024-09-11 RX ORDER — INSULIN GLARGINE 300 U/ML
12 INJECTION, SOLUTION SUBCUTANEOUS EVERY MORNING
Start: 2024-09-11

## 2024-09-11 NOTE — PROGRESS NOTES
September 11, 2024    Hello, may I speak with Gurinder Amezcua?    My name is cristian      I am  with [facility]  [address] [place] [id number].    Before we get started may I verify your date of birth? 1950    I am calling to officially welcome you to our practice and ask about your recent visit. Is this a good time to talk? yes    Tell me about your visit with us. What things went well?  it went better than I had expected it to go. She is very nice and was very patient with me. I appreciated that.        We're always looking for ways to make our patients' experiences even better. Do you have recommendations on ways we may improve?  no    Overall were you satisfied with your first visit to our practice? yes       I appreciate you taking the time to speak with me today. Is there anything else I can do for you? no      Thank you, and have a great day.

## 2024-09-11 NOTE — PATIENT INSTRUCTIONS
Do PT home exercises.  Increase Toujeo to 12 units daily.  Continue atorvastatin.  Call if blood sugars are running under 100 or over 200.  F/u in 6 months, with fasting labs prior.

## 2024-09-12 ENCOUNTER — HOSPITAL ENCOUNTER (OUTPATIENT)
Dept: SPEECH THERAPY | Facility: HOSPITAL | Age: 74
Discharge: HOME | End: 2024-09-12
Admitting: FAMILY MEDICINE
Payer: MEDICARE

## 2024-09-12 PROCEDURE — 92526 ORAL FUNCTION THERAPY: CPT

## 2024-09-12 NOTE — THERAPY TREATMENT NOTE
Outpatient Speech Language Pathology   Adult Swallow Treatment Note  CHRISTINA Fitzgerald     Patient Name: Gurinder Amezcua  : 1950  MRN: [id number]  Today's Date: 2024         Visit Date: 2024   Patient Active Problem List   Diagnosis    Type 2 diabetes mellitus with diabetic polyneuropathy, with long-term current use of insulin    Dyslipidemia    Acute deep vein thrombosis (DVT) of femoral vein of right lower extremity    Arthritis    Esophageal obstruction    Other esophagitis without bleeding    Cerebral infarction, unspecified    Chronic back pain    Chronic fatigue    Degenerative disc disease, lumbar    Diaphragmatic hernia without obstruction or gangrene    Dysphagia    Encounter for screening colonoscopy    Lumbar disc disease with radiculopathy    Lower leg DVT (deep venous thromboembolism), chronic, right    History of iron deficiency    History of DVT (deep vein thrombosis)    Herniated nucleus pulposus of lumbosacral region    Hearing loss    Gastro-esophageal reflux disease without esophagitis    Gastroesophageal reflux disease without esophagitis    Gastric foreign body    Ureteral stone with hydronephrosis    Trigeminal neuralgia    Recurrent kidney stones    Nephrolithiasis    Polyp of stomach and duodenum    Mixed hyperlipidemia    Major depressive disorder, single episode, unspecified    Lumbosacral spondylosis without myelopathy    Maxillary pain    Vitamin D deficiency    Type 2 diabetes mellitus with hyperglycemia    Coronary artery disease of native artery of native heart with stable angina pectoris    Abnormal nuclear stress test    Cervical myelopathy    S/P cervical spinal fusion    Feeding difficulties    Gastrostomy tube dysfunction     Subjective: Pt seen on this date to continue outpatient dysphagia therapy. Pt currently NPO w/ PEG feedings since 2024. Pt previously seen on 2024 for VFSS where it was recommended he remain NPO w/ PEG feedings and participate in dysphagia  Spoke with Heide regarding her IV medication. Patient expressed understanding of treatment plan.    therapy targeting pharyngeal strengthening. Pt reports he has participated in ST services prior: 19 days inpatient rehab, 7 weeks home health 1x/week. Pt reports he had cyber knife procedure on 09/11 for trigeminal nerve neuralgia, results will take 4-12 weeks. Pt recently had PEG cap changed, he reports that the cap leaks during feedings. Pt still losing weight - plans to discuss this with PCP on 09/25.     Objective: Pt completed EMST resistance 55 x10 - pt requested resistance remain 55 until next meeting. Pt able to independently complete EMST - however needed reminder to take break in between breaths. Super Supraglottic Swallow completed x25, Effortful Swallow x15, and CTAR x25. Pt reports with recent increase in reps he notices he is fatiguing towards the end. Pt encouraged to complete 25-30 reps per exercise both in session and at home. Pt able to complete exercises with min cues. Pt completed exercises with dry swallow and water by cup. Neuromuscular Electrical Stimulation (ESTIM/NMES) and sEMG treatment option discussed with pt. Pt interested in pursuing treatment if safe with nerve neuralgia and steel plate. Home exercise program reviewed and encouraged - pt verbalized understanding.      Plan: Continue Intensive Dysphagia Rehabilitation (IDR) - pt comes for outpatient dysphagia therapy 1-2x/week, HEP for carryover and continuous practice of swallow/exercises. Increasing repeat VFSS to following 6-7 weeks of IDR to gain instrumental assessment data of therapy effectiveness due to pt external factors impeding ability to complete recs past week. SLP to reach out to PCP -Shanice- to determine if NMES is a viable treatment for pt. Pt agreeable to plan and reports he is very motivated to improve swallow.  Pt scheduled for next appointment Thursday September 18, 2024.        LTG1: Patient will tolerate safest and least restrictive diet without complications from aspiration independently by participating in  skilled dysphagia therapy targeting pharyngeal strengthening.      STG1: Patient will participate in dysphagia therapy targeting pharyngeal strengthening to improve pharyngeal swallow with minimal cues, including but not limited to: hard effortful swallow, supraglottic swallow, CTAR, sabi, EMST.      STG2: Pt will improve swallow function as noted by improvement with use of VFSS, patient reports, and increased oral intake with varied consistencies to improve overall quality of life.           Ora Otto, SLP  9/12/2024
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
## 2024-09-17 ENCOUNTER — OFFICE VISIT (OUTPATIENT)
Dept: CARDIOLOGY | Facility: CLINIC | Age: 74
End: 2024-09-17
Payer: MEDICARE

## 2024-09-17 VITALS
BODY MASS INDEX: 27.23 KG/M2 | DIASTOLIC BLOOD PRESSURE: 71 MMHG | HEART RATE: 79 BPM | SYSTOLIC BLOOD PRESSURE: 111 MMHG | WEIGHT: 194.5 LBS | OXYGEN SATURATION: 97 % | HEIGHT: 71 IN

## 2024-09-17 DIAGNOSIS — E78.00 PURE HYPERCHOLESTEROLEMIA: ICD-10-CM

## 2024-09-17 DIAGNOSIS — Z79.4 TYPE 2 DIABETES MELLITUS WITH DIABETIC POLYNEUROPATHY, WITH LONG-TERM CURRENT USE OF INSULIN: ICD-10-CM

## 2024-09-17 DIAGNOSIS — I25.118 CORONARY ARTERY DISEASE OF NATIVE ARTERY OF NATIVE HEART WITH STABLE ANGINA PECTORIS: Primary | ICD-10-CM

## 2024-09-17 DIAGNOSIS — E11.42 TYPE 2 DIABETES MELLITUS WITH DIABETIC POLYNEUROPATHY, WITH LONG-TERM CURRENT USE OF INSULIN: ICD-10-CM

## 2024-09-17 DIAGNOSIS — I10 PRIMARY HYPERTENSION: ICD-10-CM

## 2024-09-17 PROCEDURE — 99214 OFFICE O/P EST MOD 30 MIN: CPT | Performed by: INTERNAL MEDICINE

## 2024-09-17 PROCEDURE — 1159F MED LIST DOCD IN RCRD: CPT | Performed by: INTERNAL MEDICINE

## 2024-09-17 PROCEDURE — 1160F RVW MEDS BY RX/DR IN RCRD: CPT | Performed by: INTERNAL MEDICINE

## 2024-09-17 RX ORDER — METOPROLOL SUCCINATE 25 MG/1
25 TABLET, EXTENDED RELEASE ORAL DAILY
Qty: 90 TABLET | Refills: 3 | Status: SHIPPED | OUTPATIENT
Start: 2024-09-17

## 2024-09-17 RX ORDER — INSULIN GLARGINE 300 U/ML
INJECTION, SOLUTION SUBCUTANEOUS
Qty: 3 ML | Refills: 6 | Status: SHIPPED | OUTPATIENT
Start: 2024-09-17

## 2024-09-18 ENCOUNTER — HOSPITAL ENCOUNTER (OUTPATIENT)
Dept: SPEECH THERAPY | Facility: HOSPITAL | Age: 74
Discharge: HOME | End: 2024-09-18
Admitting: FAMILY MEDICINE
Payer: MEDICARE

## 2024-09-18 PROCEDURE — 92526 ORAL FUNCTION THERAPY: CPT

## 2024-09-23 ENCOUNTER — HOSPITAL ENCOUNTER (OUTPATIENT)
Dept: SPEECH THERAPY | Facility: HOSPITAL | Age: 74
Discharge: HOME | End: 2024-09-23
Admitting: FAMILY MEDICINE
Payer: MEDICARE

## 2024-09-23 PROCEDURE — 92526 ORAL FUNCTION THERAPY: CPT

## 2024-09-26 ENCOUNTER — HOSPITAL ENCOUNTER (OUTPATIENT)
Dept: SPEECH THERAPY | Facility: HOSPITAL | Age: 74
Discharge: HOME | End: 2024-09-26
Payer: MEDICARE

## 2024-09-26 PROCEDURE — 92526 ORAL FUNCTION THERAPY: CPT

## 2024-10-01 ENCOUNTER — HOSPITAL ENCOUNTER (OUTPATIENT)
Dept: SPEECH THERAPY | Facility: HOSPITAL | Age: 74
Discharge: HOME | End: 2024-10-01
Admitting: FAMILY MEDICINE
Payer: MEDICARE

## 2024-10-01 PROCEDURE — 92526 ORAL FUNCTION THERAPY: CPT

## 2024-10-01 NOTE — THERAPY TREATMENT NOTE
Outpatient Speech Language Pathology   Adult Swallow Treatment Note  CHRISTINA Fitzgerald     Patient Name: Gurinder Amezcua  : 1950  MRN: [id number]  Today's Date: 10/1/2024         Visit Date: 10/01/2024   Patient Active Problem List   Diagnosis    Type 2 diabetes mellitus with diabetic polyneuropathy, with long-term current use of insulin    Dyslipidemia    Acute deep vein thrombosis (DVT) of femoral vein of right lower extremity    Arthritis    Esophageal obstruction    Other esophagitis without bleeding    Cerebral infarction, unspecified    Chronic back pain    Chronic fatigue    Degenerative disc disease, lumbar    Diaphragmatic hernia without obstruction or gangrene    Dysphagia    Encounter for screening colonoscopy    Lumbar disc disease with radiculopathy    Lower leg DVT (deep venous thromboembolism), chronic, right    History of iron deficiency    History of DVT (deep vein thrombosis)    Herniated nucleus pulposus of lumbosacral region    Hearing loss    Gastro-esophageal reflux disease without esophagitis    Gastroesophageal reflux disease without esophagitis    Gastric foreign body    Ureteral stone with hydronephrosis    Trigeminal neuralgia    Recurrent kidney stones    Nephrolithiasis    Polyp of stomach and duodenum    Mixed hyperlipidemia    Major depressive disorder, single episode, unspecified    Lumbosacral spondylosis without myelopathy    Maxillary pain    Vitamin D deficiency    Type 2 diabetes mellitus with hyperglycemia    Coronary artery disease of native artery of native heart with stable angina pectoris    Abnormal nuclear stress test    Cervical myelopathy    S/P cervical spinal fusion    Feeding difficulties    Gastrostomy tube dysfunction     Subjective: Pt seen on this date to continue outpatient dysphagia therapy. Pt currently NPO w/ PEG feedings since 2024. Pt previously seen on 2024 for VFSS where it was recommended he remain NPO w/ PEG feedings and participate in dysphagia  therapy targeting pharyngeal strengthening. SLP reached out to PCP for possible nutrition consult per pt concerns.   Objective: Continuation of NMES on this date. Suprahyoid muscle activation targeted due to role in hyolaryngeal elevation to promote epiglottic inversion and airway protection. Pt completed 30 minutes of swallowing therapy in conjunction with Neuromuscular Electrical Stimulation (NMES) applied to the suprahyoid muscles in the following intervals: 10 minutes: 15 mA, 10 minutes: 18mA, 10 minutes: 20 mA on A1 setting. Pt completed NMES with functional swallowing task of effortful swallow - both dry and with small sips of water. Pt completed 120 swallows total. Pt with no cough throughout. Pt reports he continues to follow home exercise program daily with recommended exercises - Effortful swallow, supraglottic swallow, and CTAR. Pt brought in EMST-75, pt able to upgrade to EMST-150 set at resistance 75.   Plan: Continue Intensive Dysphagia Rehabilitation (IDR) - pt comes for outpatient dysphagia therapy 1-2x/week, HEP for carryover and continuous practice of swallow/exercises. Continue NMES. SLP to reach out to nutrition/dietitian per pt concerns. Pt scheduled for next appointment Thursday October 3, 2024        LTG1: Patient will tolerate safest and least restrictive diet without complications from aspiration independently by participating in skilled dysphagia therapy targeting pharyngeal strengthening.      STG1: Patient will participate in dysphagia therapy targeting pharyngeal strengthening to improve pharyngeal swallow with minimal cues, including but not limited to: hard effortful swallow, supraglottic swallow, CTAR, sabi, EMST.      STG2: Pt will improve swallow function as noted by improvement with use of VFSS, patient reports, and increased oral intake with varied consistencies to improve overall quality of life.     STG3: Pt to participate in NMES placed on the suprahyoid muscles paired with  functional swallowing activities to decrease risk of aspiration and choking.       Ora Otto, SLP  10/1/2024

## 2024-10-03 ENCOUNTER — HOSPITAL ENCOUNTER (OUTPATIENT)
Dept: SPEECH THERAPY | Facility: HOSPITAL | Age: 74
Discharge: HOME | End: 2024-10-03
Payer: MEDICARE

## 2024-10-03 PROCEDURE — 92526 ORAL FUNCTION THERAPY: CPT

## 2024-10-03 NOTE — THERAPY TREATMENT NOTE
Outpatient Speech Language Pathology   Adult Swallow Progress Note  CHRISTINA Fitzgerald     Patient Name: Gurinder Amezcua  : 1950  MRN: [id number]  Today's Date: 10/3/2024         Visit Date: 10/03/2024   Patient Active Problem List   Diagnosis    Type 2 diabetes mellitus with diabetic polyneuropathy, with long-term current use of insulin    Dyslipidemia    Acute deep vein thrombosis (DVT) of femoral vein of right lower extremity    Arthritis    Esophageal obstruction    Other esophagitis without bleeding    Cerebral infarction, unspecified    Chronic back pain    Chronic fatigue    Degenerative disc disease, lumbar    Diaphragmatic hernia without obstruction or gangrene    Dysphagia    Encounter for screening colonoscopy    Lumbar disc disease with radiculopathy    Lower leg DVT (deep venous thromboembolism), chronic, right    History of iron deficiency    History of DVT (deep vein thrombosis)    Herniated nucleus pulposus of lumbosacral region    Hearing loss    Gastro-esophageal reflux disease without esophagitis    Gastroesophageal reflux disease without esophagitis    Gastric foreign body    Ureteral stone with hydronephrosis    Trigeminal neuralgia    Recurrent kidney stones    Nephrolithiasis    Polyp of stomach and duodenum    Mixed hyperlipidemia    Major depressive disorder, single episode, unspecified    Lumbosacral spondylosis without myelopathy    Maxillary pain    Vitamin D deficiency    Type 2 diabetes mellitus with hyperglycemia    Coronary artery disease of native artery of native heart with stable angina pectoris    Abnormal nuclear stress test    Cervical myelopathy    S/P cervical spinal fusion    Feeding difficulties    Gastrostomy tube dysfunction        Progress: Pt seen on this date for continuation of outpatient dysphagia therapy. Pt began dysphagia therapy on 2024 and began Neuromuscular Electrical Stimulation (NMES) on 2024. Application of sEMG to the suprahyoid muscles revealed  308 microvolts of muscle activation on 09/18. Reapplication on 10/03/2024 revealed 550 microvolts of activation (242 increase.) Pt initiated and has continued a Home Exercise Program with dysphagia therapy exercises including Effortful Swallow, Supraglottic Swallow, and CTAR. Repetitions have been increased from 15-20/reps 3x/day to 30/reps 3x/day. Pt started on EMST-75 during first appointment at 45 resistance. Pt has increased EMST to 75 resistance and been provided new EMST-150.     Objective: Continuation of NMES on this date. Suprahyoid muscle activation targeted due to role in hyolaryngeal elevation to promote epiglottic inversion and airway protection. Pt completed 30 minutes of swallowing therapy in conjunction with Neuromuscular Electrical Stimulation (NMES) applied to the suprahyoid muscles in the following intervals: 13 minutes 18 mA, 17 minutes 20 mA. Pt completed NMES with functional swallowing task of effortful swallow - both dry and with small sips of water. Pt completed 120 swallows total. Pt with throat clear x2. Pt home exercise program repetitions increased to 30 reps 3x/day. EMST-150 set to resistance 75 continues to be adequate for pt.     Plan: Continue Intensive Dysphagia Rehabilitation (IDR) - pt comes for outpatient dysphagia therapy 1-2x/week, HEP for carryover and continuous practice of swallow/exercises. Continue NMES. SLP to reach out to nutrition/dietitian per pt concerns. Pt scheduled for next appointment Tuesday October 15, 2024        LTG1: Patient will tolerate safest and least restrictive diet without complications from aspiration independently by participating in skilled dysphagia therapy targeting pharyngeal strengthening.    Progress - Ongoing. See below.      STG1: Patient will participate in dysphagia therapy targeting pharyngeal strengthening to improve pharyngeal swallow with minimal cues, including but not limited to: hard effortful swallow, supraglottic swallow, CTAR,  EAN celestin.    Progress - Ongoing. Pt participating in pharyngeal strengthening exercises with HEP and during sessions in conjunction with NMES. Pt able to do independently at this time, requiring no cues for accurate execution of exercises.      STG2: Pt will improve swallow function as noted by improvement with use of VFSS, patient reports, and increased oral intake with varied consistencies to improve overall quality of life.   Progress - Ongoing. Repeat instrumental assessment will follow 10 session of NMES and continuation of HEP.      STG3: Pt to participate in NMES placed on the suprahyoid muscles paired with functional swallowing activities to decrease risk of aspiration and choking.    Progress - Ongoing. Pt has completed 5/10 sessions with good tolerance and completion NMES during sessions.         Ora Otto, SLP  10/3/2024

## 2024-10-04 NOTE — PROGRESS NOTES
"Subjective   History of Present Illness: Gurinder Amezcua is a 74 y.o. male is here today for follow-up on Neck.  He continues working with speech therapy but has not seen improvement in his dysphagia and still is requiring a feeding tube.  No other issues since he was last seen          Previous treatment: PO PT, speech therapy     Previous neurosurgery:   C3-5 ACDF 6/6/24; PEG tube placement on 6/11/2024     Previous injections:     The following portions of the patient's history were reviewed and updated as appropriate: allergies, current medications, past family history, past medical history, past social history, past surgical history, and problem list.    Review of Systems    Objective      /78   Pulse 80   Resp 18   Ht 180.3 cm (71\")   Wt 88 kg (194 lb)   BMI 27.06 kg/m²    Body mass index is 27.06 kg/m².  There were no vitals filed for this visit.      Neurologic Exam      Assessment & Plan   Independent Review of Radiographic Studies:      I personally reviewed and interpreted the images from the following studies.    No new spine imaging    Medical Decision Making:      Gurinder Amezcua is a 74 y.o. male status post C3-5 ACDF who unfortunately developed severe dysphagia following surgery which has not significantly improved now 4 months postop.  He continues to work with speech therapy and I have encouraged him to continue.  I am hopeful that the dysphagia will improve with time.  I will see him back in 3 months to evaluate his progress unless he has issues in the interim in which case he can follow-up at any time.      Diagnoses and all orders for this visit:    1. S/P spinal fusion (Primary)    2. Dysphagia, unspecified type      No follow-ups on file.    This patient was examined wearing appropriate personal protective equipment.                      Dr. Bang Mirza IV    10/07/24  11:54 EDT  "

## 2024-10-07 ENCOUNTER — OFFICE VISIT (OUTPATIENT)
Dept: NEUROSURGERY | Facility: CLINIC | Age: 74
End: 2024-10-07
Payer: COMMERCIAL

## 2024-10-07 VITALS
HEART RATE: 80 BPM | DIASTOLIC BLOOD PRESSURE: 78 MMHG | SYSTOLIC BLOOD PRESSURE: 123 MMHG | WEIGHT: 194 LBS | HEIGHT: 71 IN | BODY MASS INDEX: 27.16 KG/M2 | RESPIRATION RATE: 18 BRPM

## 2024-10-07 DIAGNOSIS — Z98.1 S/P SPINAL FUSION: Primary | ICD-10-CM

## 2024-10-07 DIAGNOSIS — R13.10 DYSPHAGIA, UNSPECIFIED TYPE: ICD-10-CM

## 2024-10-07 PROCEDURE — 99212 OFFICE O/P EST SF 10 MIN: CPT | Performed by: NEUROLOGICAL SURGERY

## 2024-10-15 ENCOUNTER — HOSPITAL ENCOUNTER (OUTPATIENT)
Dept: SPEECH THERAPY | Facility: HOSPITAL | Age: 74
Discharge: HOME | End: 2024-10-15
Admitting: FAMILY MEDICINE
Payer: MEDICARE

## 2024-10-15 PROCEDURE — 92526 ORAL FUNCTION THERAPY: CPT

## 2024-10-15 NOTE — THERAPY TREATMENT NOTE
Outpatient Speech Language Pathology   Adult Swallow Treatment Note  CHRISTINA Fitzgerald     Patient Name: Gurinder Amezcua  : 1950  MRN: [id number]  Today's Date: 10/15/2024         Visit Date: 10/15/2024   Patient Active Problem List   Diagnosis    Type 2 diabetes mellitus with diabetic polyneuropathy, with long-term current use of insulin    Dyslipidemia    Acute deep vein thrombosis (DVT) of femoral vein of right lower extremity    Arthritis    Esophageal obstruction    Other esophagitis without bleeding    Cerebral infarction, unspecified    Chronic back pain    Chronic fatigue    Degenerative disc disease, lumbar    Diaphragmatic hernia without obstruction or gangrene    Dysphagia    Encounter for screening colonoscopy    Lumbar disc disease with radiculopathy    Lower leg DVT (deep venous thromboembolism), chronic, right    History of iron deficiency    History of DVT (deep vein thrombosis)    Herniated nucleus pulposus of lumbosacral region    Hearing loss    Gastro-esophageal reflux disease without esophagitis    Gastroesophageal reflux disease without esophagitis    Gastric foreign body    Ureteral stone with hydronephrosis    Trigeminal neuralgia    Recurrent kidney stones    Nephrolithiasis    Polyp of stomach and duodenum    Mixed hyperlipidemia    Major depressive disorder, single episode, unspecified    Lumbosacral spondylosis without myelopathy    Maxillary pain    Vitamin D deficiency    Type 2 diabetes mellitus with hyperglycemia    Coronary artery disease of native artery of native heart with stable angina pectoris    Abnormal nuclear stress test    Cervical myelopathy    S/P cervical spinal fusion    Feeding difficulties    Gastrostomy tube dysfunction        Visit Dx:  No diagnosis found.    Subjective: Pt seen on this date to continue outpatient dysphagia therapy. Pt currently NPO w/ PEG feedings since 2024. Pt previously seen on 2024 for VFSS where it was recommended he remain NPO w/ PEG  feedings and participate in dysphagia therapy targeting pharyngeal strengthening. Pt reports he is having continued difficulties with nutrition.   Objective: Continuation of NMES on this date. Suprahyoid muscle activation targeted due to role in hyolaryngeal elevation to promote epiglottic inversion and airway protection. Pt completed 30 minutes of swallowing therapy in conjunction with Neuromuscular Electrical Stimulation (NMES) applied to the suprahyoid muscles on the A1 setting at 20mA of activation. Pt completed NMES with functional swallowing task of effortful swallow - both dry and with small sips of water. Pt completed 120 swallows total. Pt with cough x1. Pt reported he has been coughing d/t secretions recently. Pt reports he continues to follow home exercise program daily with recommended exercises - Effortful swallow, supraglottic swallow, and CTAR. Repetitions of exercises increased last week, pt reports he has tolerated. EMST remains at resistance of 75, plan to increase at next appointment.   Plan: Continue Intensive Dysphagia Rehabilitation (IDR) - pt comes for outpatient dysphagia therapy 1-2x/week, HEP for carryover and continuous practice of swallow/exercises. Continue NMES. Pt scheduled for next appointment Thursday October 17, 2024        LTG1: Patient will tolerate safest and least restrictive diet without complications from aspiration independently by participating in skilled dysphagia therapy targeting pharyngeal strengthening.      STG1: Patient will participate in dysphagia therapy targeting pharyngeal strengthening to improve pharyngeal swallow with minimal cues, including but not limited to: hard effortful swallow, supraglottic swallow, CTAR, sabi, EMST.      STG2: Pt will improve swallow function as noted by improvement with use of VFSS, patient reports, and increased oral intake with varied consistencies to improve overall quality of life.     STG3: Pt to participate in NMES placed on  the suprahyoid muscles paired with functional swallowing activities to decrease risk of aspiration and choking.       Ora Otto, SLP  10/15/2024

## 2024-10-17 ENCOUNTER — TELEPHONE (OUTPATIENT)
Dept: ENDOCRINOLOGY | Facility: CLINIC | Age: 74
End: 2024-10-17
Payer: MEDICARE

## 2024-10-17 ENCOUNTER — HOSPITAL ENCOUNTER (OUTPATIENT)
Dept: SPEECH THERAPY | Facility: HOSPITAL | Age: 74
Discharge: HOME | End: 2024-10-17
Payer: MEDICARE

## 2024-10-17 PROCEDURE — 92526 ORAL FUNCTION THERAPY: CPT

## 2024-10-17 NOTE — THERAPY TREATMENT NOTE
Outpatient Speech Language Pathology   Adult Swallow Treatment Note  CHRISTINA Fitzgerald     Patient Name: Gurinder Amezcua  : 1950  MRN: [id number]  Today's Date: 10/17/2024         Visit Date: 10/17/2024   Patient Active Problem List   Diagnosis    Type 2 diabetes mellitus with diabetic polyneuropathy, with long-term current use of insulin    Dyslipidemia    Acute deep vein thrombosis (DVT) of femoral vein of right lower extremity    Arthritis    Esophageal obstruction    Other esophagitis without bleeding    Cerebral infarction, unspecified    Chronic back pain    Chronic fatigue    Degenerative disc disease, lumbar    Diaphragmatic hernia without obstruction or gangrene    Dysphagia    Encounter for screening colonoscopy    Lumbar disc disease with radiculopathy    Lower leg DVT (deep venous thromboembolism), chronic, right    History of iron deficiency    History of DVT (deep vein thrombosis)    Herniated nucleus pulposus of lumbosacral region    Hearing loss    Gastro-esophageal reflux disease without esophagitis    Gastroesophageal reflux disease without esophagitis    Gastric foreign body    Ureteral stone with hydronephrosis    Trigeminal neuralgia    Recurrent kidney stones    Nephrolithiasis    Polyp of stomach and duodenum    Mixed hyperlipidemia    Major depressive disorder, single episode, unspecified    Lumbosacral spondylosis without myelopathy    Maxillary pain    Vitamin D deficiency    Type 2 diabetes mellitus with hyperglycemia    Coronary artery disease of native artery of native heart with stable angina pectoris    Abnormal nuclear stress test    Cervical myelopathy    S/P cervical spinal fusion    Feeding difficulties    Gastrostomy tube dysfunction     Subjective: Pt seen on this date to continue outpatient dysphagia therapy. Pt currently NPO w/ PEG feedings since 2024. Pt previously seen on 2024 for VFSS where it was recommended he remain NPO w/ PEG feedings and participate in dysphagia  therapy targeting pharyngeal strengthening. Pt reported continued difficulties in ordering more tube feeding food. Pt assisted in ordering food. Pt reports continuing to feel overwhelmed with medical appointments and issues, support provided.   Objective: Home exercises reviewed with pt. Pt reports he has been doing them, however the last few days swallowing has become more difficult. Pt denies any cold/flu symptoms that may be impacting swallow. Pt encouraged to continue exercises as tolerated. EMST resistance increased to 80. Pt completed 1 set of 5 to ensure resistance could be tolerated. Suprahyoid muscle activation targeted via NMES for 30 minutes on A1 setting. Pt tolerated 20 mA for 20 minutes, and 21.5 mA for 10 minutes. Pt completed 100 swallows with NMES and functional swallowing task of effortful swallow. Pt encouraged to do dry swallows, but was provided with water to aid in multiple swallows.    Plan: Continue Intensive Dysphagia Rehabilitation (IDR) - pt comes for outpatient dysphagia therapy 1-2x/week, HEP for carryover and continuous practice of swallow/exercises. Continue NMES. Pt scheduled for next appointment Monday October 21, 2024        LTG1: Patient will tolerate safest and least restrictive diet without complications from aspiration independently by participating in skilled dysphagia therapy targeting pharyngeal strengthening.      STG1: Patient will participate in dysphagia therapy targeting pharyngeal strengthening to improve pharyngeal swallow with minimal cues, including but not limited to: hard effortful swallow, supraglottic swallow, CTAR, sabi, EMST.      STG2: Pt will improve swallow function as noted by improvement with use of VFSS, patient reports, and increased oral intake with varied consistencies to improve overall quality of life.     STG3: Pt to participate in NMES placed on the suprahyoid muscles paired with functional swallowing activities to decrease risk of aspiration and  choking.       Ora Otto, SLP  10/17/2024

## 2024-10-17 NOTE — TELEPHONE ENCOUNTER
Pt came by office stating that  he is going to the bathroom every 45 min-1 hour. The only DM med that he is taking is Toujeo. Blood sugar this morning was 185, which is typically average or higher. Pt is on feeding tube. Pt's main concern is he can't get away from the bathroom and losing weight quickly. Please advise.

## 2024-10-18 NOTE — TELEPHONE ENCOUNTER
Called & left msg for pt:  Not sure if his problem is caused exclusively by the higher blood sugars, but it is a contributing factor.  Increase Toujeo to 14 units daily.

## 2024-10-21 ENCOUNTER — HOSPITAL ENCOUNTER (OUTPATIENT)
Dept: SPEECH THERAPY | Facility: HOSPITAL | Age: 74
Discharge: HOME | End: 2024-10-21
Admitting: FAMILY MEDICINE
Payer: MEDICARE

## 2024-10-21 PROCEDURE — 92526 ORAL FUNCTION THERAPY: CPT

## 2024-10-21 NOTE — THERAPY TREATMENT NOTE
Outpatient Speech Language Pathology   Adult Swallow Treatment Note  CHRISTINA Fitzgerald     Patient Name: Gurinder Amezcua  : 1950  MRN: [id number]  Today's Date: 10/21/2024         Visit Date: 10/21/2024   Patient Active Problem List   Diagnosis    Type 2 diabetes mellitus with diabetic polyneuropathy, with long-term current use of insulin    Dyslipidemia    Acute deep vein thrombosis (DVT) of femoral vein of right lower extremity    Arthritis    Esophageal obstruction    Other esophagitis without bleeding    Cerebral infarction, unspecified    Chronic back pain    Chronic fatigue    Degenerative disc disease, lumbar    Diaphragmatic hernia without obstruction or gangrene    Dysphagia    Encounter for screening colonoscopy    Lumbar disc disease with radiculopathy    Lower leg DVT (deep venous thromboembolism), chronic, right    History of iron deficiency    History of DVT (deep vein thrombosis)    Herniated nucleus pulposus of lumbosacral region    Hearing loss    Gastro-esophageal reflux disease without esophagitis    Gastroesophageal reflux disease without esophagitis    Gastric foreign body    Ureteral stone with hydronephrosis    Trigeminal neuralgia    Recurrent kidney stones    Nephrolithiasis    Polyp of stomach and duodenum    Mixed hyperlipidemia    Major depressive disorder, single episode, unspecified    Lumbosacral spondylosis without myelopathy    Maxillary pain    Vitamin D deficiency    Type 2 diabetes mellitus with hyperglycemia    Coronary artery disease of native artery of native heart with stable angina pectoris    Abnormal nuclear stress test    Cervical myelopathy    S/P cervical spinal fusion    Feeding difficulties    Gastrostomy tube dysfunction     Subjective: Pt seen on this date to continue outpatient dysphagia therapy. Last week pt reported increased difficulty in swallowing and balance difficulty. Pt reports this week his symptoms have improved. Pt attempting to see his doctor to discuss  diabetes medication. Pt received order of tube feeding supply. Pt reports that cold liquids go down easier than room tempt or hot liquids.   Objective: Continuation of NMES on this date. Application of sEMG to the suprahyoid muscles revealed 441 microvolts of muscle activation. Suprahyoid muscles targeted d/t role in hyolaryngeal elevation to promote epiglottic inversion and airway protection. Pt seen for 30 minutes of swallowing therapy in conjunction with Neuromuscular Electrical Stimulation (NMES) applied to the suprahyoid muscles on A1 setting at 20 mA. Pt completed NMES with functional swallowing task of effortful swallow - both dry and with small sips of water. Pt completed 120 swallows total. Pt with cough both before and during treatment. Pt continues to report home exercise program is going well. Exercises reviewed today to ensure continued proper implementation. EMST remains set at resistance of 80 - pt tolerating well.   Plan: Continue Intensive Dysphagia Rehabilitation (IDR) - pt comes for outpatient dysphagia therapy 1-2x/week, HEP for carryover and continuous practice of swallow/exercises. Continue NMES. Pt scheduled for next appointment Wednesday October 23, 2024.        LTG1: Patient will tolerate safest and least restrictive diet without complications from aspiration independently by participating in skilled dysphagia therapy targeting pharyngeal strengthening.      STG1: Patient will participate in dysphagia therapy targeting pharyngeal strengthening to improve pharyngeal swallow with minimal cues, including but not limited to: hard effortful swallow, supraglottic swallow, CTAR, sabi, EMST.      STG2: Pt will improve swallow function as noted by improvement with use of VFSS, patient reports, and increased oral intake with varied consistencies to improve overall quality of life.     STG3: Pt to participate in NMES placed on the suprahyoid muscles paired with functional swallowing activities to  decrease risk of aspiration and choking.       Ora Otto, SLP  10/21/2024

## 2024-10-23 ENCOUNTER — HOSPITAL ENCOUNTER (OUTPATIENT)
Dept: SPEECH THERAPY | Facility: HOSPITAL | Age: 74
Discharge: HOME | End: 2024-10-23
Admitting: FAMILY MEDICINE
Payer: MEDICARE

## 2024-10-23 PROCEDURE — 92526 ORAL FUNCTION THERAPY: CPT

## 2024-10-23 NOTE — THERAPY TREATMENT NOTE
Outpatient Speech Language Pathology   Adult Swallow Treatment Note  CHRISTINA Fitzgerald     Patient Name: Gurinder Amezcua  : 1950  MRN: [id number]  Today's Date: 10/23/2024         Visit Date: 10/23/2024   Patient Active Problem List   Diagnosis    Type 2 diabetes mellitus with diabetic polyneuropathy, with long-term current use of insulin    Dyslipidemia    Acute deep vein thrombosis (DVT) of femoral vein of right lower extremity    Arthritis    Esophageal obstruction    Other esophagitis without bleeding    Cerebral infarction, unspecified    Chronic back pain    Chronic fatigue    Degenerative disc disease, lumbar    Diaphragmatic hernia without obstruction or gangrene    Dysphagia    Encounter for screening colonoscopy    Lumbar disc disease with radiculopathy    Lower leg DVT (deep venous thromboembolism), chronic, right    History of iron deficiency    History of DVT (deep vein thrombosis)    Herniated nucleus pulposus of lumbosacral region    Hearing loss    Gastro-esophageal reflux disease without esophagitis    Gastroesophageal reflux disease without esophagitis    Gastric foreign body    Ureteral stone with hydronephrosis    Trigeminal neuralgia    Recurrent kidney stones    Nephrolithiasis    Polyp of stomach and duodenum    Mixed hyperlipidemia    Major depressive disorder, single episode, unspecified    Lumbosacral spondylosis without myelopathy    Maxillary pain    Vitamin D deficiency    Type 2 diabetes mellitus with hyperglycemia    Coronary artery disease of native artery of native heart with stable angina pectoris    Abnormal nuclear stress test    Cervical myelopathy    S/P cervical spinal fusion    Feeding difficulties    Gastrostomy tube dysfunction        Subjective: Pt seen on this date to continue outpatient dysphagia therapy. Pt reports he is continuing to struggle managing medical appointments and issues and is feeling overwhelmed this week.   Objective: EMST resistance increased to 90. Pt  completed 1 set of 5 to ensure resistance could be tolerated, pt able to complete. Home exercises still being completed per pt report. Suprahyoid muscle activation targeted via NMES for 30 minutes on A1 setting. Pt tolerated 21.5 mA for full 30 minutes. Pt completed 120 swallows with NMES and functional swallowing task of effortful swallow. Pt completed effortful swallows with dry swallows and water. Pt has completed 9 sessions of NMES. Repeat VFSS to be scheduled following 10th session to gather objective data of progress. Therapeutic counseling provided to pt to address pt feeling discouraged and overwhelmed, specifically related to swallow.    Plan: Continue Intensive Dysphagia Rehabilitation (IDR) - pt comes for outpatient dysphagia therapy 1-2x/week, HEP for carryover and continuous practice of swallow/exercises. Continue NMES. SLP to reach out to PCP for VFSS order. Pt scheduled for next appointment Tuesday October 29, 2024        LTG1: Patient will tolerate safest and least restrictive diet without complications from aspiration independently by participating in skilled dysphagia therapy targeting pharyngeal strengthening.      STG1: Patient will participate in dysphagia therapy targeting pharyngeal strengthening to improve pharyngeal swallow with minimal cues, including but not limited to: hard effortful swallow, supraglottic swallow, CTAR, sabi, EMST.      STG2: Pt will improve swallow function as noted by improvement with use of VFSS, patient reports, and increased oral intake with varied consistencies to improve overall quality of life.     STG3: Pt to participate in NMES placed on the suprahyoid muscles paired with functional swallowing activities to decrease risk of aspiration and choking.         RONEY Velazquez  10/23/2024

## 2024-10-29 ENCOUNTER — HOSPITAL ENCOUNTER (OUTPATIENT)
Dept: SPEECH THERAPY | Facility: HOSPITAL | Age: 74
Discharge: HOME | End: 2024-10-29
Admitting: FAMILY MEDICINE
Payer: MEDICARE

## 2024-10-29 DIAGNOSIS — Z79.4 TYPE 2 DIABETES MELLITUS WITH DIABETIC POLYNEUROPATHY, WITH LONG-TERM CURRENT USE OF INSULIN: Primary | ICD-10-CM

## 2024-10-29 DIAGNOSIS — E11.42 TYPE 2 DIABETES MELLITUS WITH DIABETIC POLYNEUROPATHY, WITH LONG-TERM CURRENT USE OF INSULIN: Primary | ICD-10-CM

## 2024-10-29 PROCEDURE — 92526 ORAL FUNCTION THERAPY: CPT

## 2024-10-29 RX ORDER — INSULIN GLARGINE 300 U/ML
INJECTION, SOLUTION SUBCUTANEOUS
Qty: 3 ML | Refills: 6 | Status: SHIPPED | OUTPATIENT
Start: 2024-10-29

## 2024-10-29 NOTE — THERAPY TREATMENT NOTE
Outpatient Speech Language Pathology   Adult Swallow Treatment Note  CHRISTINA Fitzgerald     Patient Name: Gurinder Amezcua  : 1950  MRN: [id number]  Today's Date: 10/29/2024         Visit Date: 10/29/2024   Patient Active Problem List   Diagnosis    Type 2 diabetes mellitus with diabetic polyneuropathy, with long-term current use of insulin    Dyslipidemia    Acute deep vein thrombosis (DVT) of femoral vein of right lower extremity    Arthritis    Esophageal obstruction    Other esophagitis without bleeding    Cerebral infarction, unspecified    Chronic back pain    Chronic fatigue    Degenerative disc disease, lumbar    Diaphragmatic hernia without obstruction or gangrene    Dysphagia    Encounter for screening colonoscopy    Lumbar disc disease with radiculopathy    Lower leg DVT (deep venous thromboembolism), chronic, right    History of iron deficiency    History of DVT (deep vein thrombosis)    Herniated nucleus pulposus of lumbosacral region    Hearing loss    Gastro-esophageal reflux disease without esophagitis    Gastroesophageal reflux disease without esophagitis    Gastric foreign body    Ureteral stone with hydronephrosis    Trigeminal neuralgia    Recurrent kidney stones    Nephrolithiasis    Polyp of stomach and duodenum    Mixed hyperlipidemia    Major depressive disorder, single episode, unspecified    Lumbosacral spondylosis without myelopathy    Maxillary pain    Vitamin D deficiency    Type 2 diabetes mellitus with hyperglycemia    Coronary artery disease of native artery of native heart with stable angina pectoris    Abnormal nuclear stress test    Cervical myelopathy    S/P cervical spinal fusion    Feeding difficulties    Gastrostomy tube dysfunction        Subjective: Pt seen on this date to continue outpatient dysphagia therapy. Pt reports difficulty with diabetes medication which he hopes to solve today. Pt also planning to meet with GI to address PEG concerns. Still awaiting repeat VFSS order  from PCP.   Objective:  Suprahyoid muscle activation targeted via NMES for 30 minutes on A1 setting. Pt tolerated 22 mA for full 30 minutes. Pt completed 100 swallows with NMES and functional swallowing task of effortful swallow. Pt completed effortful swallows with dry swallows and water. EMST resistance maintained at 90. Home exercises still being completed per pt report.  Plan: Continue Intensive Dysphagia Rehabilitation (IDR) - pt comes for outpatient dysphagia therapy 1-2x/week, HEP for carryover and continuous practice of swallow/exercises. Continue NMES. SLP to reach out again to PCP for VFSS order. Awaiting VFSS order before scheduling next appointment.         LTG1: Patient will tolerate safest and least restrictive diet without complications from aspiration independently by participating in skilled dysphagia therapy targeting pharyngeal strengthening.      STG1: Patient will participate in dysphagia therapy targeting pharyngeal strengthening to improve pharyngeal swallow with minimal cues, including but not limited to: hard effortful swallow, supraglottic swallow, CTAR, sabi, EMST.      STG2: Pt will improve swallow function as noted by improvement with use of VFSS, patient reports, and increased oral intake with varied consistencies to improve overall quality of life.     STG3: Pt to participate in NMES placed on the suprahyoid muscles paired with functional swallowing activities to decrease risk of aspiration and choking.       RONEY Velazquez  10/29/2024

## 2024-10-31 ENCOUNTER — TRANSCRIBE ORDERS (OUTPATIENT)
Dept: SPEECH THERAPY | Facility: HOSPITAL | Age: 74
End: 2024-10-31
Payer: MEDICARE

## 2024-10-31 DIAGNOSIS — R13.10 DYSPHAGIA, UNSPECIFIED TYPE: Primary | ICD-10-CM

## 2024-11-12 ENCOUNTER — HOSPITAL ENCOUNTER (OUTPATIENT)
Dept: GENERAL RADIOLOGY | Facility: HOSPITAL | Age: 74
Discharge: HOME | End: 2024-11-12
Payer: MEDICARE

## 2024-11-12 DIAGNOSIS — R13.10 DYSPHAGIA, UNSPECIFIED TYPE: Primary | ICD-10-CM

## 2024-11-12 DIAGNOSIS — R13.10 DYSPHAGIA, UNSPECIFIED TYPE: ICD-10-CM

## 2024-11-12 PROCEDURE — A9270 NON-COVERED ITEM OR SERVICE: HCPCS | Performed by: FAMILY MEDICINE

## 2024-11-12 PROCEDURE — 92611 MOTION FLUOROSCOPY/SWALLOW: CPT

## 2024-11-12 PROCEDURE — 63710000001 BARIUM SULFATE 40 % SUSPENSION: Performed by: FAMILY MEDICINE

## 2024-11-12 PROCEDURE — 63710000001 BARIUM SULFATE 96 % RECONSTITUTED SUSPENSION: Performed by: FAMILY MEDICINE

## 2024-11-12 PROCEDURE — 74230 X-RAY XM SWLNG FUNCJ C+: CPT

## 2024-11-12 PROCEDURE — 63710000001 BARIUM SULFATE 98 % RECONSTITUTED SUSPENSION: Performed by: FAMILY MEDICINE

## 2024-11-12 RX ADMIN — BARIUM SULFATE 50 ML: 400 SUSPENSION ORAL at 09:49

## 2024-11-12 RX ADMIN — BARIUM SULFATE 183 ML: 960 POWDER, FOR SUSPENSION ORAL at 09:48

## 2024-11-12 RX ADMIN — BARIUM SULFATE 135 ML: 980 POWDER, FOR SUSPENSION ORAL at 09:48

## 2024-11-12 RX ADMIN — BARIUM SULFATE 50 ML: 400 SUSPENSION ORAL at 09:18

## 2024-11-12 RX ADMIN — BARIUM SULFATE 135 ML: 980 POWDER, FOR SUSPENSION ORAL at 09:18

## 2024-11-12 NOTE — MBS/VFSS/FEES
Outpatient Speech Language Pathology   Adult Swallow Re-Evaluation  CHRISTINA Fitzgerald     Patient Name: Gurinder Amezcua  : 1950  MRN: [id number]  Today's Date: 2024         Visit Date: 2024   Patient Active Problem List   Diagnosis    Type 2 diabetes mellitus with diabetic polyneuropathy, with long-term current use of insulin    Dyslipidemia    Acute deep vein thrombosis (DVT) of femoral vein of right lower extremity    Arthritis    Esophageal obstruction    Other esophagitis without bleeding    Cerebral infarction, unspecified    Chronic back pain    Chronic fatigue    Degenerative disc disease, lumbar    Diaphragmatic hernia without obstruction or gangrene    Dysphagia    Encounter for screening colonoscopy    Lumbar disc disease with radiculopathy    Lower leg DVT (deep venous thromboembolism), chronic, right    History of iron deficiency    History of DVT (deep vein thrombosis)    Herniated nucleus pulposus of lumbosacral region    Hearing loss    Gastro-esophageal reflux disease without esophagitis    Gastroesophageal reflux disease without esophagitis    Gastric foreign body    Ureteral stone with hydronephrosis    Trigeminal neuralgia    Recurrent kidney stones    Nephrolithiasis    Polyp of stomach and duodenum    Mixed hyperlipidemia    Major depressive disorder, single episode, unspecified    Lumbosacral spondylosis without myelopathy    Maxillary pain    Vitamin D deficiency    Type 2 diabetes mellitus with hyperglycemia    Coronary artery disease of native artery of native heart with stable angina pectoris    Abnormal nuclear stress test    Cervical myelopathy    S/P cervical spinal fusion    Feeding difficulties    Gastrostomy tube dysfunction        Past Medical History:   Diagnosis Date    Ankylosing spondylitis of site in spine     Spine surgery    Anxiety     Carotid artery occlusion     Cataracts, bilateral 2022    Cervical disc disorder     Chronic pain disorder     Coronary  artery disease of native artery of native heart with stable angina pectoris 12/28/2023    Deep vein thrombosis 2018 approx    none since    Depression 6/19    Death of wife    Diabetes mellitus 2017 approx    Type 2    Headache Always    Heart attack 06/17/2021    Hypertension     Low back pain     Lumbosacral disc disease     Migraine Unknown    Mixed hyperlipidemia 03/08/2018    Neck pain Feb 2023    Peripheral neuropathy     Rheumatoid arthritis Unknown    Shingles     Spinal stenosis     Thoracic disc disorder     Trigeminal neuralgia     Ureteral stone with hydronephrosis 11/24/2016        Past Surgical History:   Procedure Laterality Date    ANTERIOR CERVICAL DISCECTOMY W/ FUSION N/A 6/6/2024    Procedure: Cervical 3-5 anterior cervical discectomy and fusion;  Surgeon: Bang Mirza IV, MD;  Location: [facility] MAIN OR;  Service: Neurosurgery;  Laterality: N/A;    BACK SURGERY  2019    4 rods in back    BRAIN SURGERY      CARDIAC CATHETERIZATION  06/17/2021    CARDIAC CATHETERIZATION N/A 01/02/2024    Procedure: Left Heart Cath with angiogram;  Surgeon: Akin Brown MD;  Location: [facility] CATH INVASIVE LOCATION;  Service: Cardiovascular;  Laterality: N/A;    CARDIAC CATHETERIZATION N/A 01/02/2024    Procedure: Left ventriculography;  Surgeon: Akin Brown MD;  Location: [facility] CATH INVASIVE LOCATION;  Service: Cardiovascular;  Laterality: N/A;    CAROTID STENT      CORONARY STENT PLACEMENT  06/17/2021    CRANIOTOMY      CYBERKNIFE      ENDOSCOPY      ENDOSCOPY W/ PEG TUBE PLACEMENT N/A 6/11/2024    Procedure: ESOPHAGOGASTRODUODENOSCOPY WITH PERCUTANEOUS ENDOSCOPIC GASTROSTOMY TUBE INSERTION;  Surgeon: Niranjan Rich MD;  Location: [facility] ENDOSCOPY;  Service: Gastroenterology;  Laterality: N/A;  post op:    EPIDURAL BLOCK      HERNIA REPAIR      KNEE ARTHROSCOPY      LAMINECTOMY      NECK SURGERY  Unknown    SPINAL CORD STIMULATOR IMPLANT  Previously    SPINAL FUSION  Unknown    SPINE SURGERY       TRIGEMINAL NERVE DECOMPRESSION      TRIGGER POINT INJECTION           Subjective: Pt here for repeat VFSS to gain objective data of dysphagia therapy progress to date. Pt with previous VFSS in 08/2024 with the recommendation to remain NPO w/ PEG feedings. Pt has participated in 8 weeks of dysphagia therapy with 10 sessions of NMES.   Previous Diet: NPO w/ PEG   Patient Positioning: Sitting 90 degrees.  Viewing Plane: Lateral   Contrast: Barium used across trial textures; administered via spoon and cup. The patient was assessed with the following consistencies: Thins by cup x2, puree (applesauce) x2, STC- mixed consistency (peaches in juice) x2, NTL by cup x2. Pt able to hold cup and take drinks independently.     Overall Impressions: VFSS completed to determine current progress and assess pt safety for PO intake. Pt pharyngeal swallow found to be moderately impaired as characterized by weak pharyngeal stripping wave and moderate pharyngeal residues. Pt swallow has improved since prior VFSS as demonstrated by increased epiglottic deflection, improved hyoid excursion, improved laryngeal vestibular closure, and ability to minimize residues. It is recommended pt continue receiving primary nutrition through PEG tube feedings, however initiate a Mechanical Ground/Thins diet with the following safe swallowing strategies: Effortful swallow, double swallows, sitting up for PO intake, alternating liquids/solids, small sips/bites, head in neutral position. It is recommended pt continue to participate in dysphagia therapy 1-2x a week to target pharyngeal strengthening.     Oral Phase found grossly WNL - lip closure, tongue control, mastication, bolus transport, and oral clearance were all functional.     Pharyngeal phase found moderately impaired: initiation of swallow and soft palate elevation WNL. Hyoid and epiglottic movement, pharyngeal contraction, tongue base retraction, and laryngeal vestibular closure reduced. Pt with  pharyngeal residues on vallecula and pyriforms, majority clear with double swallow. PES opening with slowed clearance. Esophageal scan unremarkable. Pt scored a 2 on the Penetration Aspiration Scale (PAS), details below.     Penetration Aspiration Scale (PAS) is an eight-point scale used to assess the severity of penetration or aspiration observed during a VFSS/MBSS. The scores are as follows:   No Penetration or Aspiration: Material does not enter the airway.  Penetration, Contrast Remains Above Vocal Folds, Subsequently Ejected: Material enters the larynx but stays above the vocal folds and is later ejected.   Penetration, Contrast Remains Above Vocal folds, Not Ejected: Material enters the larynx but remains above the vocal folds without being ejected.   Penetration, Contrast Contacts Vocal Folds, Subsequently Ejected: Material contacts the vocal folds and is later ejected.  Penetration, Contrast Contacts Vocal Folds, Not Ejected: Material contacts the vocal folds but is not ejected.  Aspiration (Contrast Below Vocal Folds), Subsequently Ejected (At Least into Larynx): Material passes below the vocal folds and is later ejected.  Aspiration (Contrast Below Vocal Folds), Not Ejected Despite Effort: Material passes below the vocal folds but is not ejected despite effort.  Aspiration (Contrast Below Vocal Folds), No Effort Made to Eject: Material passes below the vocal folds, and no effort is made to eject it.       SLP Recommendation and Plan  -Continue receiving nutrition through PEG  -Mechanical Ground/Thins   -Safe Swallow Strategies: Effortful swallow, double swallows, sitting up for PO intake, alternating liquids/solids, small sips/bites, head in neutral position.  -Continue Dysphagia Therapy, goals below.     Dysphagia Therapy Goals:     LTG1: Patient will tolerate safest and least restrictive diet without complications from aspiration independently by participating in skilled dysphagia therapy targeting  pharyngeal strengthening.    PROGRESS: Good progress towards goal.      STG1: Patient will participate in dysphagia therapy targeting pharyngeal strengthening to improve pharyngeal swallow with minimal cues, including but not limited to: hard effortful swallow, supraglottic swallow, CTAR, sabi, EMST.    PROGRESS: Met.      STG2: Pt will improve swallow function as noted by improvement with use of VFSS, patient reports, and increased oral intake with varied consistencies to improve overall quality of life by 02/2025.   PROGRESS: Partially met - continue with goal.      STG3: Pt to participate in NMES placed on the suprahyoid muscles paired with functional swallowing activities to decrease risk of aspiration and choking by 02/2025.   PROGRESS: Partially met - continue with goal.      STG4: Patient will participate in dysphagia therapy targeting pharyngeal strengthening to improve pharyngeal swallow including, but not limited to, HEP (hard effortful swallow, supraglottic swallow, super supraglottic swallow, CTAR, sabi, EMST-150) and MDTP by 02/2025.    PROGRESS: New goal    RONEY Velazquez  11/12/2024

## 2024-11-21 ENCOUNTER — HOSPITAL ENCOUNTER (OUTPATIENT)
Dept: SPEECH THERAPY | Facility: HOSPITAL | Age: 74
Discharge: HOME | End: 2024-11-21
Payer: MEDICARE

## 2024-11-21 PROCEDURE — 92526 ORAL FUNCTION THERAPY: CPT

## 2024-11-21 NOTE — THERAPY TREATMENT NOTE
"Outpatient Speech Language Pathology   Adult Swallow Treatment Note  CHRISTINA Fitzgerald     Patient Name: Gurinder Amezcua  : 1950  MRN: [id number]  Today's Date: 2024         Visit Date: 2024   Patient Active Problem List   Diagnosis    Type 2 diabetes mellitus with diabetic polyneuropathy, with long-term current use of insulin    Dyslipidemia    Acute deep vein thrombosis (DVT) of femoral vein of right lower extremity    Arthritis    Esophageal obstruction    Other esophagitis without bleeding    Cerebral infarction, unspecified    Chronic back pain    Chronic fatigue    Degenerative disc disease, lumbar    Diaphragmatic hernia without obstruction or gangrene    Dysphagia    Encounter for screening colonoscopy    Lumbar disc disease with radiculopathy    Lower leg DVT (deep venous thromboembolism), chronic, right    History of iron deficiency    History of DVT (deep vein thrombosis)    Herniated nucleus pulposus of lumbosacral region    Hearing loss    Gastro-esophageal reflux disease without esophagitis    Gastroesophageal reflux disease without esophagitis    Gastric foreign body    Ureteral stone with hydronephrosis    Trigeminal neuralgia    Recurrent kidney stones    Nephrolithiasis    Polyp of stomach and duodenum    Mixed hyperlipidemia    Major depressive disorder, single episode, unspecified    Lumbosacral spondylosis without myelopathy    Maxillary pain    Vitamin D deficiency    Type 2 diabetes mellitus with hyperglycemia    Coronary artery disease of native artery of native heart with stable angina pectoris    Abnormal nuclear stress test    Cervical myelopathy    S/P cervical spinal fusion    Feeding difficulties    Gastrostomy tube dysfunction        Subjective: Pt seen on this date to continue outpatient dysphagia therapy. VFSS on 2024 with following recommendation \"Mechanical Ground/Thins diet with the following safe swallowing strategies: Effortful swallow, double swallows, sitting " "up for PO intake, alternating liquids/solids, small sips/bites, head in neutral position.\" Pt reports he has not tried much food since video.     Objective: Pt reports home exercises have continued to go well. Suprahyoid muscle activation targeted via NMES for 30 minutes on A1 setting. Pt tolerated 20 mA for full 30 minutes. Pt completed 90 swallows with NMES and functional swallowing task of effortful swallow. Pt completed effortful swallows with dry swallows and water. Further education and handouts provided regarding mechanical soft diet with recommendations of foods to try at home. Pt reports some apprehension to try new foods since he's been NPO for months. Pt encouraged to try at least 1 new item this week, pt agreeable.     Plan: Continue Intensive Dysphagia Rehabilitation (IDR) - pt comes for outpatient dysphagia therapy 1-2x/week, HEP for carryover and continuous practice of swallow/exercises. Continue NMES. Start MDTP.     LTG1: Patient will tolerate safest and least restrictive diet without complications from aspiration independently by participating in skilled dysphagia therapy targeting pharyngeal strengthening.      STG1: Pt will improve swallow function as noted by improvement with use of VFSS, patient reports, and increased oral intake with varied consistencies to improve overall quality of life by 02/2025.               STG2: Pt to participate in NMES placed on the suprahyoid muscles paired with functional swallowing activities to decrease risk of aspiration and choking by 02/2025.                             STG3: Patient will participate in dysphagia therapy targeting pharyngeal strengthening to improve pharyngeal swallow including, but not limited to, HEP (hard effortful swallow, supraglottic swallow, super supraglottic swallow, CTAR, sabi, EMST-150) and MDTP by 02/2025.                              Ora Otto, SLP  11/21/2024  "

## 2024-11-26 ENCOUNTER — HOSPITAL ENCOUNTER (OUTPATIENT)
Dept: SPEECH THERAPY | Facility: HOSPITAL | Age: 74
Discharge: HOME | End: 2024-11-26
Admitting: FAMILY MEDICINE
Payer: MEDICARE

## 2024-11-26 PROCEDURE — 92526 ORAL FUNCTION THERAPY: CPT

## 2024-11-26 NOTE — THERAPY TREATMENT NOTE
Outpatient Speech Language Pathology   Adult Swallow Treatment Note  CHRISTINA Fitzgerald     Patient Name: Gurinder Amezcua  : 1950  MRN: [id number]  Today's Date: 2024         Visit Date: 2024   Patient Active Problem List   Diagnosis    Type 2 diabetes mellitus with diabetic polyneuropathy, with long-term current use of insulin    Dyslipidemia    Acute deep vein thrombosis (DVT) of femoral vein of right lower extremity    Arthritis    Esophageal obstruction    Other esophagitis without bleeding    Cerebral infarction, unspecified    Chronic back pain    Chronic fatigue    Degenerative disc disease, lumbar    Diaphragmatic hernia without obstruction or gangrene    Dysphagia    Encounter for screening colonoscopy    Lumbar disc disease with radiculopathy    Lower leg DVT (deep venous thromboembolism), chronic, right    History of iron deficiency    History of DVT (deep vein thrombosis)    Herniated nucleus pulposus of lumbosacral region    Hearing loss    Gastro-esophageal reflux disease without esophagitis    Gastroesophageal reflux disease without esophagitis    Gastric foreign body    Ureteral stone with hydronephrosis    Trigeminal neuralgia    Recurrent kidney stones    Nephrolithiasis    Polyp of stomach and duodenum    Mixed hyperlipidemia    Major depressive disorder, single episode, unspecified    Lumbosacral spondylosis without myelopathy    Maxillary pain    Vitamin D deficiency    Type 2 diabetes mellitus with hyperglycemia    Coronary artery disease of native artery of native heart with stable angina pectoris    Abnormal nuclear stress test    Cervical myelopathy    S/P cervical spinal fusion    Feeding difficulties    Gastrostomy tube dysfunction        Subjective: Pt seen on this date to continue outpatient dysphagia therapy. Pt reports he has tried many foods this week following discussion during last appointment. Pt reports that soup, scrambled eggs, mac and cheese, mashed potatoes, Maur[place]  "fries, and all thin liquids have gone well. He reports that he tried a hamburger which went poorly, but was aware on why based on past education given, but said \"I just had to try.\"      Objective: EMST remains at 90, will bring at next session to increase resistance. Pt reports home exercises going well and have become routine. Pt encouraged to complete 120-150 effortful swallows a day with new foods/textures based on Mechanical Soft recommendation, pt reports able to complete this week without issue. Suprahyoid muscle activation targeted via NMES for 30 minutes on A1 setting. Pt tolerated 20.5 mA for 32 minutes. Pt completed 130 swallows with NMES and functional swallowing task of effortful swallow. Pt completed effortful swallows with dry swallows and water. No coughing/throat clearing on this date. Pt encouraged again to try new foods this week that were on the recommended handout given week prior, pt agreeable.      Plan: Continue Intensive Dysphagia Rehabilitation (IDR) - pt comes for outpatient dysphagia therapy 1x/week, HEP for carryover and continuous practice of swallow/exercises. Continue NMES. Continue MDTP.      LTG1: Patient will tolerate safest and least restrictive diet without complications from aspiration independently by participating in skilled dysphagia therapy targeting pharyngeal strengthening.      STG1: Pt will improve swallow function as noted by improvement with use of VFSS, patient reports, and increased oral intake with varied consistencies to improve overall quality of life by 02/2025.               STG2: Pt to participate in NMES placed on the suprahyoid muscles paired with functional swallowing activities to decrease risk of aspiration and choking by 02/2025.                             STG3: Patient will participate in dysphagia therapy targeting pharyngeal strengthening to improve pharyngeal swallow including, but not limited to, HEP (hard effortful swallow, supraglottic swallow, " super supraglottic swallow, CTAR, sabi, EMST-150) and MDTP by 02/2025.        Ora Otto, SLP  11/26/2024

## 2024-12-03 ENCOUNTER — HOSPITAL ENCOUNTER (OUTPATIENT)
Dept: SPEECH THERAPY | Facility: HOSPITAL | Age: 74
Discharge: HOME | End: 2024-12-03
Admitting: FAMILY MEDICINE
Payer: MEDICARE

## 2024-12-03 DIAGNOSIS — Z79.4 TYPE 2 DIABETES MELLITUS WITH DIABETIC POLYNEUROPATHY, WITH LONG-TERM CURRENT USE OF INSULIN: ICD-10-CM

## 2024-12-03 DIAGNOSIS — E11.42 TYPE 2 DIABETES MELLITUS WITH DIABETIC POLYNEUROPATHY, WITH LONG-TERM CURRENT USE OF INSULIN: ICD-10-CM

## 2024-12-03 PROCEDURE — 92526 ORAL FUNCTION THERAPY: CPT

## 2024-12-03 RX ORDER — INSULIN GLARGINE 300 U/ML
INJECTION, SOLUTION SUBCUTANEOUS
Qty: 6 ML | Refills: 1 | Status: SHIPPED | OUTPATIENT
Start: 2024-12-03

## 2024-12-03 NOTE — THERAPY TREATMENT NOTE
Outpatient Speech Language Pathology   Adult Swallow Progress Note  CHRISTINA Fitzgerald     Patient Name: Gurinder Amezcua  : 1950  MRN: [id number]  Today's Date: 12/3/2024         Visit Date: 2024   Patient Active Problem List   Diagnosis    Type 2 diabetes mellitus with diabetic polyneuropathy, with long-term current use of insulin    Dyslipidemia    Acute deep vein thrombosis (DVT) of femoral vein of right lower extremity    Arthritis    Esophageal obstruction    Other esophagitis without bleeding    Cerebral infarction, unspecified    Chronic back pain    Chronic fatigue    Degenerative disc disease, lumbar    Diaphragmatic hernia without obstruction or gangrene    Dysphagia    Encounter for screening colonoscopy    Lumbar disc disease with radiculopathy    Lower leg DVT (deep venous thromboembolism), chronic, right    History of iron deficiency    History of DVT (deep vein thrombosis)    Herniated nucleus pulposus of lumbosacral region    Hearing loss    Gastro-esophageal reflux disease without esophagitis    Gastroesophageal reflux disease without esophagitis    Gastric foreign body    Ureteral stone with hydronephrosis    Trigeminal neuralgia    Recurrent kidney stones    Nephrolithiasis    Polyp of stomach and duodenum    Mixed hyperlipidemia    Major depressive disorder, single episode, unspecified    Lumbosacral spondylosis without myelopathy    Maxillary pain    Vitamin D deficiency    Type 2 diabetes mellitus with hyperglycemia    Coronary artery disease of native artery of native heart with stable angina pectoris    Abnormal nuclear stress test    Cervical myelopathy    S/P cervical spinal fusion    Feeding difficulties    Gastrostomy tube dysfunction        Subjective: Pt seen on this date to continue outpatient dysphagia therapy. Pt continues to try various foods at home given suggestion list from previous sessions. Pt with no reports of difficulty this week with foods/liquids. Pt reports continued  weight gain since resuming PO intake.      Objective: Pt reports home exercises going well, completes approx 150 effortful swallows daily. EMST increased to 100 on this date. Suprahyoid muscle activation targeted via NMES for 30 minutes on A1 setting. Pt tolerated 22 mA for 30 minutes. Pt completed 120 swallows with NMES and functional swallowing task of effortful swallow. Pt completed effortful swallows with dry swallows and water. No coughing/throat clearing on this date. Pt encouraged to try more foods again this week and track what works and what he has difficulties with. The Eating Assessment Tool (EAT-10) completed, pt with score of 30/40 - improvement from previous score of 36/40.     Plan: Continue outpatient dysphagia therapy, lower frequency - pt comes for outpatient DT 2x/month. Continue HEP with MDTP. Continue NMES. Pt should continue with Mechanical Soft (IDDSI: Minced and Moist)/Thin diet. Pt continues to have primary nutrition and meds through PEG as PO intake is not enough at this time to sustain primary nutrition.      LTG1: Patient will tolerate safest and least restrictive diet without complications from aspiration independently by participating in skilled dysphagia therapy targeting pharyngeal strengthening.    PROGRESS: Good progress towards goal. Pt continuing with dysphagia therapy, now able to tolerate diet of Mechanical Soft/Thins which is improvement from original recommendation of NPO.      STG1: Pt will improve swallow function as noted by improvement with use of VFSS, patient reports, and increased oral intake with varied consistencies to improve overall quality of life by 02/2025.   PROGRESS: good progress towards goal. Pt swallow has improved given objective data from VFSS (see VFSS report). Pt has increased PO intake with varied consistencies. Pt also has reported weight gain for the first time since beginning dysphagia therapy (08/27/2024). EAT-10 score (pt report) has improved 6  points from 10/21/2024.               STG2: Pt to participate in NMES placed on the suprahyoid muscles paired with functional swallowing activities to decrease risk of aspiration and choking by 02/2025.   PROGRESS: good progress towards goal. Pt has completed 13 sessions of NMES, able to tolerate 20-22 mA for 30 minutes during sessions.                              STG3: Patient will participate in dysphagia therapy targeting pharyngeal strengthening to improve pharyngeal swallow including, but not limited to, HEP (hard effortful swallow, supraglottic swallow, super supraglottic swallow, CTAR, sabi, EMST-150) and MDTP by 02/2025.   PROGRESS: good progress towards goal. Pt completing HEP exercises and MDTP with various foods/liquids with an average of 150 swallows/day. Pt completes approx 120 effortful swallows during OP DT sessions additionally.         Ora Otto, SLP  12/3/2024

## 2024-12-10 ENCOUNTER — TELEPHONE (OUTPATIENT)
Dept: NEUROSURGERY | Facility: CLINIC | Age: 74
End: 2024-12-10

## 2024-12-10 NOTE — TELEPHONE ENCOUNTER
Please start a PA on the pt's Ensure drinks.          [facility] received a fax that requires your attention. The document has been indexed to the patient’s chart for your review.      Reason for sending: RECEIVED A NARRATIVE QUESTIONS, THAT NEEDS  TO COMPLETE AN RETURN    Documents Description: NARRATIVE JONATAN_BRANDY NAVA_12/09/24    Name of Sender: MADDY FIELDS    Date Indexed: 12/10/24

## 2024-12-17 ENCOUNTER — HOSPITAL ENCOUNTER (OUTPATIENT)
Dept: SPEECH THERAPY | Facility: HOSPITAL | Age: 74
Discharge: HOME | End: 2024-12-17
Admitting: FAMILY MEDICINE
Payer: MEDICARE

## 2024-12-17 PROCEDURE — 92526 ORAL FUNCTION THERAPY: CPT

## 2024-12-17 NOTE — THERAPY TREATMENT NOTE
Outpatient Speech Language Pathology   Adult Swallow Treatment Note  CHRISTINA Fitzgerald     Patient Name: Gurinder Amezcau  : 1950  MRN: [id number]  Today's Date: 2024         Visit Date: 2024   Patient Active Problem List   Diagnosis    Type 2 diabetes mellitus with diabetic polyneuropathy, with long-term current use of insulin    Dyslipidemia    Acute deep vein thrombosis (DVT) of femoral vein of right lower extremity    Arthritis    Esophageal obstruction    Other esophagitis without bleeding    Cerebral infarction, unspecified    Chronic back pain    Chronic fatigue    Degenerative disc disease, lumbar    Diaphragmatic hernia without obstruction or gangrene    Dysphagia    Encounter for screening colonoscopy    Lumbar disc disease with radiculopathy    Lower leg DVT (deep venous thromboembolism), chronic, right    History of iron deficiency    History of DVT (deep vein thrombosis)    Herniated nucleus pulposus of lumbosacral region    Hearing loss    Gastro-esophageal reflux disease without esophagitis    Gastroesophageal reflux disease without esophagitis    Gastric foreign body    Ureteral stone with hydronephrosis    Trigeminal neuralgia    Recurrent kidney stones    Nephrolithiasis    Polyp of stomach and duodenum    Mixed hyperlipidemia    Major depressive disorder, single episode, unspecified    Lumbosacral spondylosis without myelopathy    Maxillary pain    Vitamin D deficiency    Type 2 diabetes mellitus with hyperglycemia    Coronary artery disease of native artery of native heart with stable angina pectoris    Abnormal nuclear stress test    Cervical myelopathy    S/P cervical spinal fusion    Feeding difficulties    Gastrostomy tube dysfunction        Subjective: Pt seen on this date to continue outpatient dysphagia therapy. Pt reports continued weight gain since resuming PO intake. Pt provided list of foods he has been successful with in past 2 weeks. Pt encouraged to keep trying different  foods and monitoring what goes well/poorly. Pt reported difficulty with liquid at night x1, pt encouraged to monitor if issue persists and at what time.      Objective: Continuation of Neuromuscular Electrical Stimulation on this date. Suprahyoid muscle activation targeted via NMES for 30 minutes on A1 setting. Pt tolerated 22.2 mA for 30 minutes. Pt completed 120 swallows with NMES and functional swallowing task of effortful swallow. Pt continues to report he is completing home exercise program and EMST. EMST remains at 100 on this date.      Plan: Continue outpatient dysphagia therapy, lower frequency - pt comes for outpatient DT 2x/month. Continue HEP with MDTP. Continue NMES. Pt should continue with Mechanical Soft (IDDSI: Minced and Moist)/Thin diet. Pt continues to have primary nutrition and meds through PEG as PO intake is not enough at this time to sustain primary nutrition. Pt scheduled for next appointment January 7, 2025.      LTG1: Patient will tolerate safest and least restrictive diet without complications from aspiration independently by participating in skilled dysphagia therapy targeting pharyngeal strengthening.        STG1: Pt will improve swallow function as noted by improvement with use of VFSS, patient reports, and increased oral intake with varied consistencies to improve overall quality of life by 02/2025.               STG2: Pt to participate in NMES placed on the suprahyoid muscles paired with functional swallowing activities to decrease risk of aspiration and choking by 02/2025.                             STG3: Patient will participate in dysphagia therapy targeting pharyngeal strengthening to improve pharyngeal swallow including, but not limited to, HEP (hard effortful swallow, supraglottic swallow, super supraglottic swallow, CTAR, sabi, EMST-150) and MDTP by 02/2025.      Ora Otto, SLP  12/17/2024

## 2025-01-14 ENCOUNTER — HOSPITAL ENCOUNTER (OUTPATIENT)
Dept: SPEECH THERAPY | Facility: HOSPITAL | Age: 75
Setting detail: THERAPIES SERIES
Discharge: HOME | End: 2025-01-14
Payer: MEDICARE

## 2025-01-14 PROCEDURE — 92526 ORAL FUNCTION THERAPY: CPT

## 2025-01-14 NOTE — THERAPY TREATMENT NOTE
Outpatient Speech Language Pathology   Adult Swallow Treatment Note  CHRISTINA Fitzgerald     Patient Name: Gurinder Amezcua  : 1950  MRN: [id number]  Today's Date: 2025         Visit Date: 2025   Patient Active Problem List   Diagnosis    Type 2 diabetes mellitus with diabetic polyneuropathy, with long-term current use of insulin    Dyslipidemia    Acute deep vein thrombosis (DVT) of femoral vein of right lower extremity    Arthritis    Esophageal obstruction    Other esophagitis without bleeding    Cerebral infarction, unspecified    Chronic back pain    Chronic fatigue    Degenerative disc disease, lumbar    Diaphragmatic hernia without obstruction or gangrene    Dysphagia    Encounter for screening colonoscopy    Lumbar disc disease with radiculopathy    Lower leg DVT (deep venous thromboembolism), chronic, right    History of iron deficiency    History of DVT (deep vein thrombosis)    Herniated nucleus pulposus of lumbosacral region    Hearing loss    Gastro-esophageal reflux disease without esophagitis    Gastroesophageal reflux disease without esophagitis    Gastric foreign body    Ureteral stone with hydronephrosis    Trigeminal neuralgia    Recurrent kidney stones    Nephrolithiasis    Polyp of stomach and duodenum    Mixed hyperlipidemia    Major depressive disorder, single episode, unspecified    Lumbosacral spondylosis without myelopathy    Maxillary pain    Vitamin D deficiency    Type 2 diabetes mellitus with hyperglycemia    Coronary artery disease of native artery of native heart with stable angina pectoris    Abnormal nuclear stress test    Cervical myelopathy    S/P cervical spinal fusion    Feeding difficulties    Gastrostomy tube dysfunction        Visit Dx:  Dysphagia    Subjective: Pt seen on this date to continue outpatient dysphagia therapy. Pt states he is now up to 201 lbs. Pt states he has continued to try new foods and all is well. Pt reports increase in reflux.      Objective:  "Continuation of Neuromuscular Electrical Stimulation on this date. Suprahyoid muscle activation targeted via NMES for 30 minutes on A1 setting. Pt tolerated 20 mA for 30 minutes. Pt completed 120 swallows with NMES and functional swallowing task of effortful swallow. EMST remains at 100, pt unable to increase on this date. HEP going well - pt reports doing exercises \"mostly\" daily. Pt reports new memory difficulties. Pt encouraged to continue HEP daily and to place handouts in view to aid in memory.      Plan: Continue outpatient dysphagia therapy, lower frequency - pt comes for outpatient DT 2x/month. Continue HEP with MDTP. Continue NMES. Pt should continue with Mechanical Soft (IDDSI: Minced and Moist)/Thin diet. Pt continues to have primary nutrition and meds through PEG as PO intake is not enough at this time to sustain primary nutrition. Pt scheduled for next appointment January 28, 2025.      LTG1: Patient will tolerate safest and least restrictive diet without complications from aspiration independently by participating in skilled dysphagia therapy targeting pharyngeal strengthening.        STG1: Pt will improve swallow function as noted by improvement with use of VFSS, patient reports, and increased oral intake with varied consistencies to improve overall quality of life by 02/2025.               STG2: Pt to participate in NMES placed on the suprahyoid muscles paired with functional swallowing activities to decrease risk of aspiration and choking by 02/2025.                             STG3: Patient will participate in dysphagia therapy targeting pharyngeal strengthening to improve pharyngeal swallow including, but not limited to, HEP (hard effortful swallow, supraglottic swallow, super supraglottic swallow, CTAR, sabi, EMST-150) and MDTP by 02/2025.        Ora Otto, SLP  1/14/2025  "

## 2025-01-28 ENCOUNTER — HOSPITAL ENCOUNTER (OUTPATIENT)
Dept: SPEECH THERAPY | Facility: HOSPITAL | Age: 75
Setting detail: THERAPIES SERIES
Discharge: HOME | End: 2025-01-28
Payer: MEDICARE

## 2025-01-28 PROCEDURE — 92526 ORAL FUNCTION THERAPY: CPT

## 2025-01-28 NOTE — THERAPY TREATMENT NOTE
Outpatient Speech Language Pathology   Adult Swallow Treatment Note  CHRISTINA Fitzgerald     Patient Name: Gurinder Amezcua  : 1950  MRN: [id number]  Today's Date: 2025         Visit Date: 2025   Patient Active Problem List   Diagnosis    Type 2 diabetes mellitus with diabetic polyneuropathy, with long-term current use of insulin    Dyslipidemia    Acute deep vein thrombosis (DVT) of femoral vein of right lower extremity    Arthritis    Esophageal obstruction    Other esophagitis without bleeding    Cerebral infarction, unspecified    Chronic back pain    Chronic fatigue    Degenerative disc disease, lumbar    Diaphragmatic hernia without obstruction or gangrene    Dysphagia    Encounter for screening colonoscopy    Lumbar disc disease with radiculopathy    Lower leg DVT (deep venous thromboembolism), chronic, right    History of iron deficiency    History of DVT (deep vein thrombosis)    Herniated nucleus pulposus of lumbosacral region    Hearing loss    Gastro-esophageal reflux disease without esophagitis    Gastroesophageal reflux disease without esophagitis    Gastric foreign body    Ureteral stone with hydronephrosis    Trigeminal neuralgia    Recurrent kidney stones    Nephrolithiasis    Polyp of stomach and duodenum    Mixed hyperlipidemia    Major depressive disorder, single episode, unspecified    Lumbosacral spondylosis without myelopathy    Maxillary pain    Vitamin D deficiency    Type 2 diabetes mellitus with hyperglycemia    Coronary artery disease of native artery of native heart with stable angina pectoris    Abnormal nuclear stress test    Cervical myelopathy    S/P cervical spinal fusion    Feeding difficulties    Gastrostomy tube dysfunction        Visit Dx:  Dysphagia    Subjective: Pt seen on this date to continue outpatient dysphagia therapy. Pt currently with a cold. Pt reports increase in trigeminal nerve pain that is causing difficulty with chewing and mouth movements. Weight  consistently between 199-201 at this time.     Objective: Pt continues with NMES targeting suprahyoid muscle activation. 30 minutes completed at 23 mA. Pt completed total of 110 swallows with effortful swallow. Pt tolerated without difficulty. HEP still going well, pt independent with program. EMST remains on 100. Pt encouraged to continue trying new foods/textures, reports he is trying at home.      Plan: Continue outpatient dysphagia therapy, lower frequency - pt comes for outpatient DT 2x/month. Continue HEP with MDTP. Continue NMES. Pt should continue with Mechanical Soft (IDDSI: Minced and Moist)/Thin diet. Pt continues to have primary nutrition and meds through PEG as PO intake is not enough at this time to sustain primary nutrition. Pt scheduled for next appointment February 11, 2025.      LTG1: Patient will tolerate safest and least restrictive diet without complications from aspiration independently by participating in skilled dysphagia therapy targeting pharyngeal strengthening.        STG1: Pt will improve swallow function as noted by improvement with use of VFSS, patient reports, and increased oral intake with varied consistencies to improve overall quality of life by 02/2025.               STG2: Pt to participate in NMES placed on the suprahyoid muscles paired with functional swallowing activities to decrease risk of aspiration and choking by 02/2025.                             STG3: Patient will participate in dysphagia therapy targeting pharyngeal strengthening to improve pharyngeal swallow including, but not limited to, HEP (hard effortful swallow, supraglottic swallow, super supraglottic swallow, CTAR, sabi, EMST-150) and MDTP by 02/2025.      Ora Otto, SLP  1/28/2025

## 2025-02-05 NOTE — PROGRESS NOTES
"Subjective   History of Present Illness: Gurinder Amezcua is a 74 y.o. male is here today for follow-up. Today patient reports he still cannot swallow and has been on a feeding tube for 8 months.  His swallowing has improved to some extent with therapy and he is able to get down some soft foods.  He continues to require nutrition through the feeding tube.  No other new issues in terms of his neck.      Chief Complaint   Patient presents with    Follow-up          Previous treatment: PO PT, speech therapy     Previous neurosurgery:  C3-5 ACDF 6/6/24; PEG tube placement on 6/11/2024     Previous injections:     The following portions of the patient's history were reviewed and updated as appropriate: allergies, current medications, past family history, past medical history, past social history, past surgical history, and problem list.    Review of Systems    Objective      /83 (BP Location: Left arm, Patient Position: Sitting)   Pulse 89   Ht 180.3 cm (71\")   Wt 93.9 kg (207 lb)   SpO2 97%   BMI 28.87 kg/m²    Body mass index is 28.87 kg/m².  There were no vitals filed for this visit.      Neurological Exam        Assessment & Plan   Independent Review of Radiographic Studies:      I personally reviewed and interpreted the images from the following studies.    No new imaging    Medical Decision Making:      Gurinder Amezcua is a 74 y.o. male status post ACDF performed approximately 6 months ago with postoperative dysphagia requiring feeding tube placement.  Overall he has seen improvement over the last 3 months with therapy in terms of p.o. intake, but still is reliant upon the feeding tube.  I am hopeful he will continue to see improvement over the course of time.  No limitations from my perspective in terms of his neck.  I will see him back at 1 year postop.      Diagnoses and all orders for this visit:    1. S/P spinal fusion (Primary)    2. Dysphagia, unspecified type      No follow-ups on file.    This " patient was examined wearing appropriate personal protective equipment.                      Dr. Bang Mirza IV    02/06/25  11:37 EST

## 2025-02-06 ENCOUNTER — OFFICE VISIT (OUTPATIENT)
Dept: NEUROSURGERY | Facility: CLINIC | Age: 75
End: 2025-02-06
Payer: MEDICARE

## 2025-02-06 VITALS
HEIGHT: 71 IN | BODY MASS INDEX: 28.98 KG/M2 | HEART RATE: 89 BPM | DIASTOLIC BLOOD PRESSURE: 83 MMHG | WEIGHT: 207 LBS | SYSTOLIC BLOOD PRESSURE: 128 MMHG | OXYGEN SATURATION: 97 %

## 2025-02-06 DIAGNOSIS — Z98.1 S/P SPINAL FUSION: Primary | ICD-10-CM

## 2025-02-06 DIAGNOSIS — R13.10 DYSPHAGIA, UNSPECIFIED TYPE: ICD-10-CM

## 2025-02-11 ENCOUNTER — HOSPITAL ENCOUNTER (OUTPATIENT)
Dept: SPEECH THERAPY | Facility: HOSPITAL | Age: 75
Setting detail: THERAPIES SERIES
Discharge: HOME | End: 2025-02-11
Payer: MEDICARE

## 2025-02-11 PROCEDURE — 92526 ORAL FUNCTION THERAPY: CPT

## 2025-02-11 NOTE — THERAPY TREATMENT NOTE
Outpatient Speech Language Pathology   Adult Swallow Treatment Note  CHRISTINA Fitzgerald     Patient Name: Gurinder Amezcua  : 1950  MRN: [id number]  Today's Date: 2025         Visit Date: 2025   Patient Active Problem List   Diagnosis    Type 2 diabetes mellitus with diabetic polyneuropathy, with long-term current use of insulin    Dyslipidemia    Acute deep vein thrombosis (DVT) of femoral vein of right lower extremity    Arthritis    Esophageal obstruction    Other esophagitis without bleeding    Cerebral infarction, unspecified    Chronic back pain    Chronic fatigue    Degenerative disc disease, lumbar    Diaphragmatic hernia without obstruction or gangrene    Dysphagia    Encounter for screening colonoscopy    Lumbar disc disease with radiculopathy    Lower leg DVT (deep venous thromboembolism), chronic, right    History of iron deficiency    History of DVT (deep vein thrombosis)    Herniated nucleus pulposus of lumbosacral region    Hearing loss    Gastro-esophageal reflux disease without esophagitis    Gastroesophageal reflux disease without esophagitis    Gastric foreign body    Ureteral stone with hydronephrosis    Trigeminal neuralgia    Recurrent kidney stones    Nephrolithiasis    Polyp of stomach and duodenum    Mixed hyperlipidemia    Major depressive disorder, single episode, unspecified    Lumbosacral spondylosis without myelopathy    Maxillary pain    Vitamin D deficiency    Type 2 diabetes mellitus with hyperglycemia    Coronary artery disease of native artery of native heart with stable angina pectoris    Abnormal nuclear stress test    Cervical myelopathy    S/P cervical spinal fusion    Feeding difficulties    Gastrostomy tube dysfunction        Visit Dx:  Dysphagia    Subjective: Pt seen on this date to continue outpatient dysphagia therapy. Pt reports trying new foods (fish, bologna) and that it is going well. Reports globus sensation mid chest. Pt with multiple doctors appointments  upcoming in next few weeks. This is session 17/20.      Objective: NMES targeting suprahyoid muscle activation completed for 30 minutes at 21 mA in conjunction with functional task of effortful swallow and progressive overload. Pt completed 20 swallows with applesauce, and 25 swallows with peaches. Pt encouraged to do multiple swallows and liquid wash if needed. Pt required cueing to keep head in neutral position. HEP and EMST going well and pt still independent.      Plan: Continue outpatient dysphagia therapy, lower frequency - pt comes for outpatient DT 2x/month. Continue HEP with Progressive Overload. Continue NMES. Pt should continue with Mechanical Soft (IDDSI: Minced and Moist)/Thin diet. Pt continues to have primary nutrition and meds through PEG as PO intake is not enough at this time to sustain primary nutrition. Pt scheduled for next appointment February 25, 2025. SLP to request repeat VFSS following completion of all 20 sessions.      LTG1: Patient will tolerate safest and least restrictive diet without complications from aspiration independently by participating in skilled dysphagia therapy targeting pharyngeal strengthening.        STG1: Pt will improve swallow function as noted by improvement with use of VFSS, patient reports, and increased oral intake with varied consistencies to improve overall quality of life by 02/2025.               STG2: Pt to participate in NMES placed on the suprahyoid muscles paired with functional swallowing activities to decrease risk of aspiration and choking by 02/2025.                             STG3: Patient will participate in dysphagia therapy targeting pharyngeal strengthening to improve pharyngeal swallow including, but not limited to, HEP (hard effortful swallow, supraglottic swallow, super supraglottic swallow, CTAR, sabi, EMST-150) and Progressive overload by 02/2025.      Ora Otto, SLP  2/11/2025

## 2025-02-25 ENCOUNTER — HOSPITAL ENCOUNTER (OUTPATIENT)
Dept: SPEECH THERAPY | Facility: HOSPITAL | Age: 75
Setting detail: THERAPIES SERIES
Discharge: HOME | End: 2025-02-25
Payer: MEDICARE

## 2025-02-25 PROCEDURE — 92526 ORAL FUNCTION THERAPY: CPT

## 2025-02-25 NOTE — THERAPY TREATMENT NOTE
Outpatient Speech Language Pathology   Adult Swallow Treatment Note  CHRISTINA Fitzgerald     Patient Name: Gurinder Amezcua  : 1950  MRN: [id number]  Today's Date: 2025         Visit Date: 2025   Patient Active Problem List   Diagnosis    Type 2 diabetes mellitus with diabetic polyneuropathy, with long-term current use of insulin    Dyslipidemia    Acute deep vein thrombosis (DVT) of femoral vein of right lower extremity    Arthritis    Esophageal obstruction    Other esophagitis without bleeding    Cerebral infarction, unspecified    Chronic back pain    Chronic fatigue    Degenerative disc disease, lumbar    Diaphragmatic hernia without obstruction or gangrene    Dysphagia    Encounter for screening colonoscopy    Lumbar disc disease with radiculopathy    Lower leg DVT (deep venous thromboembolism), chronic, right    History of iron deficiency    History of DVT (deep vein thrombosis)    Herniated nucleus pulposus of lumbosacral region    Hearing loss    Gastro-esophageal reflux disease without esophagitis    Gastroesophageal reflux disease without esophagitis    Gastric foreign body    Ureteral stone with hydronephrosis    Trigeminal neuralgia    Recurrent kidney stones    Nephrolithiasis    Polyp of stomach and duodenum    Mixed hyperlipidemia    Major depressive disorder, single episode, unspecified    Lumbosacral spondylosis without myelopathy    Maxillary pain    Vitamin D deficiency    Type 2 diabetes mellitus with hyperglycemia    Coronary artery disease of native artery of native heart with stable angina pectoris    Abnormal nuclear stress test    Cervical myelopathy    S/P cervical spinal fusion    Feeding difficulties    Gastrostomy tube dysfunction        Visit Dx:  Dysphagia     Subjective: Pt seen on this date to continue outpatient dysphagia therapy. Pt reports increasing success with various foods/textures. Pt reports he is taking less nutrition through his PEG tube and is having primary  nutrition by PO. Pt reports continued weight gain, currently 207 lbs. This is session 18/20.      Objective: NMES targeting suprahyoid muscle activation completed for 30 minutes at 20.5 mA in conjunction with functional task of effortful swallow and progressive overload. Pt completed 50 swallows with applesauce, remaining with water. Pt encouraged to do multiple swallows and liquid wash if needed. Pt required cueing to keep head in neutral position. HEP and EMST going well and pt still independent.      Plan: Continue outpatient dysphagia therapy, lower frequency - pt comes for outpatient DT 2x/month. Continue HEP with Progressive Overload. Continue NMES. Pt should continue with Mechanical Soft (IDDSI: Minced and Moist)/Thin diet. Pt continues to have primary nutrition and meds through PEG as PO intake is not enough at this time to sustain primary nutrition. Pt scheduled for next appointment March 11, 2025. SLP to request repeat VFSS following completion of all 20 sessions.      LTG1: Patient will tolerate safest and least restrictive diet without complications from aspiration independently by participating in skilled dysphagia therapy targeting pharyngeal strengthening.        STG1: Pt will improve swallow function as noted by improvement with use of VFSS, patient reports, and increased oral intake with varied consistencies to improve overall quality of life by 02/2025.               STG2: Pt to participate in NMES placed on the suprahyoid muscles paired with functional swallowing activities to decrease risk of aspiration and choking by 02/2025.                             STG3: Patient will participate in dysphagia therapy targeting pharyngeal strengthening to improve pharyngeal swallow including, but not limited to, HEP (hard effortful swallow, supraglottic swallow, super supraglottic swallow, CTAR, sabi, EMST-150) and Progressive overload by 02/2025.      Ora Otto, SLP  2/25/2025

## 2025-03-05 ENCOUNTER — TELEPHONE (OUTPATIENT)
Dept: NEUROSURGERY | Facility: CLINIC | Age: 75
End: 2025-03-05
Payer: MEDICARE

## 2025-03-05 NOTE — TELEPHONE ENCOUNTER
Tried to call patient to let him know we have the papers and are giving them again to . ROSETTE OKAY TO RELAY MESSAGE
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
## 2025-03-05 NOTE — TELEPHONE ENCOUNTER
"    Name: Gurinder Amezcua \"Abimael\"    Relationship: Self    Best Callback Number: [phone]    HUB PROVIDED THE RELAY MESSAGE FROM THE OFFICE   PATIENT VOICED UNDERSTANDING AND HAS NO FURTHER QUESTIONS AT THIS TIME    ADDITIONAL INFORMATION:   "

## 2025-03-11 ENCOUNTER — HOSPITAL ENCOUNTER (OUTPATIENT)
Dept: SPEECH THERAPY | Facility: HOSPITAL | Age: 75
Setting detail: THERAPIES SERIES
Discharge: HOME | End: 2025-03-11
Payer: MEDICARE

## 2025-03-11 PROCEDURE — 92526 ORAL FUNCTION THERAPY: CPT

## 2025-03-11 NOTE — THERAPY TREATMENT NOTE
Outpatient Speech Language Pathology   Adult Swallow Treatment Note  CHRISTINA Fitzgerald     Patient Name: Gurinder Amezcua  : 1950  MRN: [id number]  Today's Date: 3/11/2025         Visit Date: 2025   Patient Active Problem List   Diagnosis    Type 2 diabetes mellitus with diabetic polyneuropathy, with long-term current use of insulin    Dyslipidemia    Acute deep vein thrombosis (DVT) of femoral vein of right lower extremity    Arthritis    Esophageal obstruction    Other esophagitis without bleeding    Cerebral infarction, unspecified    Chronic back pain    Chronic fatigue    Degenerative disc disease, lumbar    Diaphragmatic hernia without obstruction or gangrene    Dysphagia    Encounter for screening colonoscopy    Lumbar disc disease with radiculopathy    Lower leg DVT (deep venous thromboembolism), chronic, right    History of iron deficiency    History of DVT (deep vein thrombosis)    Herniated nucleus pulposus of lumbosacral region    Hearing loss    Gastro-esophageal reflux disease without esophagitis    Gastroesophageal reflux disease without esophagitis    Gastric foreign body    Ureteral stone with hydronephrosis    Trigeminal neuralgia    Recurrent kidney stones    Nephrolithiasis    Polyp of stomach and duodenum    Mixed hyperlipidemia    Major depressive disorder, single episode, unspecified    Lumbosacral spondylosis without myelopathy    Maxillary pain    Vitamin D deficiency    Type 2 diabetes mellitus with hyperglycemia    Coronary artery disease of native artery of native heart with stable angina pectoris    Abnormal nuclear stress test    Cervical myelopathy    S/P cervical spinal fusion    Feeding difficulties    Gastrostomy tube dysfunction        Visit Dx:  Dysphagia     Subjective: Pt seen on this date to continue outpatient dysphagia therapy. Pt continues to try various foods/textures, finding success with various foods. Pt reports he is taking less nutrition through his PEG (2-4  cartons/day). Pt reports increase in trigeminal nerve and neck pain the past 10 days. This is session 19/20.      Objective: NMES targeting suprahyoid muscle activation completed for 30 minutes at 20 mA in conjunction with functional task of effortful swallow. Pt completed with water only on this date, chewing increases trigeminal nerve pain. Pt reports he increased EMST resistance to 90, independent with HEP.      Plan: Continue outpatient dysphagia therapy, lower frequency - pt comes for outpatient DT 2x/month. Continue HEP with Progressive Overload. Continue NMES. Pt should continue with Mechanical Soft (IDDSI: Minced and Moist)/Thin diet. Pt continues to have primary nutrition and meds through PEG as PO intake is not enough at this time to sustain primary nutrition. Pt scheduled for next appointment April 1, 2025. SLP has requested repeat VFSS order from PCP.      LTG1: Patient will tolerate safest and least restrictive diet without complications from aspiration independently by participating in skilled dysphagia therapy targeting pharyngeal strengthening.        STG1: Pt will improve swallow function as noted by improvement with use of VFSS, patient reports, and increased oral intake with varied consistencies to improve overall quality of life by 02/2025.               STG2: Pt to participate in NMES placed on the suprahyoid muscles paired with functional swallowing activities to decrease risk of aspiration and choking by 02/2025.                             STG3: Patient will participate in dysphagia therapy targeting pharyngeal strengthening to improve pharyngeal swallow including, but not limited to, HEP (hard effortful swallow, supraglottic swallow, super supraglottic swallow, CTAR, sabi, EMST-150) and Progressive overload by 02/2025.      Ora Otto SLP  3/11/2025

## 2025-03-25 ENCOUNTER — OFFICE VISIT (OUTPATIENT)
Dept: CARDIOLOGY | Facility: CLINIC | Age: 75
End: 2025-03-25
Payer: MEDICARE

## 2025-03-25 VITALS
HEIGHT: 77 IN | DIASTOLIC BLOOD PRESSURE: 75 MMHG | OXYGEN SATURATION: 97 % | BODY MASS INDEX: 24.68 KG/M2 | WEIGHT: 209 LBS | SYSTOLIC BLOOD PRESSURE: 120 MMHG | HEART RATE: 77 BPM

## 2025-03-25 DIAGNOSIS — E11.42 TYPE 2 DIABETES MELLITUS WITH DIABETIC POLYNEUROPATHY, WITH LONG-TERM CURRENT USE OF INSULIN: ICD-10-CM

## 2025-03-25 DIAGNOSIS — Z79.4 TYPE 2 DIABETES MELLITUS WITH DIABETIC POLYNEUROPATHY, WITH LONG-TERM CURRENT USE OF INSULIN: ICD-10-CM

## 2025-03-25 DIAGNOSIS — I25.118 CORONARY ARTERY DISEASE OF NATIVE ARTERY OF NATIVE HEART WITH STABLE ANGINA PECTORIS: Primary | ICD-10-CM

## 2025-03-25 DIAGNOSIS — I10 PRIMARY HYPERTENSION: ICD-10-CM

## 2025-03-25 DIAGNOSIS — E78.00 PURE HYPERCHOLESTEROLEMIA: ICD-10-CM

## 2025-03-25 PROCEDURE — 99214 OFFICE O/P EST MOD 30 MIN: CPT | Performed by: INTERNAL MEDICINE

## 2025-03-25 PROCEDURE — 1160F RVW MEDS BY RX/DR IN RCRD: CPT | Performed by: INTERNAL MEDICINE

## 2025-03-25 PROCEDURE — 1159F MED LIST DOCD IN RCRD: CPT | Performed by: INTERNAL MEDICINE

## 2025-03-25 PROCEDURE — G2211 COMPLEX E/M VISIT ADD ON: HCPCS | Performed by: INTERNAL MEDICINE

## 2025-03-25 RX ORDER — METOPROLOL SUCCINATE 25 MG/1
25 TABLET, EXTENDED RELEASE ORAL DAILY
Qty: 90 TABLET | Refills: 3 | Status: SHIPPED | OUTPATIENT
Start: 2025-03-25

## 2025-03-25 RX ORDER — NITROGLYCERIN 0.4 MG/1
TABLET SUBLINGUAL
Qty: 25 TABLET | Refills: 0 | Status: SHIPPED | OUTPATIENT
Start: 2025-03-25

## 2025-03-25 NOTE — PROGRESS NOTES
Subjective:     Encounter Date:03/25/2025      Patient ID: Gurinder Amezcua is a 75 y.o. male.    Chief Complaint:  History of Present Illness 75-year-old white male with history of coronary disease diabetes hypertension hyperlipidemia presents to my office for a follow-up.  Patient is currently stable without any symptoms of chest pain or shortness of breath at rest or exertion.  No complaint of any PND orthopnea.  No palpitations dizziness syncope or swelling of the feet.  Patient is taking all the medicines regularly.  Patient does not smoke.    The following portions of the patient's history were reviewed and updated as appropriate: allergies, current medications, past family history, past medical history, past social history, past surgical history, and problem list.  Past Medical History:   Diagnosis Date    Ankylosing spondylitis of site in spine 1998    Spine surgery    Anxiety     Carotid artery occlusion     Cataracts, bilateral 04/2022    Cervical disc disorder     Chronic pain disorder     Coronary artery disease of native artery of native heart with stable angina pectoris 12/28/2023    Deep vein thrombosis 2018 approx    none since    Depression 6/19    Death of wife    Diabetes mellitus 2017 approx    Type 2    Headache Always    Heart attack 06/17/2021    Hypertension     Low back pain     Lumbosacral disc disease     Migraine Unknown    Mixed hyperlipidemia 03/08/2018    Neck pain Feb 2023    Peripheral neuropathy     Rheumatoid arthritis Unknown    Shingles     Spinal stenosis     Thoracic disc disorder     Trigeminal neuralgia     Ureteral stone with hydronephrosis 11/24/2016     Past Surgical History:   Procedure Laterality Date    ANTERIOR CERVICAL DISCECTOMY W/ FUSION N/A 6/6/2024    Procedure: Cervical 3-5 anterior cervical discectomy and fusion;  Surgeon: Bang Mirza IV, MD;  Location: [facility];  Service: Neurosurgery;  Laterality: N/A;    BACK SURGERY  2019    4 rods in back    BRAIN  "SURGERY      CARDIAC CATHETERIZATION  2021    CARDIAC CATHETERIZATION N/A 2024    Procedure: Left Heart Cath with angiogram;  Surgeon: Akin Brown MD;  Location: [facility] CATH INVASIVE LOCATION;  Service: Cardiovascular;  Laterality: N/A;    CARDIAC CATHETERIZATION N/A 2024    Procedure: Left ventriculography;  Surgeon: Akin Brown MD;  Location: [facility] CATH INVASIVE LOCATION;  Service: Cardiovascular;  Laterality: N/A;    CAROTID STENT      CORONARY STENT PLACEMENT  2021    CRANIOTOMY      CYBERKNIFE      ENDOSCOPY      ENDOSCOPY W/ PEG TUBE PLACEMENT N/A 2024    Procedure: ESOPHAGOGASTRODUODENOSCOPY WITH PERCUTANEOUS ENDOSCOPIC GASTROSTOMY TUBE INSERTION;  Surgeon: Niranjan Rich MD;  Location: [facility] ENDOSCOPY;  Service: Gastroenterology;  Laterality: N/A;  post op:    EPIDURAL BLOCK      HERNIA REPAIR      KNEE ARTHROSCOPY      LAMINECTOMY      NECK SURGERY  Unknown    SPINAL CORD STIMULATOR IMPLANT  Previously    SPINAL FUSION  Unknown    SPINE SURGERY      TRIGEMINAL NERVE DECOMPRESSION      TRIGGER POINT INJECTION       /75   Pulse 77   Ht 195.6 cm (77\")   Wt 94.8 kg (209 lb)   SpO2 97%   BMI 24.78 kg/m²   Family History   Problem Relation Age of Onset    Diabetes Mother             Arthritis Mother     Heart attack Father             Aneurysm Father        Current Outpatient Medications:     aspirin 81 MG EC tablet, Take 1 tablet by mouth Daily., Disp: , Rfl:     atorvastatin (LIPITOR) 80 MG tablet, Take 1 tablet by mouth Daily., Disp: , Rfl:     Insulin Glargine, 2 Unit Dial, (Toujeo Max SoloStar) 300 UNIT/ML solution pen-injector injection, ADMINISTER 16 UNITS UNDER THE SKIN TWICE DAILY, Disp: 6 mL, Rfl: 1    metoprolol succinate XL (TOPROL-XL) 25 MG 24 hr tablet, Take 1 tablet by mouth Daily., Disp: 90 tablet, Rfl: 3  Allergies   Allergen Reactions    Adhesive Tape Itching     tegraderm patches causes itching    Latex Unknown - High " Severity    Methocarbamol Itching and Rash     Arms etc. No hives.       Pregabalin Confusion and Other (See Comments)     Hypnotic driving episode  Dizziness and inability to focus  Dizziness and inability to focus  Other reaction(s): Confusion  lyrica      Silver Rash     Social History     Socioeconomic History    Marital status:    Tobacco Use    Smoking status: Never     Passive exposure: Never    Smokeless tobacco: Never   Vaping Use    Vaping status: Never Used   Substance and Sexual Activity    Alcohol use: Not Currently     Alcohol/week: 4.0 standard drinks of alcohol     Comment: or less per week    Drug use: Never    Sexual activity: Never     Review of Systems   Constitutional: Negative for malaise/fatigue.   Cardiovascular:  Negative for chest pain, dyspnea on exertion, leg swelling and palpitations.   Respiratory:  Negative for cough and shortness of breath.    Gastrointestinal:  Negative for abdominal pain, nausea and vomiting.   Neurological:  Negative for dizziness, focal weakness, headaches, light-headedness and numbness.   All other systems reviewed and are negative.             Objective:     Constitutional:       Appearance: Well-developed.   Eyes:      General: No scleral icterus.     Conjunctiva/sclera: Conjunctivae normal.   HENT:      Head: Normocephalic and atraumatic.   Neck:      Vascular: No carotid bruit or JVD.   Pulmonary:      Effort: Pulmonary effort is normal.      Breath sounds: Normal breath sounds. No wheezing. No rales.   Cardiovascular:      Normal rate. Regular rhythm.   Pulses:     Intact distal pulses.   Abdominal:      General: Bowel sounds are normal.      Palpations: Abdomen is soft.   Musculoskeletal:      Cervical back: Normal range of motion and neck supple. Skin:     General: Skin is warm and dry.      Findings: No rash.   Neurological:      Mental Status: Alert.       Procedures    Lab Review:         MDM    #1 coronary disease  Patient had 90% disease in the  ramus intermedius which is a small branch and hence he is on medical therapy and does not have any angina and has normal LV function  2.  Hypertension  Patient's blood pressure currently stable on metoprolol  3.  Diabetes  Patient is on insulin and followed by the primary care doctor  4.  Hyperlipidemia  Patient on atorvastatin and the lipid levels are well within normal meds    Patient's previous medical records, labs, and EKG were reviewed and discussed with the patient at today's visit.

## 2025-04-01 ENCOUNTER — TRANSCRIBE ORDERS (OUTPATIENT)
Dept: SPEECH THERAPY | Facility: HOSPITAL | Age: 75
End: 2025-04-01
Payer: MEDICARE

## 2025-04-01 ENCOUNTER — HOSPITAL ENCOUNTER (OUTPATIENT)
Dept: SPEECH THERAPY | Facility: HOSPITAL | Age: 75
Setting detail: THERAPIES SERIES
Discharge: HOME | End: 2025-04-01
Payer: MEDICARE

## 2025-04-01 DIAGNOSIS — R13.10 DYSPHAGIA, UNSPECIFIED TYPE: Primary | ICD-10-CM

## 2025-04-01 PROCEDURE — 92526 ORAL FUNCTION THERAPY: CPT

## 2025-04-01 NOTE — THERAPY TREATMENT NOTE
Outpatient Speech Language Pathology   Adult Swallow Treatment Note  CHRISTINA Fitzgerald     Patient Name: Gurinder Amezcua  : 1950  MRN: [id number]  Today's Date: 2025         Visit Date: 2025   Patient Active Problem List   Diagnosis    Type 2 diabetes mellitus with diabetic polyneuropathy, with long-term current use of insulin    Dyslipidemia    Acute deep vein thrombosis (DVT) of femoral vein of right lower extremity    Arthritis    Esophageal obstruction    Other esophagitis without bleeding    Cerebral infarction, unspecified    Chronic back pain    Chronic fatigue    Degenerative disc disease, lumbar    Diaphragmatic hernia without obstruction or gangrene    Dysphagia    Encounter for screening colonoscopy    Lumbar disc disease with radiculopathy    Lower leg DVT (deep venous thromboembolism), chronic, right    History of iron deficiency    History of DVT (deep vein thrombosis)    Herniated nucleus pulposus of lumbosacral region    Hearing loss    Gastro-esophageal reflux disease without esophagitis    Gastroesophageal reflux disease without esophagitis    Gastric foreign body    Ureteral stone with hydronephrosis    Trigeminal neuralgia    Recurrent kidney stones    Nephrolithiasis    Polyp of stomach and duodenum    Mixed hyperlipidemia    Major depressive disorder, single episode, unspecified    Lumbosacral spondylosis without myelopathy    Maxillary pain    Vitamin D deficiency    Type 2 diabetes mellitus with hyperglycemia    Coronary artery disease of native artery of native heart with stable angina pectoris    Abnormal nuclear stress test    Cervical myelopathy    S/P cervical spinal fusion    Feeding difficulties    Gastrostomy tube dysfunction        Subjective: Pt seen on this date to continue outpatient dysphagia therapy. Pt reports he continues to try and modify foods with success. Pt with multiple appointments in the past few weeks. This is session .      Objective: NMES targeting  suprahyoid muscle activation completed for 30 minutes at 19.5 mA in conjunction with functional task of effortful swallow with and without water. Pt continues to be independent with HEP and EMST, reports all is going well. Discussion over foods/liquids he has been trying, pt reports success with various foods and states he modifies some to work better. Trigeminal nerve pain still impacting chewing.      Plan: Repeat VFSS 04/03/2025, progress report to follow.      LTG1: Patient will tolerate safest and least restrictive diet without complications from aspiration independently by participating in skilled dysphagia therapy targeting pharyngeal strengthening.        STG1: Pt will improve swallow function as noted by improvement with use of VFSS, patient reports, and increased oral intake with varied consistencies to improve overall quality of life by 02/2025.               STG2: Pt to participate in NMES placed on the suprahyoid muscles paired with functional swallowing activities to decrease risk of aspiration and choking by 02/2025.                             STG3: Patient will participate in dysphagia therapy targeting pharyngeal strengthening to improve pharyngeal swallow including, but not limited to, HEP (hard effortful swallow, supraglottic swallow, super supraglottic swallow, CTAR, sabi, EMST-150) and Progressive overload by 02/2025.        Ora Otto, SLP  4/1/2025

## 2025-04-03 ENCOUNTER — HOSPITAL ENCOUNTER (OUTPATIENT)
Dept: GENERAL RADIOLOGY | Facility: HOSPITAL | Age: 75
Discharge: HOME | End: 2025-04-03
Admitting: FAMILY MEDICINE
Payer: MEDICARE

## 2025-04-03 DIAGNOSIS — R13.10 DYSPHAGIA, UNSPECIFIED TYPE: ICD-10-CM

## 2025-04-03 PROCEDURE — 74230 X-RAY XM SWLNG FUNCJ C+: CPT

## 2025-04-03 PROCEDURE — 63710000001 BARIUM SULFATE 60 % CREAM: Performed by: FAMILY MEDICINE

## 2025-04-03 PROCEDURE — 92611 MOTION FLUOROSCOPY/SWALLOW: CPT

## 2025-04-03 PROCEDURE — A9270 NON-COVERED ITEM OR SERVICE: HCPCS | Performed by: FAMILY MEDICINE

## 2025-04-03 PROCEDURE — 63710000001 BARIUM SULFATE 40 % SUSPENSION: Performed by: FAMILY MEDICINE

## 2025-04-03 RX ADMIN — BARIUM SULFATE 50 ML: 400 SUSPENSION ORAL at 09:24

## 2025-04-03 RX ADMIN — BARIUM SULFATE 1 TEASPOON(S): 0.6 CREAM ORAL at 09:24

## 2025-04-03 NOTE — MBS/VFSS/FEES
Outpatient Speech Language Pathology   Adult Swallow Re-Evaluation  CHRISTINA Fitzgerald     Patient Name: Gurinder Amezcua  : 1950  MRN: [id number]  Today's Date: 4/3/2025         Visit Date: 2025   Patient Active Problem List   Diagnosis    Type 2 diabetes mellitus with diabetic polyneuropathy, with long-term current use of insulin    Dyslipidemia    Acute deep vein thrombosis (DVT) of femoral vein of right lower extremity    Arthritis    Esophageal obstruction    Other esophagitis without bleeding    Cerebral infarction, unspecified    Chronic back pain    Chronic fatigue    Degenerative disc disease, lumbar    Diaphragmatic hernia without obstruction or gangrene    Dysphagia    Encounter for screening colonoscopy    Lumbar disc disease with radiculopathy    Lower leg DVT (deep venous thromboembolism), chronic, right    History of iron deficiency    History of DVT (deep vein thrombosis)    Herniated nucleus pulposus of lumbosacral region    Hearing loss    Gastro-esophageal reflux disease without esophagitis    Gastroesophageal reflux disease without esophagitis    Gastric foreign body    Ureteral stone with hydronephrosis    Trigeminal neuralgia    Recurrent kidney stones    Nephrolithiasis    Polyp of stomach and duodenum    Mixed hyperlipidemia    Major depressive disorder, single episode, unspecified    Lumbosacral spondylosis without myelopathy    Maxillary pain    Vitamin D deficiency    Type 2 diabetes mellitus with hyperglycemia    Coronary artery disease of native artery of native heart with stable angina pectoris    Abnormal nuclear stress test    Cervical myelopathy    S/P cervical spinal fusion    Feeding difficulties    Gastrostomy tube dysfunction        Past Medical History:   Diagnosis Date    Ankylosing spondylitis of site in spine     Spine surgery    Anxiety     Carotid artery occlusion     Cataracts, bilateral 2022    Cervical disc disorder     Chronic pain disorder     Coronary artery  disease of native artery of native heart with stable angina pectoris 12/28/2023    Deep vein thrombosis 2018 approx    none since    Depression 6/19    Death of wife    Diabetes mellitus 2017 approx    Type 2    Headache Always    Heart attack 06/17/2021    Hypertension     Low back pain     Lumbosacral disc disease     Migraine Unknown    Mixed hyperlipidemia 03/08/2018    Neck pain Feb 2023    Peripheral neuropathy     Rheumatoid arthritis Unknown    Shingles     Spinal stenosis     Thoracic disc disorder     Trigeminal neuralgia     Ureteral stone with hydronephrosis 11/24/2016        Past Surgical History:   Procedure Laterality Date    ANTERIOR CERVICAL DISCECTOMY W/ FUSION N/A 6/6/2024    Procedure: Cervical 3-5 anterior cervical discectomy and fusion;  Surgeon: Bang Mirza IV, MD;  Location: [facility] MAIN OR;  Service: Neurosurgery;  Laterality: N/A;    BACK SURGERY  2019    4 rods in back    BRAIN SURGERY      CARDIAC CATHETERIZATION  06/17/2021    CARDIAC CATHETERIZATION N/A 01/02/2024    Procedure: Left Heart Cath with angiogram;  Surgeon: Akin Brown MD;  Location: [facility] CATH INVASIVE LOCATION;  Service: Cardiovascular;  Laterality: N/A;    CARDIAC CATHETERIZATION N/A 01/02/2024    Procedure: Left ventriculography;  Surgeon: Akin Brown MD;  Location: [facility] CATH INVASIVE LOCATION;  Service: Cardiovascular;  Laterality: N/A;    CAROTID STENT      CORONARY STENT PLACEMENT  06/17/2021    CRANIOTOMY      CYBERKNIFE      ENDOSCOPY      ENDOSCOPY W/ PEG TUBE PLACEMENT N/A 6/11/2024    Procedure: ESOPHAGOGASTRODUODENOSCOPY WITH PERCUTANEOUS ENDOSCOPIC GASTROSTOMY TUBE INSERTION;  Surgeon: Niranjan Rich MD;  Location: [facility] ENDOSCOPY;  Service: Gastroenterology;  Laterality: N/A;  post op:    EPIDURAL BLOCK      HERNIA REPAIR      KNEE ARTHROSCOPY      LAMINECTOMY      NECK SURGERY  Unknown    SPINAL CORD STIMULATOR IMPLANT  Previously    SPINAL FUSION  Unknown    SPINE SURGERY      TRIGEMINAL  NERVE DECOMPRESSION      TRIGGER POINT INJECTION           Visit Dx:     ICD-10-CM ICD-9-CM   1. Dysphagia, unspecified type  R13.10 787.20     Subjective: Pt here for repeat VFSS to gain objective data of dysphagia therapy progress following additional 10 sessions. Pt with previous VFSS in 06/2024 with the recommendation to remain NPO w/ PEG feedings and another VFSS 10/2024 with recommendation of Mechanical Soft / Thins and continuation of PEG feedings. Since VFSS, pt reports he takes primary nutrition PO with supplemental PEG nutrition. Pt has also reported weight gain since returning to PO intake.   Previous Diet: Mechanical Soft / Thin liquids, Supplemental PEG feedings (1-2 cartons/day)  Patient Positioning: Sitting 90 degrees.  Viewing Plane: Lateral   Contrast: Barium used across trial textures; administered via spoon and cup. The patient was assessed with the following consistencies: NTL by cup x2, Thins by cup x3, puree (applesauce) x2, soft mixed consistency (peaches in juice) x2, Fig perdomo x1. Pt able to hold cup and take drinks independently.      Overall Impressions: VFSS completed on this date to obtain objective data on pt progress following an additional 10 sessions of dysphagia therapy (20 sessions total). Pt oral phase found WFL, pharyngeal phase found functional with compensations. Pt swallow has greatly improved from initial VFSS on 6/10/2024 and repeat VFSS on 10/31/2024. It is recommended pt initiate a Regular/soft solids / Thin liquids diet with meds as tolerated. Pt should continue to utilize safe swallowing strategies: Effortful swallow, double swallows, sitting up for PO intake, alternating liquids/solids, small sips/bites, head in neutral position, adding moisture to meals. Swallow remained consistent throughout VFSS, however pt cautioned to monitor fatigue and eat smaller more frequent meals if needed. Pt also encouraged to continue HEP to maintain progress made to date. Pt encouraged  to discuss with PCP and GI to determine next steps for PEG and supplemental nutrition. Discussion with pt about progress and current recommendations - verbalized understanding, all questions answered. At this time, pt has made great progress and met all therapy goals, no further ST services warranted at this time.      Oral Phase found grossly WFL - lip closure, tongue control, mastication, bolus transport, and oral clearance were all functional.      Pharyngeal phase functional WITH compensations: initiation of swallow intermittently delayed, bolus in vallecula. Soft palate and laryngeal elevation, epiglottic deflection, anterior hyoid excursion, tongue base retraction, PES clearance, pharyngeal contraction all WFL. Pt pharyngeal stripping wave reduced causing mild-mod pharyngeal residues that clear given naturally produced double swallow. Effortful swallow, double swallow, liquid wash, and R lean compensations improved pt swallow, pt able to utilize independently. Esophageal scan unremarkable. Pt scored a 1 on the Penetration Aspiration Scale (PAS), details below.      Penetration Aspiration Scale (PAS) is an eight-point scale used to assess the severity of penetration or aspiration observed during a VFSS/MBSS. The scores are as follows:   No Penetration or Aspiration: Material does not enter the airway.  Penetration, Contrast Remains Above Vocal Folds, Subsequently Ejected: Material enters the larynx but stays above the vocal folds and is later ejected.   Penetration, Contrast Remains Above Vocal folds, Not Ejected: Material enters the larynx but remains above the vocal folds without being ejected.   Penetration, Contrast Contacts Vocal Folds, Subsequently Ejected: Material contacts the vocal folds and is later ejected.  Penetration, Contrast Contacts Vocal Folds, Not Ejected: Material contacts the vocal folds but is not ejected.  Aspiration (Contrast Below Vocal Folds), Subsequently Ejected (At Least into  Larynx): Material passes below the vocal folds and is later ejected.  Aspiration (Contrast Below Vocal Folds), Not Ejected Despite Effort: Material passes below the vocal folds but is not ejected despite effort.        8. Aspiration (Contrast Below Vocal Folds), No Effort Made to Eject: Material passes below the vocal folds, and no effort is made to eject it.         SLP Recommendation and Plan    -Regular/soft solids / Thin liquids    -Safe Swallow Strategies: Effortful swallow, double swallows, sitting up for PO intake, alternating liquids/solids, small sips/bites, head in neutral position, add moisture to meals.     -Discuss with PCP and GI regarding PEG/supplemental nutrition.     -Pt ready to d/c from ST services, please re-consult if indicated.        Dysphagia Therapy Goals: All MET, ready to d/c from  services.      LTG1: Patient will tolerate safest and least restrictive diet without complications from aspiration independently by participating in skilled dysphagia therapy targeting pharyngeal strengthening.               PROGRESS: MET     STG1: Patient will participate in dysphagia therapy targeting pharyngeal strengthening to improve pharyngeal swallow with minimal cues, including but not limited to: hard effortful swallow, supraglottic swallow, CTAR, sabi, EMST.               PROGRESS: MET     STG2: Pt will improve swallow function as noted by improvement with use of VFSS, patient reports, and increased oral intake with varied consistencies to improve overall quality of life by 02/2025.              PROGRESS: MET     STG3: Pt to participate in NMES placed on the suprahyoid muscles paired with functional swallowing activities to decrease risk of aspiration and choking by 02/2025.              PROGRESS: MET                STG4: Patient will participate in dysphagia therapy targeting pharyngeal strengthening to improve pharyngeal swallow including, but not limited to, HEP (hard effortful swallow,  supraglottic swallow, super supraglottic swallow, CTAR, sabi, EMST-150) and progressive overload by 02/2025.                          PROGRESS: MET     SLP Adult Swallow Evaluation       Row Name 04/03/25 1300       Rehab Evaluation    Document Type evaluation  -AA    Subjective Information no complaints  -AA    Patient Observations alert;cooperative;agree to therapy  -AA    Patient Effort good  -AA    Symptoms Noted During/After Treatment none  -AA       General Information    Patient Profile Reviewed yes  -AA    Pertinent History Of Current Problem Pt here for repeat VFSS to gain objective data of dysphagia therapy progress to date. Pt with previous VFSS in 08/2024 with the recommendation to remain NPO w/ PEG feedings. Repeat VFSS completed following 8 weeks of dysphagia therapy with the recommendation of Mechanical Soft/Thins. Pt has participated in 10 more sessions of dysphagia therapy.  -AA    Current Method of Nutrition thin liquids;mechanical ground textures;gastrostomy feedings  -AA    Precautions/Limitations, Vision WFL with corrective lenses;for purposes of eval  -AA    Precautions/Limitations, Hearing hearing impairment, bilaterally  -AA    Prior Level of Function-Communication WFL  -AA    Prior Level of Function-Swallowing thin liquids;mechanical ground textures;alternative feeding method;compensations (maneuvers, postures) needed  -AA    Plans/Goals Discussed with patient;agreed upon  -AA    Barriers to Rehab previous functional deficit  -AA       Oral Motor Structure and Function    Dentition Assessment natural, present and adequate  -AA    Secretion Management WNL/WFL  -AA    Mucosal Quality moist, healthy  -AA    Volitional Swallow WFL  -AA    Volitional Cough WFL  -AA       Oral Musculature and Cranial Nerve Assessment    Oral Motor General Assessment WFL  -AA       General Eating/Swallowing Observations    Respiratory Support Currently in Use room air  -AA    Eating/Swallowing Skills self-fed   -AA    Positioning During Eating upright 90 degree;upright in chair  -AA       MBS/VFSS    Utensils Used spoon;cup  -AA    Consistencies Trialed nectar/syrup-thick liquids;thin liquids;pureed;mixed consistency;mechanical ground textures;soft to chew textures  -AA       MBS/VFSS Interpretation    Oral Prep Phase WFL  -AA    Oral Transit Phase WFL  -AA    Oral Residue WFL  -AA       SLP Evaluation Clinical Impression    SLP Swallowing Diagnosis functional oral phase  functional pharyngeal phase with compensations.  -AA    Swallow Criteria for Skilled Therapeutic Interventions Met no problems identified which require skilled intervention  -AA       Recommendations    Therapy Frequency (Swallow) evaluation only  -AA    SLP Diet Recommendation soft to chew textures;regular textures;thin liquids;other (see comments)  Discuss with PCP and GI reguaring PEG tube/supplemental nutrition.  -AA    Recommended Precautions and Strategies upright posture during/after eating;small bites of food and sips of liquid;multiple swallows per bite of food;multiple swallows per sip of liquid;alternate between small bites of food and sips of liquid;hard swallow with each bite or sip  -AA    Oral Care Recommendations Oral Care BID/PRN;Other (see comments)  Keep regular dentist appointments.  -AA    SLP Rec. for Method of Medication Administration as tolerated  -AA              User Key  (r) = Recorded By, (t) = Taken By, (c) = Cosigned By      Initials Name Provider Type    Ora Ugalde SLP Speech and Language Pathologist                                                 Time Calculation:                     RONEY Velazquez  4/3/2025

## 2025-04-08 DIAGNOSIS — Z79.4 TYPE 2 DIABETES MELLITUS WITH DIABETIC POLYNEUROPATHY, WITH LONG-TERM CURRENT USE OF INSULIN: ICD-10-CM

## 2025-04-08 DIAGNOSIS — E11.42 TYPE 2 DIABETES MELLITUS WITH DIABETIC POLYNEUROPATHY, WITH LONG-TERM CURRENT USE OF INSULIN: ICD-10-CM

## 2025-04-08 RX ORDER — INSULIN GLARGINE 300 U/ML
INJECTION, SOLUTION SUBCUTANEOUS
Qty: 6 ML | Refills: 1 | Status: SHIPPED | OUTPATIENT
Start: 2025-04-08

## 2025-04-30 ENCOUNTER — LAB (OUTPATIENT)
Dept: ENDOCRINOLOGY | Facility: CLINIC | Age: 75
End: 2025-04-30
Payer: MEDICARE

## 2025-05-07 ENCOUNTER — OFFICE VISIT (OUTPATIENT)
Dept: ENDOCRINOLOGY | Facility: CLINIC | Age: 75
End: 2025-05-07
Payer: MEDICARE

## 2025-05-07 VITALS
WEIGHT: 206.6 LBS | HEIGHT: 77 IN | HEART RATE: 88 BPM | BODY MASS INDEX: 24.39 KG/M2 | OXYGEN SATURATION: 98 % | DIASTOLIC BLOOD PRESSURE: 60 MMHG | SYSTOLIC BLOOD PRESSURE: 120 MMHG

## 2025-05-07 DIAGNOSIS — E11.42 TYPE 2 DIABETES MELLITUS WITH DIABETIC POLYNEUROPATHY, WITH LONG-TERM CURRENT USE OF INSULIN: Primary | ICD-10-CM

## 2025-05-07 DIAGNOSIS — Z79.4 TYPE 2 DIABETES MELLITUS WITH DIABETIC POLYNEUROPATHY, WITH LONG-TERM CURRENT USE OF INSULIN: Primary | ICD-10-CM

## 2025-05-07 DIAGNOSIS — E55.9 VITAMIN D DEFICIENCY: ICD-10-CM

## 2025-05-07 DIAGNOSIS — E78.2 MIXED HYPERLIPIDEMIA: ICD-10-CM

## 2025-05-07 PROCEDURE — 99214 OFFICE O/P EST MOD 30 MIN: CPT | Performed by: INTERNAL MEDICINE

## 2025-05-07 PROCEDURE — 3046F HEMOGLOBIN A1C LEVEL >9.0%: CPT | Performed by: INTERNAL MEDICINE

## 2025-05-07 RX ORDER — ACYCLOVIR 400 MG/1
1 TABLET ORAL
Qty: 9 EACH | Refills: 3 | Status: SHIPPED | OUTPATIENT
Start: 2025-05-07

## 2025-05-07 RX ORDER — INSULIN ASPART INJECTION 100 [IU]/ML
INJECTION, SOLUTION SUBCUTANEOUS
Start: 2025-05-07

## 2025-05-07 RX ORDER — DULAGLUTIDE 0.75 MG/.5ML
INJECTION, SOLUTION SUBCUTANEOUS
COMMUNITY
End: 2025-05-07

## 2025-05-07 RX ORDER — INSULIN GLARGINE 300 U/ML
INJECTION, SOLUTION SUBCUTANEOUS
Qty: 12 ML | Refills: 3 | Status: SHIPPED | OUTPATIENT
Start: 2025-05-07

## 2025-05-07 NOTE — PATIENT INSTRUCTIONS
Continue exercise as able.  Increase Toujeo to 20 units 2 x / d.  Take Fiasp 10 units if bl sugar is over 300.  Continue atorvastatin.  Call if blood sugars are running under 100 or over 200.  F/u in 6 months, with labs prior.

## 2025-05-12 NOTE — PROGRESS NOTES
[facility]        Patient Care Team:  Marissa Prasad MD as PCP - General (Family Medicine)  Akin Brown MD as Consulting Physician (Cardiology)    Chief Complaint:    Chief Complaint   Patient presents with   • Diabetes         Subjective   Here for diabetes f/u  Blood sugars: did not bring records  Exercise program: using a cane  Taking atorvastatin    Interval History:     Patient Comments:  had EGD on 5/5  G tube since 4/2024 -severe oropharyngeal dysfunction  Patient Denies: hypoglycemia  History taken from: patient    Review of Systems:   Review of Systems   HENT:  Positive for trouble swallowing.    Eyes:  Negative for blurred vision.   Gastrointestinal:  Positive for nausea.   Endocrine: Negative for polyuria.   Musculoskeletal:  Positive for gait problem.   Neurological:  Negative for headache.   Psychiatric/Behavioral:  Positive for depressed mood.         Irritable   Gained 4 lb since last visit    Objective     Vital Signs     Vitals:    05/07/25 1319   BP: 120/60   Pulse: 88   SpO2: 98%         Physical Exam:     General Appearance:    Alert, cooperative, in no acute distress   Head:    Normocephalic, without obvious abnormality, atraumatic   Eyes:            Lids and lashes normal, conjunctivae and sclerae normal, no   icterus, no pallor, corneas clear, PERRLA   Throat:   No oral lesions,  oral mucosa moist   Neck:   No adenopathy, supple,  no thyromegaly, no carotid bruit   Lungs:     Clear    Heart:    Regular rhythm and normal rate   Chest Wall:    No abnormalities observed   Abdomen:     Normal bowel sounds, soft                 Extremities:   Moves all extremities well, no edema               Pulses:   Pulses palpable and equal bilaterally   Skin:   Dry   Neurologic:  DTR absent, able to feel the 10g monofilament, except in toes          Results Review:    I have reviewed the patient's new clinical results, labs & imaging.    Medication Review:   Prior to Admission  medications    Medication Sig Start Date End Date Taking? Authorizing Provider   aspirin 81 MG EC tablet Take 1 tablet by mouth Daily. 6/12/24  Yes Erum Wilkinson PA-C   atorvastatin (LIPITOR) 80 MG tablet Take 1 tablet by mouth Daily.   Yes ProviderHailey MD   Insulin Glargine, 2 Unit Dial, (Toujeo Max SoloStar) 300 UNIT/ML solution pen-injector injection ADMINISTER 20 UNITS UNDER THE SKIN TWICE DAILY 5/7/25  Yes La Mccormick MD   metoprolol succinate XL (TOPROL-XL) 25 MG 24 hr tablet Take 1 tablet by mouth Daily. 3/25/25  Yes Akin Brown MD   nitroglycerin (NITROSTAT) 0.4 MG SL tablet 1 under the tongue as needed for angina, may repeat q5mins for up three doses 3/25/25  Yes Akin Brown MD   Continuous Glucose Sensor (Dexcom G7 Sensor) misc Use 1 Device Every 10 (Ten) Days. 5/7/25   La Mccormick MD   Insulin Aspart, w/Niacinamide, (Fiasp FlexTouch) 100 UNIT/ML solution pen-injector Inject 10 units if bl sugar is >300. 5/7/25   La Mccormick MD         Lab Results   Component Value Date    HGBA1C >15.5 (H) 04/30/2025    HGBA1C 8.80 (H) 05/28/2024    HGBA1C 9.70 (H) 12/30/2023      Lab Results   Component Value Date    GLUCOSE 235 (H) 04/30/2025    BUN 12 04/30/2025    CREATININE 0.85 04/30/2025    BCR 14 04/30/2025    K 4.1 04/30/2025    CO2 22 04/30/2025    CALCIUM 9.1 04/30/2025    ALBUMIN 4.0 04/30/2025    AST 35 04/30/2025    ALT 32 04/30/2025    CHOL 117 12/30/2023    LDL 53 04/30/2025    HDL 50 04/30/2025    TRIG 50 04/30/2025     Lab Results   Component Value Date    TSH 0.894 04/30/2025    MYUP65SN 30.2 04/30/2025     Microalb/cr ratio 25    Assessment & Plan     Diagnoses and all orders for this visit:    1. Type 2 diabetes mellitus with diabetic polyneuropathy, with long-term current use of insulin (Primary)  -     Insulin Glargine, 2 Unit Dial, (Toujeo Max SoloStar) 300 UNIT/ML solution pen-injector injection; ADMINISTER 20 UNITS UNDER THE SKIN TWICE DAILY  Dispense: 12 mL;  Refill: 3  -     Insulin Aspart, w/Niacinamide, (Fiasp FlexTouch) 100 UNIT/ML solution pen-injector; Inject 10 units if bl sugar is >300.  -     Hemoglobin A1c; Future  -     Comprehensive Metabolic Panel; Future  -     Continuous Glucose Sensor (Dexcom G7 Sensor) misc; Use 1 Device Every 10 (Ten) Days.  Dispense: 9 each; Refill: 3    2. Vitamin D deficiency    3. Mixed hyperlipidemia    Glucose control worse. Lipids & vit D stable.    Continue exercise as able.  Increase Toujeo to 20 units 2 x / d.  Take Fiasp 10 units if bl sugar is over 300. Sample pen given to pt.  Continue atorvastatin.  Call if blood sugars are running under 100 or over 200.  Rx for DexCom G 7 CGMS.        La Mccormick MD  05/12/25  16:25 EDT

## 2025-05-29 ENCOUNTER — TELEPHONE (OUTPATIENT)
Dept: ENDOCRINOLOGY | Facility: CLINIC | Age: 75
End: 2025-05-29
Payer: MEDICARE

## 2025-07-03 NOTE — PROGRESS NOTES
"Subjective   History of Present Illness: Gurinder Amezcua is a 75 y.o. male is here today for follow-up on surgery from 1 year ago. Today patient reports 100% resolution of his neck pain since his surgery.  Fortunately his feeding tube was removed and he has now swallowing and eating on his own.  No other new issues      Previous treatment:   NSAID'S, Post-op PT, Narcotic's, Ice, Heat, and Rest    Previous neurosurgery:  Cervical 3-5 anterior cervical discectomy and fusion 6/6/24    Previous injections: Epidural Block-9/12/23,Facet-2 in 2023    The following portions of the patient's history were reviewed and updated as appropriate: allergies, current medications, past family history, past medical history, past social history, past surgical history, and problem list.    Review of Systems   Constitutional: Negative.    HENT: Negative.     Eyes: Negative.    Respiratory: Negative.     Cardiovascular: Negative.    Gastrointestinal: Negative.    Endocrine: Negative.    Genitourinary: Negative.    Musculoskeletal: Negative.    Skin: Negative.    Allergic/Immunologic: Negative.    Neurological: Negative.    Hematological: Negative.    Psychiatric/Behavioral: Negative.         Objective      /86 (BP Location: Left arm, Patient Position: Sitting)   Pulse 82   Ht 195.6 cm (77\")   Wt 96.2 kg (212 lb)   SpO2 96%   BMI 25.14 kg/m²    Body mass index is 25.14 kg/m².  Vitals:    07/07/25 1017   PainSc: 0-No pain         Neurological Exam        Assessment & Plan   Independent Review of Radiographic Studies:      I personally reviewed and interpreted the images from the following studies.    No new imaging    Medical Decision Making:      Gurinder Amezcua is a 75 y.o. male status post C3-5 ACDF who had severe dysphagia following surgery requiring a feeding tube.  Fortunately now patient is swallowing and the feeding tube has been removed.  Patient has no limitations from my perspective in terms of the ACDF.  I will see " him back as needed in the future.  He can continue working with the speech therapy team in terms of swallowing      Diagnoses and all orders for this visit:    1. S/P spinal fusion (Primary)      No follow-ups on file.    This patient was examined wearing appropriate personal protective equipment.                      Dr. Bang Mirza IV    07/07/25  10:41 EDT   HLD (hyperlipidemia)

## 2025-07-07 ENCOUNTER — OFFICE VISIT (OUTPATIENT)
Dept: NEUROSURGERY | Facility: CLINIC | Age: 75
End: 2025-07-07
Payer: MEDICARE

## 2025-07-07 VITALS
SYSTOLIC BLOOD PRESSURE: 130 MMHG | BODY MASS INDEX: 25.03 KG/M2 | OXYGEN SATURATION: 96 % | HEIGHT: 77 IN | WEIGHT: 212 LBS | DIASTOLIC BLOOD PRESSURE: 86 MMHG | HEART RATE: 82 BPM

## 2025-07-07 DIAGNOSIS — Z98.1 S/P SPINAL FUSION: Primary | ICD-10-CM

## 2025-07-07 PROCEDURE — 1159F MED LIST DOCD IN RCRD: CPT | Performed by: NEUROLOGICAL SURGERY

## 2025-07-07 PROCEDURE — 1160F RVW MEDS BY RX/DR IN RCRD: CPT | Performed by: NEUROLOGICAL SURGERY

## 2025-07-07 PROCEDURE — 99212 OFFICE O/P EST SF 10 MIN: CPT | Performed by: NEUROLOGICAL SURGERY

## (undated) DEVICE — ELECTRD BLD EZ CLN MOD 4IN

## (undated) DEVICE — SOL IRRIG NACL 1000ML

## (undated) DEVICE — KT PEG ENDOVIVE ENFIT SFTY PULL 20F 1P/U

## (undated) DEVICE — GLV SURG BIOGEL LTX PF 8

## (undated) DEVICE — SLV SCD CALF HEMOFORCE DVT THERP REPROC MD

## (undated) DEVICE — DRP C/ARMOR

## (undated) DEVICE — CATH DIAG IMPULSE FR4 6F 100CM

## (undated) DEVICE — DISTRACT SCRW 12MM STRL

## (undated) DEVICE — UNDERGLV SURG BIOGEL/PI PF SYNTH SURG SZ8.5 BLU 50/BX

## (undated) DEVICE — DECANTER: Brand: UNBRANDED

## (undated) DEVICE — PINNACLE INTRODUCER SHEATH: Brand: PINNACLE

## (undated) DEVICE — PK BASIC SPINE 50

## (undated) DEVICE — CATH DIAG IMPULSE FL4 6F 100CM

## (undated) DEVICE — SMOKE EVACUATION TUBING WITH 7/8 IN TO 1/4 IN REDUCER: Brand: BUFFALO FILTER

## (undated) DEVICE — ELECTRD DEFIB M/FUNC PROPADZ RADIOL 2PK

## (undated) DEVICE — ANTIBACTERIAL UNDYED BRAIDED (POLYGLACTIN 910), SYNTHETIC ABSORBABLE SUTURE: Brand: COATED VICRYL

## (undated) DEVICE — KT SURG TURNOVER 050

## (undated) DEVICE — SOLUTION,WATER,IRRIGATION,1000ML,STERILE: Brand: MEDLINE

## (undated) DEVICE — BITEBLOCK ENDO W/STRAP 60F A/ LF DISP

## (undated) DEVICE — CATH DIAG IMPULSE PIG .056 6F 110CM

## (undated) DEVICE — PK TRY HEART CATH 50

## (undated) DEVICE — TUBING, SUCTION, 1/4" X 12', STRAIGHT: Brand: MEDLINE

## (undated) DEVICE — DRSNG WND BORDR/ADHS NONADHR/GZ LF 4X4IN STRL

## (undated) DEVICE — DRP OPMI 326071

## (undated) DEVICE — 3.0MM PRECISION NEURO (MATCH HEAD)

## (undated) DEVICE — SHEET, DRAPE, SPLIT, STERILE: Brand: MEDLINE

## (undated) DEVICE — SPONGE,NEURO,1"X1",XR,STRL,LF,10/PK: Brand: MEDLINE

## (undated) DEVICE — HEAD SUPPORT 1358225 BIOFLEK SURGICAL: Brand: BIO-FLEK

## (undated) DEVICE — PK ENDO GI 50

## (undated) DEVICE — DRAPE SHEET ULTRAGARD: Brand: MEDLINE

## (undated) DEVICE — ADHS SKIN PREMIERPRO EXOFIN TOPICAL HI/VISC .5ML

## (undated) DEVICE — GW PTFE EMERALD HEPCOAT FC J TIP STD .035 3MM 150CM

## (undated) DEVICE — DRAPE,INSTRUMENT,MAGNETIC,10X16: Brand: MEDLINE